# Patient Record
Sex: MALE | Race: BLACK OR AFRICAN AMERICAN | Employment: UNEMPLOYED | URBAN - METROPOLITAN AREA
[De-identification: names, ages, dates, MRNs, and addresses within clinical notes are randomized per-mention and may not be internally consistent; named-entity substitution may affect disease eponyms.]

---

## 2018-03-28 ENCOUNTER — HOSPITAL ENCOUNTER (INPATIENT)
Age: 33
LOS: 4 days | Discharge: HOME | DRG: 751 | End: 2018-04-02
Attending: EMERGENCY MEDICINE | Admitting: PSYCHIATRY & NEUROLOGY
Payer: COMMERCIAL

## 2018-03-28 DIAGNOSIS — R45.851 SUICIDAL IDEATIONS: Primary | ICD-10-CM

## 2018-03-28 DIAGNOSIS — G62.9 NEUROPATHY: ICD-10-CM

## 2018-03-28 LAB
ALBUMIN SERPL-MCNC: 4.6 G/DL (ref 3.4–5)
ALBUMIN/GLOB SERPL: 1.1 {RATIO} (ref 0.8–1.7)
ALP SERPL-CCNC: 95 U/L (ref 45–117)
ALT SERPL-CCNC: 140 U/L (ref 16–61)
AMPHET UR QL SCN: NEGATIVE
ANION GAP SERPL CALC-SCNC: 24 MMOL/L (ref 3–18)
APPEARANCE UR: CLEAR
AST SERPL-CCNC: 107 U/L (ref 15–37)
BACTERIA URNS QL MICRO: NEGATIVE /HPF
BARBITURATES UR QL SCN: NEGATIVE
BASOPHILS # BLD: 0 K/UL (ref 0–0.1)
BASOPHILS NFR BLD: 0 % (ref 0–2)
BENZODIAZ UR QL: NEGATIVE
BILIRUB SERPL-MCNC: 0.8 MG/DL (ref 0.2–1)
BILIRUB UR QL: NEGATIVE
BUN SERPL-MCNC: 12 MG/DL (ref 7–18)
BUN/CREAT SERPL: 13 (ref 12–20)
CALCIUM SERPL-MCNC: 9.8 MG/DL (ref 8.5–10.1)
CANNABINOIDS UR QL SCN: NEGATIVE
CHLORIDE SERPL-SCNC: 91 MMOL/L (ref 100–108)
CO2 SERPL-SCNC: 16 MMOL/L (ref 21–32)
COCAINE UR QL SCN: NEGATIVE
COLOR UR: YELLOW
CREAT SERPL-MCNC: 0.92 MG/DL (ref 0.6–1.3)
DIFFERENTIAL METHOD BLD: ABNORMAL
EOSINOPHIL # BLD: 0 K/UL (ref 0–0.4)
EOSINOPHIL NFR BLD: 0 % (ref 0–5)
EPITH CASTS URNS QL MICRO: NORMAL /LPF (ref 0–5)
ERYTHROCYTE [DISTWIDTH] IN BLOOD BY AUTOMATED COUNT: 13.7 % (ref 11.6–14.5)
ETHANOL SERPL-MCNC: 218 MG/DL (ref 0–3)
GLOBULIN SER CALC-MCNC: 4.1 G/DL (ref 2–4)
GLUCOSE SERPL-MCNC: 309 MG/DL (ref 74–99)
GLUCOSE UR STRIP.AUTO-MCNC: >1000 MG/DL
HCT VFR BLD AUTO: 44.2 % (ref 36–48)
HDSCOM,HDSCOM: NORMAL
HGB BLD-MCNC: 15.9 G/DL (ref 13–16)
HGB UR QL STRIP: NEGATIVE
KETONES UR QL STRIP.AUTO: 80 MG/DL
LEUKOCYTE ESTERASE UR QL STRIP.AUTO: NEGATIVE
LYMPHOCYTES # BLD: 1.5 K/UL (ref 0.9–3.6)
LYMPHOCYTES NFR BLD: 23 % (ref 21–52)
MCH RBC QN AUTO: 32.6 PG (ref 24–34)
MCHC RBC AUTO-ENTMCNC: 36 G/DL (ref 31–37)
MCV RBC AUTO: 90.8 FL (ref 74–97)
METHADONE UR QL: NEGATIVE
MONOCYTES # BLD: 1 K/UL (ref 0.05–1.2)
MONOCYTES NFR BLD: 15 % (ref 3–10)
NEUTS SEG # BLD: 4.2 K/UL (ref 1.8–8)
NEUTS SEG NFR BLD: 62 % (ref 40–73)
NITRITE UR QL STRIP.AUTO: NEGATIVE
OPIATES UR QL: NEGATIVE
PCP UR QL: NEGATIVE
PH UR STRIP: 5 [PH] (ref 5–8)
PLATELET # BLD AUTO: 304 K/UL (ref 135–420)
PMV BLD AUTO: 10.7 FL (ref 9.2–11.8)
POTASSIUM SERPL-SCNC: 3.5 MMOL/L (ref 3.5–5.5)
PROT SERPL-MCNC: 8.7 G/DL (ref 6.4–8.2)
PROT UR STRIP-MCNC: 30 MG/DL
RBC # BLD AUTO: 4.87 M/UL (ref 4.7–5.5)
RBC #/AREA URNS HPF: NEGATIVE /HPF (ref 0–5)
SODIUM SERPL-SCNC: 131 MMOL/L (ref 136–145)
SP GR UR REFRACTOMETRY: >1.03 (ref 1–1.03)
UROBILINOGEN UR QL STRIP.AUTO: 0.2 EU/DL (ref 0.2–1)
WBC # BLD AUTO: 6.7 K/UL (ref 4.6–13.2)
WBC URNS QL MICRO: NEGATIVE /HPF (ref 0–4)

## 2018-03-28 PROCEDURE — 80307 DRUG TEST PRSMV CHEM ANLYZR: CPT | Performed by: NURSE PRACTITIONER

## 2018-03-28 PROCEDURE — 85025 COMPLETE CBC W/AUTO DIFF WBC: CPT | Performed by: NURSE PRACTITIONER

## 2018-03-28 PROCEDURE — 80053 COMPREHEN METABOLIC PANEL: CPT | Performed by: NURSE PRACTITIONER

## 2018-03-28 PROCEDURE — 81001 URINALYSIS AUTO W/SCOPE: CPT | Performed by: NURSE PRACTITIONER

## 2018-03-28 PROCEDURE — 99285 EMERGENCY DEPT VISIT HI MDM: CPT

## 2018-03-28 NOTE — ED PROVIDER NOTES
HPI Comments: Pt presents to ed with suicidal thoughts and a plan that he states if he is not treated he will come back dead. No attempt thus far    Patient is a 29 y.o. male presenting with suicidal ideation. The history is provided by the patient. No  was used. Suicidal   This is a new problem. The current episode started 1 to 2 hours ago. The problem occurs constantly. The problem has not changed since onset. Pertinent negatives include no headaches. Nothing aggravates the symptoms. Nothing relieves the symptoms. He has tried nothing for the symptoms. Past Medical History:   Diagnosis Date    Asthma     vs Reactive Airway Disease with normal PFT 7/7/14    Diabetes ([facility] [address] 2012    HTN (hypertension)     Hyperlipidemia LDL goal < 70 12/2014    Leg pain, bilateral 2012    ? peripheral neuropathy    Vitamin D deficiency 12/2014       History reviewed. No pertinent surgical history.       Family History:   Problem Relation Age of Onset    Hypertension Mother     Diabetes Mother     Stroke Paternal Uncle     Diabetes Paternal Aunt     Diabetes Maternal Aunt     Diabetes Other      cousin, Type I dm       Social History     Social History    Marital status: SINGLE     Spouse name: N/A    Number of children: N/A    Years of education: N/A     Occupational History    ,       per [employer], [place]     Social History Main Topics    Smoking status: Former Smoker     Types: Cigarettes    Smokeless tobacco: Never Used    Alcohol use Yes      Comment: 3 x week, liquor tequila - free pour at home 2-3 drinks at home, one pint per week    Drug use: No    Sexual activity: Yes     Partners: Female     Birth control/ protection: Condom     Other Topics Concern     Service No    Blood Transfusions No    Caffeine Concern No    Occupational Exposure No    Hobby Hazards No    Sleep Concern No    Stress Concern Yes     fatigue    Special Diet No    Back Care No    Exercise No    Bike Helmet No    Seat Belt No    Self-Exams No     Social History Narrative         ALLERGIES: Review of patient's allergies indicates no known allergies. Review of Systems   Constitutional: Negative for fever. Neurological: Negative for headaches. Psychiatric/Behavioral: Positive for suicidal ideas. Negative for self-injury. All other systems reviewed and are negative. Vitals:    03/28/18 1853   BP: (!) 187/94   Pulse: (!) 120   Resp: 18   Temp: 100 °F (37.8 °C)   SpO2: 95%   Weight: 131.5 kg (290 lb)   Height: 6' 2\" (1.88 m)            Physical Exam   Constitutional: He is oriented to person, place, and time. He appears well-developed and well-nourished. HENT:   Head: Normocephalic and atraumatic. Eyes: Conjunctivae and EOM are normal. Pupils are equal, round, and reactive to light. Neck: Normal range of motion. Neck supple. Cardiovascular: Normal rate and regular rhythm. Pulmonary/Chest: Effort normal and breath sounds normal.   Abdominal: Soft. Bowel sounds are normal.   Musculoskeletal: Normal range of motion. Neurological: He is alert and oriented to person, place, and time. He has normal reflexes. Skin: Skin is warm and dry. Psychiatric: Judgment normal. He is withdrawn. He exhibits a depressed mood. He expresses suicidal ideation. Nursing note and vitals reviewed. MDM  Number of Diagnoses or Management Options  Suicidal ideations: established and worsening  Diagnosis management comments: Pt cleared for  evaluation. Liza Contreras and Selwyn White informed.       Pt cleared for  evalaution    Per Selwyn White with  pt will be admitted to [facility] unit, he asked for haldol and ativan to be given to pt before admission       Amount and/or Complexity of Data Reviewed  Clinical lab tests: ordered  Review and summarize past medical records: yes  Independent visualization of images, tracings, or specimens: yes    Risk of Complications, Morbidity, and/or Mortality  Presenting problems: moderate  Diagnostic procedures: moderate  Management options: moderate    Patient Progress  Patient progress: stable        ED Course       Procedures            Vitals:  Patient Vitals for the past 12 hrs:   Temp Pulse Resp BP SpO2   03/28/18 1853 100 °F (37.8 °C) (!) 120 18 (!) 187/94 95 %        Lab findings:  Recent Results (from the past 12 hour(s))   URINALYSIS W/ RFLX MICROSCOPIC    Collection Time: 03/28/18  7:30 PM   Result Value Ref Range    Color YELLOW      Appearance CLEAR      Specific gravity >1.030 (H) 1.005 - 1.030    pH (UA) 5.0 5.0 - 8.0      Protein 30 (A) NEG mg/dL    Glucose >1000 (A) NEG mg/dL    Ketone 80 (A) NEG mg/dL    Bilirubin NEGATIVE  NEG      Blood NEGATIVE  NEG      Urobilinogen 0.2 0.2 - 1.0 EU/dL    Nitrites NEGATIVE  NEG      Leukocyte Esterase NEGATIVE  NEG     DRUG SCREEN, URINE    Collection Time: 03/28/18  7:30 PM   Result Value Ref Range    BENZODIAZEPINES NEGATIVE  NEG      BARBITURATES NEGATIVE  NEG      THC (TH-CANNABINOL) NEGATIVE  NEG      OPIATES NEGATIVE  NEG      PCP(PHENCYCLIDINE) NEGATIVE  NEG      COCAINE NEGATIVE  NEG      AMPHETAMINES NEGATIVE  NEG      METHADONE NEGATIVE  NEG      HDSCOM (NOTE)    URINE MICROSCOPIC ONLY    Collection Time: 03/28/18  7:30 PM   Result Value Ref Range    WBC NEGATIVE  0 - 4 /hpf    RBC NEGATIVE  0 - 5 /hpf    Epithelial cells 1+ 0 - 5 /lpf    Bacteria NEGATIVE  NEG /hpf   CBC WITH AUTOMATED DIFF    Collection Time: 03/28/18  9:38 PM   Result Value Ref Range    WBC 6.7 4.6 - 13.2 K/uL    RBC 4.87 4.70 - 5.50 M/uL    HGB 15.9 13.0 - 16.0 g/dL    HCT 44.2 36.0 - 48.0 %    MCV 90.8 74.0 - 97.0 FL    MCH 32.6 24.0 - 34.0 PG    MCHC 36.0 31.0 - 37.0 g/dL    RDW 13.7 11.6 - 14.5 %    PLATELET 384 317 - 010 K/uL    MPV 10.7 9.2 - 11.8 FL    NEUTROPHILS 62 40 - 73 %    LYMPHOCYTES 23 21 - 52 %    MONOCYTES 15 (H) 3 - 10 %    EOSINOPHILS 0 0 - 5 %    BASOPHILS 0 0 - 2 %    ABS. NEUTROPHILS 4.2 1.8 - 8.0 K/UL    ABS.  LYMPHOCYTES 1.5 0.9 - 3.6 K/UL    ABS. MONOCYTES 1.0 0.05 - 1.2 K/UL    ABS. EOSINOPHILS 0.0 0.0 - 0.4 K/UL    ABS. BASOPHILS 0.0 0.0 - 0.1 K/UL    DF AUTOMATED     METABOLIC PANEL, COMPREHENSIVE    Collection Time: 03/28/18  9:38 PM   Result Value Ref Range    Sodium 131 (L) 136 - 145 mmol/L    Potassium 3.5 3.5 - 5.5 mmol/L    Chloride 91 (L) 100 - 108 mmol/L    CO2 16 (L) 21 - 32 mmol/L    Anion gap 24 (H) 3.0 - 18 mmol/L    Glucose 309 (H) 74 - 99 mg/dL    BUN 12 7.0 - 18 MG/DL    Creatinine 0.92 0.6 - 1.3 MG/DL    BUN/Creatinine ratio 13 12 - 20      GFR est AA >60 >60 ml/min/1.73m2    GFR est non-AA >60 >60 ml/min/1.73m2    Calcium 9.8 8.5 - 10.1 MG/DL    Bilirubin, total 0.8 0.2 - 1.0 MG/DL    ALT (SGPT) 140 (H) 16 - 61 U/L    AST (SGOT) 107 (H) 15 - 37 U/L    Alk. phosphatase 95 45 - 117 U/L    Protein, total 8.7 (H) 6.4 - 8.2 g/dL    Albumin 4.6 3.4 - 5.0 g/dL    Globulin 4.1 (H) 2.0 - 4.0 g/dL    A-G Ratio 1.1 0.8 - 1.7     ETHYL ALCOHOL    Collection Time: 03/28/18  9:38 PM   Result Value Ref Range    ALCOHOL(ETHYL),SERUM 218 (H) 0 - 3 MG/DL   GLUCOSE, POC    Collection Time: 03/29/18  1:11 AM   Result Value Ref Range    Glucose (POC) 327 (H) 70 - 110 mg/dL         Disposition:    Diagnosis:   1. Suicidal ideations        Disposition: admitted to inpatient  unit        Patient's Medications   Start Taking    No medications on file   Continue Taking    ALBUTEROL (PROVENTIL HFA) 90 MCG/ACTUATION INHALER    Take 2 Puffs by inhalation every four (4) hours as needed for Wheezing. CHOLECALCIFEROL, VITAMIN D3, (VITAMIN D3) 2,000 UNIT CAP CAPSULE    Take 2,000 Units by mouth daily. FLUTICASONE-SALMETEROL (ADVAIR) 100-50 MCG/DOSE DISKUS INHALER    Take 1 Puff by inhalation every twelve (12) hours. INSULIN ASPART (NOVOLOG) 100 UNIT/ML FLEXPEN    Sliding scale insulin    INSULIN DETEMIR (LEVEMIR FLEXTOUCH) 100 UNIT/ML (3 ML) PEN    20 Units by SubCUTAneous route two (2) times a day.  Indications: TYPE 1 DIABETES MELLITUS    MAGNESIUM OXIDE (MAG-OX) 400 MG TABLET    Take 1 tablet by mouth daily. OMEGA-3 ACID ETHYL ESTERS (LOVAZA) 1 GRAM CAPSULE    Take 2 capsules by mouth two (2) times a day. PREGABALIN (LYRICA) 150 MG CAPSULE    Take 1 capsule by mouth two (2) times a day. ROSUVASTATIN (CRESTOR) 10 MG TABLET    Take 1 tablet by mouth nightly.    These Medications have changed    No medications on file   Stop Taking    No medications on file        Karri SZYMANSKIP-C

## 2018-03-28 NOTE — ED TRIAGE NOTES
\"I'm going kill myself. \"  The patient admits to having suicidal thoughts for \"quite some time. \"  States, \"I'm tired of thinking about it. I'm ready to do something about it. \"

## 2018-03-28 NOTE — IP AVS SNAPSHOT
Summary of Care Report The Summary of Care report has been created to help improve care coordination. Users with access to "[employer] or [employer] [facility] [facility] (Web-based application) may access additional patient information including the Discharge Summary. If you are not currently a [facility] user and need more information, please call the number listed below in the [place] [address] section and ask to be connected with Medical Records. Facility Information Name Address Phone [address] [facility] [address] [facility] [address] [phone] Patient Information Patient Name Sex  David Hook ([id number]) Male 1985 Discharge Information Admitting Provider Service Area Unit Mark Renteria MD / [address] [place] [address] 1 Adult Chem [facility] / [phone] Discharge Provider Discharge Date/Time Discharge Disposition Destination (none) 2018 Afternoon (Pending) AHR (none) Patient Language Language ENGLISH [13] Hospital Problems as of 2018  Reviewed: 2015  9:34 AM by Martin Garsia NP Class Noted - Resolved Last Modified POA Active Problems * (Principal)MDD (major depressive disorder), recurrent severe, without psychosis ([address]  3/29/2018 - Present 3/29/2018 by Mark Renteria MD Yes Entered by Mark Renteria MD  
  Alcohol use disorder, moderate, dependence ([address]  3/29/2018 - Present 3/29/2018 by Mark Renteria MD Yes Entered by Mark Renteria MD  
  Alcohol withdrawal, uncomplicated ([address]   - Present 3/29/2018 by Mark Renteria MD Unknown Entered by Mark Renteria MD  
  
Non-Hospital Problems as of 2018  Reviewed: 2015  9:34 AM by Martin Garsia NP Class Noted - Resolved Last Modified Active Problems Asthma  4/10/2013 - Present 4/10/2013 by Tavia Lao Entered by Tavia Lao HTN (hypertension)  6/9/2014 - Present 6/9/2014 by Marysol Corey, NP Entered by Marysol Corey, PA Flexor tendon laceration of finger with open wound  1/6/2015 - Present 1/6/2015 by Marysol Corey, NP Entered by Marysol Corey, PA Hypertriglyceridemia  1/6/2015 - Present 1/6/2015 by Marysol Corey, NP Entered by Marysol Corey, NP Diabetes mellitus, insulin dependent (IDDM), controlled ([facility] [address]  1/6/2015 - Present 1/6/2015 by Marysol Corey, NP Entered by Marysol Corey, PA Hyperlipidemia LDL goal < 70  1/6/2015 - Present 1/6/2015 by Marysol Corey, NP Entered by Marysol Corey, PA Hypomagnesemia  2/7/2015 - Present 2/7/2015 by Marysol Corey, NP Entered by Marysol Corey, PA Vitamin D deficiency  2/7/2015 - Present 2/7/2015 by Marysol Corey, NP Entered by Mraysol Corey, PA You are allergic to the following No active allergies Current Discharge Medication List  
  
START taking these medications Dose & Instructions Dispensing Information Comments  
 amino acids/multivit with iron Cap capsule Commonly known as:  Becca Westmoreland Dose:  2 Cap Take 2 Caps by mouth three (3) times daily (with meals) for 17 days. Indications: Vitamin deficiency Quantity:  51 Cap Refills:  0  
   
 ARIPiprazole 5 mg tablet Commonly known as:  ABILIFY Start taking on:  4/3/2018 Dose:  7.5 mg Take 1.5 Tabs by mouth daily. Indications: DEPRESSION TREATMENT ADJUNCT Quantity:  45 Tab Refills:  0  
   
 citalopram 20 mg tablet Commonly known as:  Eduardo Haw Dose:  20 mg Take 1 Tab by mouth every evening. Indications: major depressive disorder Quantity:  30 Tab Refills:  0  
   
 insulin glargine 100 unit/mL injection Commonly known as:  LANTUS Dose:  40 Units 40 Units by SubCUTAneous route two (2) times a day. Indications: DM  Quantity:  1 Vial  
 Refills:  0  
   
 metFORMIN  mg tablet Commonly known as:  GLUCOPHAGE XR Dose:  750 mg Take 1 Tab by mouth Before breakfast and dinner. Indications: type 2 diabetes mellitus Quantity:  60 Tab Refills:  0  
   
 methocarbamol 750 mg tablet Commonly known as:  ROBAXIN Dose:  750 mg Take 1 Tab by mouth four (4) times daily as needed. Indications: Muscle Spasm Quantity:  30 Tab Refills:  0  
   
 neomycin-bacitracin-polymyxin 3.5mg-400 unit- 5,000 unit/gram ointment Commonly known as:  NEOSPORIN Apply  to affected area two (2) times a day. Indications: MINOR BACTERIAL SKIN INFECTIONS Quantity:  1 Tube Refills:  0  
   
 traZODone 100 mg tablet Commonly known as:  Eward Sheets Dose:  100 mg Take 1 Tab by mouth nightly as needed for Sleep. Indications: insomnia associated with depression Quantity:  15 Tab Refills:  0 CONTINUE these medications which have NOT CHANGED Dose & Instructions Dispensing Information Comments  
 albuterol 90 mcg/actuation inhaler Commonly known as:  PROVENTIL HFA Dose:  2 Puff Take 2 Puffs by inhalation every four (4) hours as needed for Wheezing. Quantity:  3 Inhaler Refills:  3 Cholecalciferol (Vitamin D3) 2,000 unit Cap capsule Commonly known as:  VITAMIN D3 Dose:  2000 Units Take 2,000 Units by mouth daily. Quantity:  30 capsule Refills:  11  
   
 fluticasone-salmeterol 100-50 mcg/dose diskus inhaler Commonly known as:  ADVAIR Dose:  1 Puff Take 1 Puff by inhalation every twelve (12) hours. Quantity:  4 Inhaler Refills:  3  
   
 magnesium oxide 400 mg tablet Commonly known as:  MAG-OX Dose:  400 mg Take 1 tablet by mouth daily. Quantity:  30 tablet Refills:  11  
   
 omega-3 acid ethyl esters 1 gram capsule Commonly known as:  Epimenio Devon Dose:  2 g Take 2 capsules by mouth two (2) times a day. Quantity:  360 capsule Refills:  3  
   
 pregabalin 150 mg capsule Commonly known as:  Carie Jimmy Dose:  150 mg Take 1 Cap by mouth two (2) times a day. Quantity:  180 Cap Refills:  3  
   
 rosuvastatin 10 mg tablet Commonly known as:  CRESTOR Dose:  10 mg Take 1 tablet by mouth nightly. Quantity:  90 tablet Refills:  3 STOP taking these medications Comments  
 insulin aspart U-100 100 unit/mL Inpn Commonly known as:  NOVOLOG  
   
   
 insulin detemir U-100 100 unit/mL (3 mL) Inpn Commonly known as:  Vinod Moreno Follow-up Information Follow up With Details Comments Contact [facility]  Intake associates ate both out today. ...will call patient tomorrow with [address] [facility] [address] [place] [address] [phone] Discharge Instructions BEHAVIORAL HEALTH NURSING DISCHARGE NOTE Emergency Numbers [address] [facility] Desk: [phone] [facility]: [phone] Suicide Prevention Line: [phone] (TALK) The following personal items collected during your admission are returned to you:  
Dental Appliance:   
Vision: Visual Aid: None Hearing Aid:   
Jewelry:   
Clothing: Clothing: (P) Belt, Pants, Shirt, Socks, Undergarments Other Valuables: Other Valuables: (P) Cigarettes, Wallet (shoes and two lighters) Valuables sent to safe: The discharge information has been reviewed with the patient. The patient verbalized understanding. [employer] Activation Thank you for requesting access to [employer]. Please follow the instructions below to securely access and download your online medical record. [employer] allows you to send messages to your doctor, view your test results, renew your prescriptions, schedule appointments, and more. How Do I Sign Up? In your internet browser, go to [email or web address] Click on the First Time User? Click Here link in the Sign In box. You will be redirect to the New Member Sign Up page. Enter your [employer] Access Code exactly as it appears below. You will not need to use this code after youve completed the sign-up process. If you do not sign up before the expiration date, you must request a new code. WhatsApp Access Code: [email or web address] Expires: 2018  9:39 PM (This is the date your WhatsApp access code will ) Enter the last four digits of your Social Security Number (xxxx) and Date of Birth (mm/dd/yyyy) as indicated and click Submit. You will be taken to the next sign-up page. Create a [employer] ID. This will be your WhatsApp login ID and cannot be changed, so think of one that is secure and easy to remember. Create a WhatsApp password. You can change your password at any time. Enter your Password Reset Question and Answer. This can be used at a later time if you forget your password. Enter your e-mail address. You will receive e-mail notification when new information is available in [address] Click Sign Up. You can now view and download portions of your medical record. Click the [place] link to download a portable copy of your medical information. Additional Information If you have questions, please visit the Frequently Asked Questions section of the [employer] website at [email or web address] Remember, WhatsApp is NOT to be used for urgent needs. For medical emergencies, dial 911. Patient armband removed and shredded Chart Review Routing History No Routing History on File

## 2018-03-28 NOTE — IP AVS SNAPSHOT
[facility] 
 
 
 [address] [facility] Barrie Mota Patient: Ruba Ortega MRN: [id number] :1985 About your hospitalization You were admitted on:  2018 You last received care in the:  [facility] ADULT CHEM DEP You were discharged on:  2018 Why you were hospitalized Your primary diagnosis was:  Mdd (Major Depressive Disorder), Recurrent Severe, Without Psychosis (Hcc) Your diagnoses also included:  Alcohol Use Disorder, Moderate, Dependence (Hcc), Alcohol Withdrawal, Uncomplicated (Hcc) Follow-up Information Follow up With Details Comments Contact [facility]  Intake associates ate both out today. ...will call patient tomorrow with [employer] [address] [place] [address] [phone] Discharge Orders None A check xavier indicates which time of day the medication should be taken. My Medications START taking these medications Instructions Each Dose to Equal  
 Morning Noon Evening Bedtime  
 amino acids/multivit with iron Cap capsule Commonly known as:  Kalin Martin Your last dose was: Your next dose is: Take 2 Caps by mouth three (3) times daily (with meals) for 17 days. Indications: Vitamin deficiency 2 Cap ARIPiprazole 5 mg tablet Commonly known as:  ABILIFY Start taking on:  4/3/2018 Your last dose was: Your next dose is: Take 1.5 Tabs by mouth daily. Indications: DEPRESSION TREATMENT ADJUNCT  
 7.5 mg  
    
   
   
   
  
 citalopram 20 mg tablet Commonly known as:  Jamal Jones Your last dose was: Your next dose is: Take 1 Tab by mouth every evening. Indications: major depressive disorder 20 mg  
    
   
   
   
  
 insulin glargine 100 unit/mL injection Commonly known as:  LANTUS Your last dose was: Your next dose is:    
   
   
 40 Units by SubCUTAneous route two (2) times a day. Indications: DM  
 40 Units  
    
   
   
   
  
 metFORMIN  mg tablet Commonly known as:  GLUCOPHAGE XR Your last dose was: Your next dose is: Take 1 Tab by mouth Before breakfast and dinner. Indications: type 2 diabetes mellitus 750 mg  
    
   
   
   
  
 methocarbamol 750 mg tablet Commonly known as:  ROBAXIN Your last dose was: Your next dose is: Take 1 Tab by mouth four (4) times daily as needed. Indications: Muscle Spasm  
 750 mg  
    
   
   
   
  
 neomycin-bacitracin-polymyxin 3.5mg-400 unit- 5,000 unit/gram ointment Commonly known as:  NEOSPORIN Your last dose was: Your next dose is:    
   
   
 Apply  to affected area two (2) times a day. Indications: MINOR BACTERIAL SKIN INFECTIONS  
     
   
   
   
  
 traZODone 100 mg tablet Commonly known as:  Darvin Freeman Your last dose was: Your next dose is: Take 1 Tab by mouth nightly as needed for Sleep. Indications: insomnia associated with depression 100 mg CONTINUE taking these medications Instructions Each Dose to Equal  
 Morning Noon Evening Bedtime  
 albuterol 90 mcg/actuation inhaler Commonly known as:  PROVENTIL HFA Your last dose was: Your next dose is: Take 2 Puffs by inhalation every four (4) hours as needed for Wheezing. 2 Puff Cholecalciferol (Vitamin D3) 2,000 unit Cap capsule Commonly known as:  VITAMIN D3 Your last dose was: Your next dose is: Take 2,000 Units by mouth daily. 2000 Units  
    
   
   
   
  
 fluticasone-salmeterol 100-50 mcg/dose diskus inhaler Commonly known as:  ADVAIR Your last dose was: Your next dose is: Take 1 Puff by inhalation every twelve (12) hours. 1 Puff magnesium oxide 400 mg tablet Commonly known as:  MAG-OX Your last dose was: Your next dose is: Take 1 tablet by mouth daily. 400 mg  
    
   
   
   
  
 omega-3 acid ethyl esters 1 gram capsule Commonly known as:  Poncho Sanchez Your last dose was: Your next dose is: Take 2 capsules by mouth two (2) times a day. 2 g  
    
   
   
   
  
 pregabalin 150 mg capsule Commonly known as:  Mariaelena Quigley Your last dose was: Your next dose is: Take 1 Cap by mouth two (2) times a day. 150 mg  
    
   
   
   
  
 rosuvastatin 10 mg tablet Commonly known as:  CRESTOR Your last dose was: Your next dose is: Take 1 tablet by mouth nightly. 10 mg  
    
   
   
   
  
  
STOP taking these medications   
 insulin aspart U-100 100 unit/mL Inpn Commonly known as:  NOVOLOG  
   
  
 insulin detemir U-100 100 unit/mL (3 mL) Inpn Commonly known as:  Ashleigh Daugherty Where to Get Your Medications Information on where to get these meds will be given to you by the nurse or doctor. ! Ask your nurse or doctor about these medications  
  amino acids/multivit with iron Cap capsule ARIPiprazole 5 mg tablet  
 citalopram 20 mg tablet  
 insulin glargine 100 unit/mL injection  
 metFORMIN  mg tablet  
 methocarbamol 750 mg tablet  
 neomycin-bacitracin-polymyxin 3.5mg-400 unit- 5,000 unit/gram ointment  
 traZODone 100 mg tablet Discharge Instructions BEHAVIORAL HEALTH NURSING DISCHARGE NOTE Emergency Numbers [facility] Desk: [phone] [facility]: [phone] Suicide Prevention Line: [phone] (TALK) The following personal items collected during your admission are returned to you:  
Dental Appliance:   
Vision: Visual Aid: None Hearing Aid:   
Jewelry:   
Clothing: Clothing: (P) Belt, Pants, Shirt, Socks, Undergarments Other Valuables: Other Valuables: (P) Cigarettes, Wallet (shoes and two lighters) Valuables sent to safe: The discharge information has been reviewed with the patient. The patient verbalized understanding. MyChart Activation Thank you for requesting access to [email or web address]. Please follow the instructions below to securely access and download your online medical record. [email or web address] allows you to send messages to your doctor, view your test results, renew your prescriptions, schedule appointments, and more. How Do I Sign Up? In your internet browser, go to [email or web address] Click on the First Time User? Click Here link in the Sign In box. You will be redirect to the New Member Sign Up page. Enter your [email or web address] Access Code exactly as it appears below. You will not need to use this code after youve completed the sign-up process. If you do not sign up before the expiration date, you must request a new code. [email or web address] Access Code: [email or web address] Expires: 2018  9:39 PM (This is the date your [email or web address] access code will ) Enter the last four digits of your Social Security Number (xxxx) and Date of Birth (mm/dd/yyyy) as indicated and click Submit. You will be taken to the next sign-up page. Create a [email or web address] ID. This will be your [email or web address] login ID and cannot be changed, so think of one that is secure and easy to remember. Create a [email or web address] password. You can change your password at any time. Enter your Password Reset Question and Answer. This can be used at a later time if you forget your password. Enter your e-mail address. You will receive e-mail notification when new information is available in [address] Click Sign Up. You can now view and download portions of your medical record. Click the [employer] link to download a portable copy of your medical information. Additional Information If you have questions, please visit the Frequently Asked Questions section of the [facility] website at [email or web address] and some sugar. Need Fixed/amaysimt/. Remember, MyChart is NOT to be used for urgent needs. For medical emergencies, dial 911. Patient armband removed and shredded Introducing \[facility] [place] [facility]\"" & HEALTH SERVICES! [facility] introduces [facility] patient portal. Now you can access parts of your medical record, email your doctor's office, and request medication refills online. 1. In your internet browser, go to [email or web address] and some sugar. Need Fixed/amaysimt 2. Click on the First Time User? Click Here link in the Sign In box. You will see the New Member Sign Up page. 3. Enter your [facility] Access Code exactly as it appears below. You will not need to use this code after youve completed the sign-up process. If you do not sign up before the expiration date, you must request a new code. · [facility] Access Code: [email or web address] Expires: 6/26/2018  9:39 PM 
 
4. Enter the last four digits of your Social Security Number (xxxx) and Date of Birth (mm/dd/yyyy) as indicated and click Submit. You will be taken to the next sign-up page. 5. Create a [facility] ID. This will be your [facility] login ID and cannot be changed, so think of one that is secure and easy to remember. 6. Create a [facility] password. You can change your password at any time. 7. Enter your Password Reset Question and Answer. This can be used at a later time if you forget your password. 8. Enter your e-mail address. You will receive e-mail notification when new information is available in [address] 9. Click Sign Up. You can now view and download portions of your medical record. 10. Click the Download Summary menu link to download a portable copy of your medical information. If you have questions, please visit the Frequently Asked Questions section of the [facility] website. Remember, [facility] is NOT to be used for urgent needs. For medical emergencies, dial 911. Now available from your iPhone and Android! Introducing Ambrocio Senior As a [employer] patient, I wanted to make you aware of our electronic visit tool called [employer] Senior. [employer] 24/7 allows you to connect within minutes with a medical provider 24 hours a day, seven days a week via a mobile device or tablet or logging into a secure website from your computer. You can access Ambrocio Senior from anywhere in the [place]. A virtual visit might be right for you when you have a simple condition and feel like you just dont want to get out of bed, or cant get away from work for an appointment, when your regular [employer] provider is not available (evenings, weekends or holidays), or when youre out of town and need minor care. Electronic visits cost only $49 and if the [employer] 24/7 provider determines a prescription is needed to treat your condition, one can be electronically transmitted to a nearby pharmacy*. Please take a moment to enroll today if you have not already done so. The enrollment process is free and takes just a few minutes. To enroll, please download the [employer] 24/7 peng to your tablet or phone, or visit [email or web address] to enroll on your computer. And, as an [address] [place] [address] patient with a [employer] account, the results of your visits will be scanned into your electronic medical record and your primary care provider will be able to view the scanned results. We urge you to continue to see your regular [employer] provider for your ongoing medical care. And while your primary care provider may not be the one available when you seek a Ambrocio Senior virtual visit, the peace of mind you get from getting a real diagnosis real time can be priceless. For more information on Ambrocio Senior, view our Frequently Asked Questions (FAQs) at [email or web address]. Sincerely, 
 
Daniel Jackson MD 
Chief Medical Officer Rodney Ngo *:  certain medications cannot be prescribed via Ambrocio Senior Providers Seen During Your Hospitalization Provider Specialty Primary office phone Oma Gibson MD Emergency Medicine [phone] Ailyn Ricci DO Emergency Medicine [phone] Carolyn Preciado MD Psychiatry [phone] Your Primary Care Physician (PCP) Primary Care Physician Office Phone Office Fax OTHER, PHYS ** None ** ** None ** You are allergic to the following No active allergies Recent Documentation Height Weight BMI Smoking Status 1.88 m 131.5 kg 37.23 kg/m2 Former Smoker Emergency Contacts Name Discharge Info Relation Home Work [facility] [address] [facility] C[facility] [3] Parent [1] [phone] Patient Belongings The following personal items are in your possession at time of discharge: 
     Visual Aid: None             Clothing: (P) Belt, Pants, Shirt, Socks, Undergarments    Other Valuables: (P) Cigarettes, Wallet (shoes and two lighters) Discharge Instructions Attachments/References DIABETES DIET MEAL PLANNING: GENERAL INFO (ENGLISH) DIABETES DIET GUIDELINES: GENERAL INFO (ENGLISH) DIABETES: COUNTING CARBOHYDRATES WHEN YOU TAKE INSULIN (ENGLISH) DIABETES: NUTRITION TIPS (ENGLISH) Patient Handouts Learning About Meal Planning for Diabetes Why plan your meals? Meal planning can be a key part of managing diabetes. Planning meals and snacks with the right balance of carbohydrate, protein, and fat can help you keep your blood sugar at the target level you set with your doctor. You don't have to eat special foods. You can eat what your family eats, including sweets once in a while. But you do have to pay attention to how often you eat and how much you eat of certain foods. You may want to work with a dietitian or a certified diabetes educator.  He or she can give you tips and meal ideas and can answer your questions about meal planning. This health professional can also help you reach a healthy weight if that is one of your goals. What plan is right for you? Your dietitian or diabetes educator may suggest that you start with the plate format or carbohydrate counting. The plate format The plate format is a simple way to help you manage how you eat. You plan meals by learning how much space each food should take on a plate. Using the plate format helps you spread carbohydrate throughout the day. It can make it easier to keep your blood sugar level within your target range. It also helps you see if you're eating healthy portion sizes. To use the plate format, you put non-starchy vegetables on half your plate. Add meat or meat substitutes on one-quarter of the plate. Put a grain or starchy vegetable (such as brown rice or a potato) on the final quarter of the plate. You can add a small piece of fruit and some low-fat or fat-free milk or yogurt, depending on your carbohydrate goal for each meal. 
Here are some tips for using the plate format: · Make sure that you are not using an oversized plate. A 9-inch plate is best. Many restaurants use larger plates. · Get used to using the plate format at home. Then you can use it when you eat out. · Write down your questions about using the plate format. Talk to your doctor, a dietitian, or a diabetes educator about your concerns. Carbohydrate counting With carbohydrate counting, you plan meals based on the amount of carbohydrate in each food. Carbohydrate raises blood sugar higher and more quickly than any other nutrient. It is found in desserts, breads and cereals, and fruit. It's also found in starchy vegetables such as potatoes and corn, grains such as rice and pasta, and milk and yogurt. Spreading carbohydrate throughout the day helps keep your blood sugar levels within your target range. Your daily amount depends on several things, including your weight, how active you are, which diabetes medicines you take, and what your goals are for your blood sugar levels. A registered dietitian or diabetes educator can help you plan how much carbohydrate to include in each meal and snack. A guideline for your daily amount of carbohydrate is: · 45 to 60 grams at each meal. That's about the same as 3 to 4 carbohydrate servings. · 15 to 20 grams at each snack. That's about the same as 1 carbohydrate serving. The Nutrition Facts label on packaged foods tells you how much carbohydrate is in a serving of the food. First, look at the serving size on the food label. Is that the amount you eat in a serving? All of the nutrition information on a food label is based on that serving size. So if you eat more or less than that, you'll need to adjust the other numbers. Total carbohydrate is the next thing you need to look for on the label. If you count carbohydrate servings, one serving of carbohydrate is 15 grams. For foods that don't come with labels, such as fresh fruits and vegetables, you'll need a guide that lists carbohydrate in these foods. Ask your doctor, dietitian, or diabetes educator about books or other nutrition guides you can use. If you take insulin, you need to know how many grams of carbohydrate are in a meal. This lets you know how much rapid-acting insulin to take before you eat. If you use an insulin pump, you get a constant rate of insulin during the day. So the pump must be programmed at meals to give you extra insulin to cover the rise in blood sugar after meals. When you know how much carbohydrate you will eat, you can take the right amount of insulin. Or, if you always use the same amount of insulin, you need to make sure that you eat the same amount of carbohydrate at meals.  
If you need more help to understand carbohydrate counting and food labels, ask your doctor, dietitian, or diabetes educator. How do you get started with meal planning? Here are some tips to get started: 
· Plan your meals a week at a time. Don't forget to include snacks too. · Use cookbooks or online recipes to plan several main meals. Plan some quick meals for busy nights. You also can double some recipes that freeze well. Then you can save half for other busy nights when you don't have time to cook. · Make sure you have the ingredients you need for your recipes. If you're running low on basic items, put these items on your shopping list too. · List foods that you use to make breakfasts, lunches, and snacks. List plenty of fruits and vegetables. · Post this list on the refrigerator. Add to it as you think of more things you need. · Take the list to the store to do your weekly shopping. Follow-up care is a key part of your treatment and safety. Be sure to make and go to all appointments, and call your doctor if you are having problems. It's also a good idea to know your test results and keep a list of the medicines you take. Where can you learn more? Go to http://anitraTextual Analytics Solutions.info/. Hannah Brar in the search box to learn more about \"Learning About Meal Planning for Diabetes. \" Current as of: March 13, 2017 Content Version: 11.4 © 1218-8132 [employer]. Care instructions adapted under license by [employer] (which disclaims liability or warranty for this information). If you have questions about a medical condition or this instruction, always ask your healthcare professional. [address] any warranty or liability for your use of this information. Learning About Diabetes Food Guidelines Your Care Instructions Meal planning is important to manage diabetes. It helps keep your blood sugar at a target level (which you set with your doctor).  You don't have to eat special foods. You can eat what your family eats, including sweets once in a while. But you do have to pay attention to how often you eat and how much you eat of certain foods. You may want to work with a dietitian or a certified diabetes educator (CDE) to help you plan meals and snacks. A dietitian or CDE can also help you lose weight if that is one of your goals. What should you know about eating carbs? Managing the amount of carbohydrate (carbs) you eat is an important part of healthy meals when you have diabetes. Carbohydrate is found in many foods. · Learn which foods have carbs. And learn the amounts of carbs in different foods. ¨ Bread, cereal, pasta, and rice have about 15 grams of carbs in a serving. A serving is 1 slice of bread (1 ounce), ½ cup of cooked cereal, or 1/3 cup of cooked pasta or rice. ¨ Fruits have 15 grams of carbs in a serving. A serving is 1 small fresh fruit, such as an apple or orange; ½ of a banana; ½ cup of cooked or canned fruit; ½ cup of fruit juice; 1 cup of melon or raspberries; or 2 tablespoons of dried fruit. ¨ Milk and no-sugar-added yogurt have 15 grams of carbs in a serving. A serving is 1 cup of milk or 2/3 cup of no-sugar-added yogurt. ¨ Starchy vegetables have 15 grams of carbs in a serving. A serving is ½ cup of mashed potatoes or sweet potato; 1 cup winter squash; ½ of a small baked potato; ½ cup of cooked beans; or ½ cup cooked corn or green peas. · Learn how much carbs to eat each day and at each meal. A dietitian or CDE can teach you how to keep track of the amount of carbs you eat. This is called carbohydrate counting. · If you are not sure how to count carbohydrate grams, use the Plate Method to plan meals. It is a good, quick way to make sure that you have a balanced meal. It also helps you spread carbs throughout the day. ¨ Divide your plate by types of foods.  Put non-starchy vegetables on half the plate, meat or other protein food on one-quarter of the plate, and a grain or starchy vegetable in the final quarter of the plate. To this you can add a small piece of fruit and 1 cup of milk or yogurt, depending on how many carbs you are supposed to eat at a meal. 
· Try to eat about the same amount of carbs at each meal. Do not \"save up\" your daily allowance of carbs to eat at one meal. 
· Proteins have very little or no carbs per serving. Examples of proteins are beef, chicken, turkey, fish, eggs, tofu, cheese, cottage cheese, and peanut butter. A serving size of meat is 3 ounces, which is about the size of a deck of cards. Examples of meat substitute serving sizes (equal to 1 ounce of meat) are 1/4 cup of cottage cheese, 1 egg, 1 tablespoon of peanut butter, and ½ cup of tofu. How can you eat out and still eat healthy? · Learn to estimate the serving sizes of foods that have carbohydrate. If you measure food at home, it will be easier to estimate the amount in a serving of restaurant food. · If the meal you order has too much carbohydrate (such as potatoes, corn, or baked beans), ask to have a low-carbohydrate food instead. Ask for a salad or green vegetables. · If you use insulin, check your blood sugar before and after eating out to help you plan how much to eat in the future. · If you eat more carbohydrate at a meal than you had planned, take a walk or do other exercise. This will help lower your blood sugar. What else should you know? · Limit saturated fat, such as the fat from meat and dairy products. This is a healthy choice because people who have diabetes are at higher risk of heart disease. So choose lean cuts of meat and nonfat or low-fat dairy products. Use olive or canola oil instead of butter or shortening when cooking. · Don't skip meals. Your blood sugar may drop too low if you skip meals and take insulin or certain medicines for diabetes. · Check with your doctor before you drink alcohol. Alcohol can cause your blood sugar to drop too low. Alcohol can also cause a bad reaction if you take certain diabetes medicines. Follow-up care is a key part of your treatment and safety. Be sure to make and go to all appointments, and call your doctor if you are having problems. It's also a good idea to know your test results and keep a list of the medicines you take. Where can you learn more? Go to http://anitra-tran.info/. Enter V649 in the search box to learn more about \"Learning About Diabetes Food Guidelines. \" Current as of: March 13, 2017 Content Version: 11.4 © 6514-0957 [employer]. Care instructions adapted under license by [employer] (which disclaims liability or warranty for this information). If you have questions about a medical condition or this instruction, always ask your healthcare professional. [place] [address] any warranty or liability for your use of this information. Counting Carbohydrates When You Take Insulin: Care Instructions Your Care Instructions You don't have to eat special foods when you take insulin. You just have to be careful to eat healthy foods. And you have to spread throughout the day the carbohydrates you eat. Carbohydrates raise blood sugar higher and more quickly than any other nutrient. It is found in desserts, breads and cereals, and fruit. It's also found in starchy vegetables such as potatoes and corn, grains such as rice and pasta, and milk and yogurt. The more carbohydrates, or carbs, you eat at one time, the higher your blood sugar will rise. Spreading carbs throughout the day helps keep your blood sugar levels within your target range. Counting carbs is one of the best ways to keep your blood sugar under control when you use insulin.  It helps you match the right amount of insulin to the number of grams of carbohydrates in a meal. You need to test your blood sugar several times a day to learn how carbs affect you. Then you can change your diet and insulin dose as needed. A registered dietitian or certified diabetes educator can help you plan meals and snacks. Follow-up care is a key part of your treatment and safety. Be sure to make and go to all appointments, and call your doctor if you are having problems. It's also a good idea to know your test results and keep a list of the medicines you take. How can you care for yourself at home? Know your daily amount of carbohydrates Your daily amount depends on several things, including your weight, how active you are, which diabetes medicines you take, and what your goals are for your blood sugar levels. A registered dietitian or certified diabetes educator can help you plan how many carbohydrates to include in each meal and snack. For most adults, a guideline for the daily amount of carbohydrates is: · 45 to 60 grams at each meal. That's about the same as 3 to 4 carbohydrate servings. · 15 to 20 grams at each snack. That's about the same as 1 carbohydrate serving. Count carbs If you take insulin, you need to know how many grams of carbohydrates are in a meal. This lets you know how much rapid-acting insulin to take before you eat. If you use an insulin pump, you get a constant rate of insulin during the day. So the pump must be programmed at meals to give you extra insulin to cover the rise in blood sugar after meals. When you know how many carbohydrates you will eat, you can take the right amount of insulin. Or, if you always use the same amount of insulin, you need to make sure that you eat the same amount of carbs at meals. · Learn your own insulin-to-carbohydrates ratio. You and your diabetes health professional will figure out the ratio. You can do this by testing your blood sugar after meals.  For example, you may need a certain amount of insulin for every 15 grams of carbohydrates. · Add up the carbohydrate grams in a meal. Then you can figure out how many units of insulin to take based on your insulin-to-carbohydrates ratio. · Look at labels on packaged foods. This can tell you how many carbohydrates are in a serving. You can also use guides from the American [employer] Association. · Be aware of portions, or serving sizes. If a package has two servings and you eat the whole package, you need to double the number of grams of carbohydrates listed for one serving. · Protein, fat, and fiber do not raise blood sugar as much as carbs do. If you eat a lot of these nutrients in a meal, your blood sugar will rise more slowly than it would otherwise. · Exercise lowers blood sugar. You can use less insulin than you would if you were not doing exercise. Keep in mind that timing matters. If you exercise within 1 hour after a meal, your body may need less insulin for that meal than it would if you exercised 3 hours after the meal. Test your blood sugar to find out how exercise affects your need for insulin. Eat from all food groups · Eat at least three meals a day. · Plan meals to include food from all the food groups. ¨ Grains: 1 slice of bread (1 ounce), ½ cup of cooked cereal, and 1/3 cup of cooked pasta or rice. These have about 15 grams of carbohydrates in a serving. Choose whole grains. These include whole wheat bread or crackers, oatmeal, and brown rice. Have them more often than refined grains. ¨ Fruit: 1 small fresh fruit, such as an apple or orange; ½ of a banana; ½ cup of chopped, cooked, or canned fruit; ½ cup of fruit juice; 1 cup of melon or raspberries; and 2 tablespoons of dried fruit. These have about 15 grams of carbohydrates in a serving. ¨ Dairy: 1 cup of nonfat or low-fat milk and 2/3 cup of plain yogurt. These have about 15 grams of carbohydrates in a serving. ¨ Protein foods: Beef, chicken, turkey, fish, eggs, tofu, cheese, cottage cheese, and peanut butter. A serving size of meat is 3 ounces. This is about the size of a deck of cards. Examples of meat substitute serving sizes (equal to 1 ounce of meat) are 1/4 cup of cottage cheese, 1 egg, 1 tablespoon of peanut butter, and ½ cup of tofu. These have very little or no carbohydrates in a serving. ¨ Vegetables: Starchy vegetables such as ½ cup of cooked beans, peas, potatoes, or corn have about 15 grams of carbohydrates. Nonstarchy vegetables have very little carbohydrates. These include 1 cup of raw leafy vegetables (such as spinach), ½ cup of other vegetables (cooked or chopped), and 3/4 cup of vegetable juice. · Talk to your dietitian or diabetes educator about ways to add limited amounts of sweets into your meal plan. · If you drink alcohol: ¨ Limit it to no more than 1 drink a day for women and 2 drinks a day for men. (One drink is 12 fl oz of beer, 5 fl oz of wine, or 1.5 fl oz liquor.) ¨ Make sure to count drink mixers that have sugar in your total carbohydrate count. These include cola, tonic water, jimenez mix, and fruit juice. ¨ Eat a carbohydrate food along with your alcoholic drink. ¨ Check your blood sugar more often. This is because alcohol can lower your blood sugar too much. This may happen even hours later while you sleep. You may want to eat and adjust your insulin dose when you drink alcohol to prevent severe low blood sugar. ¨ Talk to your doctor. Alcohol may not be recommended when you are taking certain diabetes medicines. Where can you learn more? Go to http://anitra-tran.info/. Enter Y118 in the search box to learn more about \"Counting Carbohydrates When You Take Insulin: Care Instructions. \" Current as of: March 13, 2017 Content Version: 11.4 © 3748-3358 Healthwise, Incorporated.  Care instructions adapted under license by [address] (which disclaims liability or warranty for this information). If you have questions about a medical condition or this instruction, always ask your healthcare professional. [address] any warranty or liability for your use of this information. Nutrition Tips for Diabetes: After Your Visit Your Care Instructions A healthy diet is important to manage diabetes. It helps you lose weight (if you need to) and keep it off. It gives you the nutrition and energy your body needs and helps prevent heart disease. But a diet for diabetes does not mean that you have to eat special foods. You can eat what your family eats, including occasional sweets and other favorites. But you do have to pay attention to how often you eat and how much you eat of certain foods. The right plan for you will give you meals that help you keep your blood sugar at healthy levels. Try to eat a variety of foods and to spread carbohydrate throughout the day. Carbohydrate raises blood sugar higher and more quickly than any other nutrient does. Carbohydrate is found in sugar, breads and cereals, fruit, starchy vegetables such as potatoes and corn, and milk and yogurt. You may want to work with a dietitian or diabetes educator to help you plan meals and snacks. A dietitian or diabetes educator also can help you lose weight if that is one of your goals. The following tips can help you enjoy your meals and stay healthy. Follow-up care is a key part of your treatment and safety. Be sure to make and go to all appointments, and call your doctor if you are having problems. Its also a good idea to know your test results and keep a list of the medicines you take. How can you care for yourself at home? · Learn which foods have carbohydrate and how much carbohydrate to eat. A dietitian or diabetes educator can help you learn to keep track of how much carbohydrate you eat. · Spread carbohydrate throughout the day. Eat some carbohydrate at all meals, but do not eat too much at any one time. · Plan meals to include food from all the food groups. These are the food groups and some example portion sizes: ¨ Grains: 1 slice of bread (1 ounce), ½ cup of cooked cereal, and 1/3 cup of cooked pasta or rice. These have about 15 grams of carbohydrate in a serving. Choose whole grains such as whole wheat bread or crackers, oatmeal, and brown rice more often than refined grains. ¨ Fruit: 1 small fresh fruit, such as an apple or orange; ½ of a banana; ½ cup of chopped, cooked, or canned fruit; ½ cup of fruit juice; 1 cup of melon or raspberries; and 2 tablespoons of dried fruit. These have about 15 grams of carbohydrate in a serving. ¨ Dairy: 1 cup of nonfat or low-fat milk and 2/3 cup of plain yogurt. These have about 15 grams of carbohydrate in a serving. ¨ Protein foods: Beef, chicken, turkey, fish, eggs, tofu, cheese, cottage cheese, and peanut butter. A serving size of meat is 3 ounces, which is about the size of a deck of cards. Examples of meat substitute serving sizes (equal to 1 ounce of meat) are 1/4 cup of cottage cheese, 1 egg, 1 tablespoon of peanut butter, and ½ cup of tofu. These have very little or no carbohydrate per serving. ¨ Vegetables: Starchy vegetables such as ½ cup of cooked dried beans, peas, potatoes, or corn have about 15 grams of carbohydrate. Nonstarchy vegetables have very little carbohydrate, such as 1 cup of raw leafy vegetables (such as spinach), ½ cup of other vegetables (cooked or chopped), and 3/4 cup of vegetable juice. · Use the plate format to plan meals. It is a good, quick way to make sure that you have a balanced meal. It also helps you spread carbohydrate throughout the day. You divide your plate by types of foods.  Put vegetables on half the plate, meat or meat substitutes on one-quarter of the plate, and a grain or starchy vegetable (such as brown rice or a potato) in the final quarter of the plate. To this you can add a small piece of fruit and 1 cup of milk or yogurt, depending on how much carbohydrate you are supposed to eat at a meal. 
· Talk to your dietitian or diabetes educator about ways to add limited amounts of sweets into your meal plan. You can eat these foods now and then, as long as you include the amount of carbohydrate they have in your daily carbohydrate allowance. · If you drink alcohol, limit it to no more than 1 drink a day for women and 2 drinks a day for men. If you are pregnant, no amount of alcohol is known to be safe. · Protein, fat, and fiber do not raise blood sugar as much as carbohydrate does. If you eat a lot of these nutrients in a meal, your blood sugar will rise more slowly than it would otherwise. · Limit saturated fats, such as those from meat and dairy products. Try to replace it with monounsaturated fat, such as olive oil. This is a healthier choice because people who have diabetes are at higher-than-average risk of heart disease. But use a modest amount of olive oil. A tablespoon of olive oil has 14 grams of fat and 120 calories. · Exercise lowers blood sugar. If you take insulin by shots or pump, you can use less than you would if you were not exercising. Keep in mind that timing matters. If you exercise within 1 hour after a meal, your body may need less insulin for that meal than it would if you exercised 3 hours after the meal. Test your blood sugar to find out how exercise affects your need for insulin. · Exercise on most days of the week. Aim for at least 30 minutes. Exercise helps you stay at a healthy weight and helps your body use insulin. Walking is an easy way to get exercise. Gradually increase the amount you walk every day. You also may want to swim, bike, or do other activities. When you eat out · Learn to estimate the serving sizes of foods that have carbohydrate. If you measure food at home, it will be easier to estimate the amount in a serving of restaurant food. · If the meal you order has too much carbohydrate (such as potatoes, corn, or baked beans), ask to have a low-carbohydrate food instead. Ask for a salad or green vegetables. · If you use insulin, check your blood sugar before and after eating out to help you plan how much to eat in the future. · If you eat more carbohydrate at a meal than you had planned, take a walk or do other exercise. This will help lower your blood sugar. Where can you learn more? Go to [employer].be Enter P735 in the search box to learn more about \"Nutrition Tips for Diabetes: After Your Visit. \"  
© 3029-2491 Healthwise, Incorporated. Care instructions adapted under license by [employer] (which disclaims liability or warranty for this information). This care instruction is for use with your licensed healthcare professional. If you have questions about a medical condition or this instruction, always ask your healthcare professional. [address] any warranty or liability for your use of this information. Content Version: 60.6.103423; Current as of: June 4, 2014 Please provide this summary of care documentation to your next provider. Signatures-by signing, you are acknowledging that this After Visit Summary has been reviewed with you and you have received a copy. Patient Signature:  ____________________________________________________________ Date:  ____________________________________________________________  
  
Leesa Clemens Provider Signature:  ____________________________________________________________ Date:  ____________________________________________________________

## 2018-03-29 PROBLEM — F10.20 ALCOHOL USE DISORDER, MODERATE, DEPENDENCE (HCC): Status: ACTIVE | Noted: 2018-03-29

## 2018-03-29 PROBLEM — F33.2 MDD (MAJOR DEPRESSIVE DISORDER), RECURRENT SEVERE, WITHOUT PSYCHOSIS (HCC): Status: ACTIVE | Noted: 2018-03-29

## 2018-03-29 PROBLEM — F10.930 ALCOHOL WITHDRAWAL, UNCOMPLICATED (HCC): Status: ACTIVE | Noted: 2018-03-29

## 2018-03-29 PROBLEM — F10.20 ALCOHOL USE DISORDER, SEVERE, DEPENDENCE (HCC): Chronic | Status: ACTIVE | Noted: 2018-03-29

## 2018-03-29 PROBLEM — F32.A DEPRESSION WITH SUICIDAL IDEATION: Status: ACTIVE | Noted: 2018-03-29

## 2018-03-29 PROBLEM — R45.851 DEPRESSION WITH SUICIDAL IDEATION: Status: ACTIVE | Noted: 2018-03-29

## 2018-03-29 LAB
ATRIAL RATE: 93 BPM
CALCULATED P AXIS, ECG09: 58 DEGREES
CALCULATED R AXIS, ECG10: -28 DEGREES
CALCULATED T AXIS, ECG11: 32 DEGREES
DIAGNOSIS, 93000: NORMAL
EST. AVERAGE GLUCOSE BLD GHB EST-MCNC: 240 MG/DL
GLUCOSE BLD STRIP.AUTO-MCNC: 279 MG/DL (ref 70–110)
GLUCOSE BLD STRIP.AUTO-MCNC: 327 MG/DL (ref 70–110)
GLUCOSE BLD STRIP.AUTO-MCNC: 332 MG/DL (ref 70–110)
HBA1C MFR BLD: 10 % (ref 4.2–5.6)
P-R INTERVAL, ECG05: 170 MS
Q-T INTERVAL, ECG07: 366 MS
QRS DURATION, ECG06: 100 MS
QTC CALCULATION (BEZET), ECG08: 455 MS
TSH SERPL DL<=0.05 MIU/L-ACNC: 0.99 UIU/ML (ref 0.36–3.74)
VENTRICULAR RATE, ECG03: 93 BPM

## 2018-03-29 PROCEDURE — 74011250637 HC RX REV CODE- 250/637: Performed by: PSYCHIATRY & NEUROLOGY

## 2018-03-29 PROCEDURE — 83036 HEMOGLOBIN GLYCOSYLATED A1C: CPT | Performed by: PSYCHIATRY & NEUROLOGY

## 2018-03-29 PROCEDURE — 74011636637 HC RX REV CODE- 636/637: Performed by: PSYCHIATRY & NEUROLOGY

## 2018-03-29 PROCEDURE — 65220000003 HC RM SEMIPRIVATE PSYCH

## 2018-03-29 PROCEDURE — 36415 COLL VENOUS BLD VENIPUNCTURE: CPT | Performed by: PSYCHIATRY & NEUROLOGY

## 2018-03-29 PROCEDURE — 74011636637 HC RX REV CODE- 636/637: Performed by: NURSE PRACTITIONER

## 2018-03-29 PROCEDURE — 74011250637 HC RX REV CODE- 250/637: Performed by: NURSE PRACTITIONER

## 2018-03-29 PROCEDURE — 84443 ASSAY THYROID STIM HORMONE: CPT | Performed by: PSYCHIATRY & NEUROLOGY

## 2018-03-29 PROCEDURE — 93005 ELECTROCARDIOGRAM TRACING: CPT

## 2018-03-29 PROCEDURE — 82962 GLUCOSE BLOOD TEST: CPT

## 2018-03-29 RX ORDER — LORAZEPAM 1 MG/1
1 TABLET ORAL
Status: DISCONTINUED | OUTPATIENT
Start: 2018-03-29 | End: 2018-04-02 | Stop reason: HOSPADM

## 2018-03-29 RX ORDER — TRAZODONE HYDROCHLORIDE 50 MG/1
50 TABLET ORAL
Status: DISCONTINUED | OUTPATIENT
Start: 2018-03-29 | End: 2018-03-30

## 2018-03-29 RX ORDER — LORAZEPAM 1 MG/1
2 TABLET ORAL
Status: DISCONTINUED | OUTPATIENT
Start: 2018-03-29 | End: 2018-04-02 | Stop reason: HOSPADM

## 2018-03-29 RX ORDER — HALOPERIDOL 10 MG/1
5 TABLET ORAL
Status: COMPLETED | OUTPATIENT
Start: 2018-03-29 | End: 2018-03-29

## 2018-03-29 RX ORDER — HALOPERIDOL 5 MG/ML
5 INJECTION INTRAMUSCULAR
Status: DISCONTINUED | OUTPATIENT
Start: 2018-03-29 | End: 2018-04-02 | Stop reason: HOSPADM

## 2018-03-29 RX ORDER — HALOPERIDOL 5 MG/1
5 TABLET ORAL
Status: DISCONTINUED | OUTPATIENT
Start: 2018-03-29 | End: 2018-04-02 | Stop reason: HOSPADM

## 2018-03-29 RX ORDER — LORAZEPAM 2 MG/ML
2 INJECTION INTRAMUSCULAR
Status: DISCONTINUED | OUTPATIENT
Start: 2018-03-29 | End: 2018-04-02 | Stop reason: HOSPADM

## 2018-03-29 RX ORDER — IBUPROFEN 200 MG
1 TABLET ORAL DAILY
Status: DISCONTINUED | OUTPATIENT
Start: 2018-03-30 | End: 2018-04-02 | Stop reason: HOSPADM

## 2018-03-29 RX ORDER — CITALOPRAM 20 MG/1
20 TABLET, FILM COATED ORAL EVERY EVENING
Status: DISCONTINUED | OUTPATIENT
Start: 2018-03-29 | End: 2018-04-02 | Stop reason: HOSPADM

## 2018-03-29 RX ORDER — LORAZEPAM 1 MG/1
1 TABLET ORAL
Status: COMPLETED | OUTPATIENT
Start: 2018-03-29 | End: 2018-03-29

## 2018-03-29 RX ORDER — IBUPROFEN 600 MG/1
600 TABLET ORAL
Status: DISCONTINUED | OUTPATIENT
Start: 2018-03-29 | End: 2018-04-02 | Stop reason: HOSPADM

## 2018-03-29 RX ORDER — LORAZEPAM 2 MG/ML
1 INJECTION INTRAMUSCULAR
Status: DISCONTINUED | OUTPATIENT
Start: 2018-03-29 | End: 2018-04-02 | Stop reason: HOSPADM

## 2018-03-29 RX ORDER — ARIPIPRAZOLE 5 MG/1
5 TABLET ORAL DAILY
Status: DISCONTINUED | OUTPATIENT
Start: 2018-03-29 | End: 2018-03-31

## 2018-03-29 RX ORDER — INSULIN GLARGINE 100 [IU]/ML
20 INJECTION, SOLUTION SUBCUTANEOUS
Status: DISCONTINUED | OUTPATIENT
Start: 2018-03-29 | End: 2018-03-30

## 2018-03-29 RX ORDER — INSULIN LISPRO 100 [IU]/ML
INJECTION, SOLUTION INTRAVENOUS; SUBCUTANEOUS
Status: DISCONTINUED | OUTPATIENT
Start: 2018-03-29 | End: 2018-04-02 | Stop reason: HOSPADM

## 2018-03-29 RX ORDER — MAGNESIUM SULFATE HEPTAHYDRATE 500 MG/ML
1 INJECTION, SOLUTION INTRAMUSCULAR; INTRAVENOUS
Status: ACTIVE | OUTPATIENT
Start: 2018-03-29 | End: 2018-03-31

## 2018-03-29 RX ADMIN — INSULIN LISPRO 8 UNITS: 100 INJECTION, SOLUTION INTRAVENOUS; SUBCUTANEOUS at 21:34

## 2018-03-29 RX ADMIN — INSULIN LISPRO 6 UNITS: 100 INJECTION, SOLUTION INTRAVENOUS; SUBCUTANEOUS at 17:01

## 2018-03-29 RX ADMIN — INSULIN GLARGINE 20 UNITS: 100 INJECTION, SOLUTION SUBCUTANEOUS at 09:24

## 2018-03-29 RX ADMIN — Medication 2 CAPSULE: at 13:24

## 2018-03-29 RX ADMIN — INSULIN HUMAN 8 UNITS: 100 INJECTION, SOLUTION PARENTERAL at 04:00

## 2018-03-29 RX ADMIN — INSULIN GLARGINE 20 UNITS: 100 INJECTION, SOLUTION SUBCUTANEOUS at 17:01

## 2018-03-29 RX ADMIN — CITALOPRAM HYDROBROMIDE 20 MG: 20 TABLET ORAL at 17:35

## 2018-03-29 RX ADMIN — LORAZEPAM 1 MG: 1 TABLET ORAL at 02:38

## 2018-03-29 RX ADMIN — Medication 2 CAPSULE: at 17:00

## 2018-03-29 RX ADMIN — HALOPERIDOL 5 MG: 10 TABLET ORAL at 02:38

## 2018-03-29 RX ADMIN — ARIPIPRAZOLE 5 MG: 5 TABLET ORAL at 13:24

## 2018-03-29 RX ADMIN — TRAZODONE HYDROCHLORIDE 50 MG: 50 TABLET ORAL at 20:14

## 2018-03-29 NOTE — BSMART NOTE
OCCUPATIONAL THERAPY PROGRESS NOTE  Group Time:  [id number]  Attendance: The patient attended full group. Participation:  The patient participated with minimal elaboration in the activity. Attention:  The patient was able to focus on the activity. Interaction:  The patient acknowledges others or responds to questions,  with no spontaneous interaction. Participated as asked. Answers appropriate.

## 2018-03-29 NOTE — BH NOTES
Arash Shepard is not participating in AA/ Emotions Anonymous Group. .     Group time: 4869-3551    Personal goal for participation: Educate on Substance abuse    Goal orientation: passive    Group therapy participation: refuse    Therapeutic interventions reviewed and discussed:  Staff encouraged Pt. To participate in group    Impression of participation: Pt. Refuse chose to rest in bed despite staff encouragement.

## 2018-03-29 NOTE — BH NOTES
GROUP THERAPY PROGRESS NOTE    Lluvia Lilly is not participating in Recreational Therapy. Group time: 1 hour    Personal goal for participation: fresh air break    Goal orientation: passive    Group therapy participation: refuse    Therapeutic interventions reviewed and discussed: Staff encouraged Pt to participate in group. Impression of participation: Pt refuse chose to rest in bed despite staff encouragement.
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
## 2018-03-29 NOTE — ROUTINE PROCESS
Patient was escorted to the unit via w/c accompanied by hospital . Pt was alert and oriented but drowsy from medication given at 02 am in ED. Pt admitted for depression with suicidal thought. Pt  cooperative during the assessment. Pt  was oriented to unit and expectations related to treatment. Patient verbalized understanding of all information provided. Staff continues to monitor for safety and provide a supportive environment.

## 2018-03-29 NOTE — BH NOTES
Pt isolative, in room most of shift; mood sad, affect somber/worthless; cooperative, med compliant, mediocre group compliance, interaction minimal to none, goal oriented : \"I got to change my life, got to stop feelin' like I'm no good to myself and my family\", hygiene fair, meals tolerated well, endorses decreasing SI since AM psych rounds. No behavioral issues during shift, denies HI and A/V hallucinations, involved in no falls this shift - skid resistant footwear utilized and floor kept free of items that could precipitate a fall.
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
## 2018-03-29 NOTE — DIABETES MGMT
GLYCEMIC CONTROL PLAN OF CARE    Assessment:  Pt is 29 yr old male admitted on 3/28/18 for SI. Pt with past medical history significant for T2DM, Depression, HTN, HLD, HTN, Body mass index is 37.23 kg/(m^2). -Obesity. Recommendations:  Diabetic education. Pt BG above target, recommend add 20 units lantus two times daily. Monitor for need to adjust based on BG trends. Advance per protocol to very insulin resistant scale lispro. Will continue to monitor inpatient for intervention.     Most recent blood glucose values:     Lab Results   Component Value Date/Time     (H) 03/28/2018 09:38 PM    GLUCPOC 327 (H) 03/29/2018 01:11 AM     Current A1C:  New one pending last one from 2014 was 7.9%    Current hospital diabetes medications:  Correctional lispro ACHS- normal insulin sensitivity scale    Diet:   Diabetic consistent carbohydrate 2400 kcal    Home diabetes medications:  Pt reports he used to take levemir 40 units two times daily but lost his job and insurance and has not taken insulin in some time    Goals:  Pt BG will be within target range by _4/01/18____    Education:  _x__  Refer to Diabetes Education Record             ___  Education not indicated at this time    Katelyn Zelaya [address], [address] [place] [address], [facility]  Pager: [phone]

## 2018-03-29 NOTE — ED NOTES
Pt c/o feeling depressed, Pt states \" I have been using alcohol to self medicate\". Pt reports hitting head on wall last night pt has abrasions to left side of face above eye and below eye.

## 2018-03-29 NOTE — DIABETES MGMT
Diabetes Patient/Family Education Record  Pt reports he used to take levemir but lost his job and his insurance and he has not taken insulin in a while. Factors That  May Influence Patients Ability  to Learn or  Comply with Recommendations   []   Language barrier    []   Cultural needs   [x]   Motivation    [x]   Cognitive limitation    []   Physical   [x]   Education    []   Physiological factors   []   Hearing/vision/speaking impairment   []   Amish beliefs    []   Financial factors   []  Other:   []  No factors identified at this time.      Person Instructed:   [x]   Patient   []   Family   []  Other     Preference for Learning:   [x]   Verbal   []   Written   []  Demonstration     Level of Comprehension & Competence:    []  Good                                      [x] Fair                                     []  Poor                             [x]  Needs Reinforcement   [x]  Teachback completed    Education Component:   [x]  Medication management,     [x]  Nutritional management    []  Exercise   []  Signs, symptoms, and treatment of hyperglycemia and hypoglycemia   [] Prevention, recognition and treatment of hyperglycemia and hypoglycemia   []  Importance of blood glucose monitoring and how to obtain a blood glucose meter    []  Instruction on use of the blood glucose meter   [x]  Discuss the importance of HbA1C monitoring    []  Sick day guidelines   []  Proper use and disposal of lancets, needles, syringes or insulin pens (if appropriate)   [x]  Potential long-term complications (retinopathy, kidney disease, neuropathy, foot care)   [x] Information about whom to contact in case of emergency or for more information    [x]  Goal:  Patient/family will demonstrate understanding of Diabetes Self Management Skills by: (date) _4/5/18______  Plan for post-discharge education or self-management support:    [] Outpatient class schedule provided            [x] Patient Declined    [] Scheduled for outpatient classes (date) _______     Megan Chaudhari MS, RD, CDE  Pager: [phone]

## 2018-03-30 LAB
CHOLEST SERPL-MCNC: 363 MG/DL
GLUCOSE BLD STRIP.AUTO-MCNC: 252 MG/DL (ref 70–110)
GLUCOSE BLD STRIP.AUTO-MCNC: 280 MG/DL (ref 70–110)
GLUCOSE BLD STRIP.AUTO-MCNC: 304 MG/DL (ref 70–110)
GLUCOSE BLD STRIP.AUTO-MCNC: 389 MG/DL (ref 70–110)
HDLC SERPL-MCNC: 45 MG/DL (ref 40–60)
HDLC SERPL: 8.1 {RATIO} (ref 0–5)
LDLC SERPL CALC-MCNC: 246.8 MG/DL (ref 0–100)
LIPID PROFILE,FLP: ABNORMAL
TRIGL SERPL-MCNC: 356 MG/DL (ref ?–150)
VLDLC SERPL CALC-MCNC: 71.2 MG/DL

## 2018-03-30 PROCEDURE — 82962 GLUCOSE BLOOD TEST: CPT

## 2018-03-30 PROCEDURE — 74011636637 HC RX REV CODE- 636/637: Performed by: PSYCHIATRY & NEUROLOGY

## 2018-03-30 PROCEDURE — 74011250637 HC RX REV CODE- 250/637: Performed by: PSYCHIATRY & NEUROLOGY

## 2018-03-30 PROCEDURE — 36415 COLL VENOUS BLD VENIPUNCTURE: CPT | Performed by: PSYCHIATRY & NEUROLOGY

## 2018-03-30 PROCEDURE — 65220000003 HC RM SEMIPRIVATE PSYCH

## 2018-03-30 PROCEDURE — 80061 LIPID PANEL: CPT | Performed by: PSYCHIATRY & NEUROLOGY

## 2018-03-30 RX ORDER — INSULIN GLARGINE 100 [IU]/ML
10 INJECTION, SOLUTION SUBCUTANEOUS
Status: COMPLETED | OUTPATIENT
Start: 2018-03-30 | End: 2018-03-30

## 2018-03-30 RX ORDER — TRAZODONE HYDROCHLORIDE 100 MG/1
100 TABLET ORAL
Status: DISCONTINUED | OUTPATIENT
Start: 2018-03-30 | End: 2018-04-02 | Stop reason: HOSPADM

## 2018-03-30 RX ORDER — INSULIN LISPRO 100 [IU]/ML
3 INJECTION, SOLUTION INTRAVENOUS; SUBCUTANEOUS
Status: DISCONTINUED | OUTPATIENT
Start: 2018-03-30 | End: 2018-03-31

## 2018-03-30 RX ORDER — INSULIN GLARGINE 100 [IU]/ML
30 INJECTION, SOLUTION SUBCUTANEOUS
Status: DISCONTINUED | OUTPATIENT
Start: 2018-03-30 | End: 2018-03-31

## 2018-03-30 RX ADMIN — INSULIN LISPRO 3 UNITS: 100 INJECTION, SOLUTION INTRAVENOUS; SUBCUTANEOUS at 11:40

## 2018-03-30 RX ADMIN — Medication 2 CAPSULE: at 16:40

## 2018-03-30 RX ADMIN — CITALOPRAM HYDROBROMIDE 20 MG: 20 TABLET ORAL at 17:02

## 2018-03-30 RX ADMIN — Medication 2 CAPSULE: at 11:41

## 2018-03-30 RX ADMIN — INSULIN LISPRO 10 UNITS: 100 INJECTION, SOLUTION INTRAVENOUS; SUBCUTANEOUS at 21:10

## 2018-03-30 RX ADMIN — ARIPIPRAZOLE 5 MG: 5 TABLET ORAL at 08:26

## 2018-03-30 RX ADMIN — INSULIN LISPRO 6 UNITS: 100 INJECTION, SOLUTION INTRAVENOUS; SUBCUTANEOUS at 08:27

## 2018-03-30 RX ADMIN — INSULIN LISPRO 6 UNITS: 100 INJECTION, SOLUTION INTRAVENOUS; SUBCUTANEOUS at 11:40

## 2018-03-30 RX ADMIN — INSULIN GLARGINE 10 UNITS: 100 INJECTION, SOLUTION SUBCUTANEOUS at 09:57

## 2018-03-30 RX ADMIN — INSULIN GLARGINE 30 UNITS: 100 INJECTION, SOLUTION SUBCUTANEOUS at 16:44

## 2018-03-30 RX ADMIN — INSULIN GLARGINE 20 UNITS: 100 INJECTION, SOLUTION SUBCUTANEOUS at 08:27

## 2018-03-30 RX ADMIN — Medication 2 CAPSULE: at 08:26

## 2018-03-30 RX ADMIN — INSULIN LISPRO 8 UNITS: 100 INJECTION, SOLUTION INTRAVENOUS; SUBCUTANEOUS at 16:28

## 2018-03-30 RX ADMIN — INSULIN LISPRO 3 UNITS: 100 INJECTION, SOLUTION INTRAVENOUS; SUBCUTANEOUS at 16:30

## 2018-03-30 NOTE — BH NOTES
GROUP THERAPY PROGRESS NOTE    Ebony Parker is participating in Recreational Therapy. Group time: 1 hour    Personal goal for participation: fresh air break/games on the unit    Goal orientation: social    Group therapy participation: active    Therapeutic interventions reviewed and discussed: Staff encouraged Pt to get off the unit and go outside and get some fresh air, or play games on the unit with peers. Impression of participation:    Pt. chose to stay on the unit, play games with peers, color marci patterns and watch a movie.

## 2018-03-30 NOTE — PROGRESS NOTES
Problem: Suicide/Homicide (Adult/Pediatric)  Goal: *STG: Remains safe in hospital  Pt will contract for safety and remains safe daily. Outcome: Progressing Towards Goal  Patient stays safe in hospital daily. Problem: Falls - Risk of  Goal: *Absence of Falls  Document Karey Fall Risk and appropriate interventions in the flowsheet. Pt will have no fall during this admission. Outcome: Progressing Towards Goal  Fall Risk Interventions:absent of falls daily            Medication Interventions: Teach patient to arise slowly                  Problem: Alcohol Withdrawal  Goal: *STG: Participates in treatment plan  Pt will attend and participate in group therapy while in the hospital.   Outcome: Progressing Towards Goal  Participates in treatment plan daily. Comments: Denies SI HI AVH. Offers no complaints. Eats and tolerates evening meal. Takes medicines without incident. Gripper socks and 15 minute checks in place for safety. Interacts appropriately with staff and peers. Will continue to monitor and support.

## 2018-03-30 NOTE — PROGRESS NOTES
Problem: Suicide/Homicide (Adult/Pediatric)  Goal: *STG: Remains safe in hospital  Pt will contract for safety and remains safe daily. Outcome: Progressing Towards Goal  Remains safe in hospital daily. Problem: Falls - Risk of  Goal: *Absence of Falls  Document Karey Fall Risk and appropriate interventions in the flowsheet. Pt will have no fall during this admission. Outcome: Progressing Towards Goal  Fall Risk Interventions:absent of falls daily            Medication Interventions: Teach patient to arise slowly                  Problem: Alcohol Withdrawal  Goal: *STG: Participates in treatment plan  Pt will attend and participate in group therapy while in the hospital.   Outcome: Progressing Towards Goal  Participates in treatment plan daily. Comments: Denies SI HI AVH. Offers no complaints. Stays to self quiet in room. Does act appropriately with staff and peers. Sad. Flat. Dull. Eats and tolerates evening meal. Takes medicines without incident. Cooperative. Gripper socks and 15 minute checks in place for safety. Will continue to monitor and support.

## 2018-03-30 NOTE — BSMART NOTE
Pt in day area more this shift and reports that he does feel better. Good appetite and seems to be responsive on    approach by others. Did attend group although voiced no major concerns. Was seen by the dietitian this afternoon. PT.   had some phone activity and was seen by doctor. Offer support and  Encouragement to focus on treatment. Safety   wearing non skid socks and informed to seek out staff if needed to talk.

## 2018-03-30 NOTE — BH NOTES
GROUP THERAPY PROGRESS NOTE    Wei Boudreaux is participating in [employer]. Group time: 30 minutes    Personal goal for participation: Reviewed unit guidelines/rules/behaviors    Goal orientation: community    Group therapy participation: minimal    Therapeutic interventions reviewed and discussed: Encouraged to [articipate in treatment and   seek out doctors and staff regarding any issues. Impression of participation: Pt did attend but was without complaints.

## 2018-03-30 NOTE — PROGRESS NOTES
[facility]  Inpatient Progress Note     Date of Service: 03/30/18  Hospital Day: 1     Subjective/Interval History   03/30/18    Treatment Team Notes:  Notes reviewed and/or discussed and report that Fariha Ibrahim is depressed/melancholic. Felt better after family visited and remained in [address] [place] [address]. Patient interview: Fariha Ibrahim was interviewed by this writer today. Pt reports improved sleep but notes it was restless. Appetite is adequate. Rates admission depression as a 20/10 with 10 being the worst depression. Pt rates current depression as a 5/10 on the same scale. States medications are helpful. Pt is medication compliant and denies medication side effects including TD, EPS, akathisia and mood derangements. Denies SI, HI & AVH. Objective     Visit Vitals    /86 (BP 1 Location: Right arm, BP Patient Position: Sitting)    Pulse 99    Temp 97.9 °F (36.6 °C)    Resp 18    Ht 6' 2\" (1.88 m)    Wt 131.5 kg (290 lb)    SpO2 97%    BMI 37.23 kg/m2       Mental Status Examination     Appearance/Hygiene 29 y.o. AAM with fair hygiene. Abrasions on his forehead and left side of face due to striking his head on wall. Pt struck head on a wall prior to admission.     Behavior/Social Relatedness Appropriate  Relatedness is fair    Musculoskeletal Gait/Station: not assessed/appropriate - in bed  Tone (flaccid, cogwheeling, spastic): not assessed  Psychomotor (hyperkinetic, hypokinetic): psychomotor retardation - improving  Involuntary movements (tics, dyskinesias, akathisia, stereotypies): none   Speech                          Rate, rhythm, volume, fluency and articulation are appropriate   Mood                          sad   Affect                                                   sad   Thought Process Linear and goal directed     Vagueness, incoherence, circumstantiality, tangentiality, neologisms, perseveration, flight of ideas, or self-contradictory statements not present on assessment   Thought Content and Perceptual Disturbances Denies SI, HI and AVH. Not focused on death today. Denies delusions, ideas of reference, overvalued ideas, ruminations, obsession, compulsions, and phobias   Sensorium and Cognition              AOx4, attention intact, memory intact, language use appropriate, and fund of knowledge age appropriate   Insight              fair   Judgment fair         Assessment/Plan      Psychiatric Diagnoses:   MDD (major depressive disorder), recurrent severe, without psychosis ([address] F33.2   Alcohol use disorder, moderate, dependence ([address] F10.20   Alcohol withdrawal, uncomplicated ([address] O93.219         Medical Diagnoses:        Patient Active Problem List   Diagnosis Code    Asthma J45.909    HTN (hypertension) I10    Flexor tendon laceration of finger with open wound S56.129A, S61.209A    Hypertriglyceridemia E78.1    Diabetes mellitus, insulin dependent (IDDM), controlled ([address] E11.9, Z79.4    Hyperlipidemia LDL goal < 70 E78.5    Hypomagnesemia E83.42    Vitamin D deficiency E55.9    MDD (major depressive disorder), recurrent severe, without psychosis ([address] F33.2    Alcohol use disorder, moderate, dependence ([address] F10.20    Alcohol withdrawal, uncomplicated ([address] O61.336         Psychosocial and contextual factors:                         1.  Alcohol use                        2.  FHx of suicide                        3.  Recent stressors     Level of impairment/disability: Severe Reynaldo Caul is a 29 y.o. who is currently improving. Rates depression as a 5/10 with 10 being the worst depression  Pt rates admission depression as a 20/10 on the same scale. Pt feels medications are helpful. He denies medication side effects. Sleep is improved but remains fragmented. Pt willing to increase trazodone. Goal is to increase Abilify to 7.5-10mg po Qam prior to discharge. Will monitor serum glucoses.      1. Depression   - continue citalopram 20mg po QHS   - continue ability 5mg po Qam.   2.  Reviewed instructions, risks, benefits and side effects of medications  3. Disposition/Discharge Date: self-care/home, 4-2/3-2018.       MD [facility]. [facility]  [facility]

## 2018-03-30 NOTE — BH NOTES
Observed patient's behavior throughout the shift. The patient seems pleasant and relaxed throughout the shift. Patient has stated that he was feeling pretty good and was excited to see his family come to the facility and visited him. After visitation was over the patient remained in the dayroom and watched television, ate snacks and later went to his room and went to bed. Will continue to monitor patient's behavior and safety for the duration of the shift.

## 2018-03-30 NOTE — DIABETES MGMT
GLYCEMIC CONTROL PLAN OF CARE    Pt BG above target, recommend increase lantus to 30 units two times daily. Continue 2 units lispro and very insulin resistant scale lispro ACHS. Pt reports he is eating well and agrees with insulin adjustments. Recent Glucose Results:   Lab Results   Component Value Date/Time    GLUCPOC 280 (H) 03/30/2018 11:36 AM    GLUCPOC 252 (H) 03/30/2018 05:50 AM    GLUCPOC 332 (H) 03/29/2018 08:12 PM     TDD 3/29: 40 units lantus, 14 units lispro    Will continue to monitor inpatient for intervention.     Jess Brennan MS, [address] [place] [address], E  Pager: [phone]

## 2018-03-30 NOTE — PROGRESS NOTES
NUTRITION    Nutrition Consult: Other    RECOMMENDATIONS / PLAN:     - Modify diet order to include double protein and vegetable portion.  - Continue RD inpatient monitoring and evaluation. NUTRITION DIAGNOSIS & INTERVENTIONS:     [x] Meals/snacks: modify composition    Nutrition Diagnosis:  Inadequate energy intake related to current diet order in related to pt estimated nutritional needs as evidenced by pt on 2400 kcal diet. ASSESSMENT:     Pt with good appetite and meal intake. BG levels elevated, glycemic control following. Asking for double portions, agreeable to double protein and vegetable. Tolerating diet. Denied having any nutritional questions or concerns during time of visit. Average intake adequate to meet patients estimated nutritional needs:   [x] Yes     [] No      [] Unable to determine at this time    Diet: DIET DIABETIC WITH OPTIONS Consistent Carb 2400kcal; Regular    Food Allergies: NKFA  Current Appetite:   [x] Good     [] Fair     [] Poor     [] Other:  Appetite/meal intake prior to admission:   [x] Good     [] Fair     [] Poor     [] Other:   Feeding Limitations:  [] Swallowing Difficulty       [] Chewing Difficulty       [] Other   Current Meal Intake: No data found. Gastrointestinal Issues:  [] Yes    [x] No   Skin Integrity:  WDL    Pertinent Medications:  Reviewed: Jessica-recover, SSI   Labs:  Reviewed     Anthropometrics:  Ht Readings from Last 1 Encounters:   03/28/18 6' 2\" (1.88 m)       Last 3 Recorded Weights in this Encounter    03/28/18 1853   Weight: 131.5 kg (290 lb)       Body mass index is 37.23 kg/(m^2). Obese Class II    Weight History: Pt reports stable weight hx PTA.   Weight Metrics 3/28/2018 1/6/2015 6/9/2014 8/8/2013 4/10/2013   Weight 290 lb 290 lb 287 lb 6.4 oz 281 lb 247 lb   BMI 37.23 kg/m2 37.22 kg/m2 36.88 kg/m2 36.06 kg/m2 31.04 kg/m2       Admitting Diagnosis: Depression with suicidal ideation  Past Medical History:   Diagnosis Date    Alcohol use disorder, severe, dependence ([facility] 3/29/2018    Alcohol withdrawal, uncomplicated ([facility] 2/93/8171    Asthma     vs Reactive Airway Disease with normal PFT 7/7/14    Diabetes ([facility] 2012    HTN (hypertension)     Hyperlipidemia LDL goal < 70 12/2014    Leg pain, bilateral 2012    ? peripheral neuropathy    MDD (major depressive disorder), recurrent severe, without psychosis ([facility] 3/29/2018    Vitamin D deficiency 12/2014        Education Needs:        [x] None identified  [] Identified - Not appropriate at this time  []  Identified and addressed - refer to education log  Learning Limitations:   [x] None identified  [] Identified    Cultural, Protestant & ethnic food preferences identified:  [x] None    [] Yes      ESTIMATED NUTRITION NEEDS:     8363-2895 kcal (MSJx1.2-1.3), 105-131 gm protein (0.8-1 gm/kg), 1 mL/kcal  Based on: 131 kg       [x] Actual BW      [] IBW          MONITORING & EVALUATION:     Nutrition Goal(s):   1. Po intake of meals will meet >75% of patient estimated nutritional needs within the next 7 days.   Outcome:   [] Met    []  Not Met   [x] New/Initial Goal    Monitor:  [x] Food and beverage intake   [x] Diet order   [x] Nutrition-focused physical findings   [] Weight      Previous Recommendations (for follow-up assessments only):     []   Implemented       []   Not Implemented (RD to address)   [] No Longer Appropriate   [] No Recommendation Made       Discharge Planning: diabetic diet   [x]  Participated in care planning, discharge planning, & interdisciplinary rounds as appropriate      Rochelle Caba RD   Pager: [phone]

## 2018-03-31 LAB
GLUCOSE BLD STRIP.AUTO-MCNC: 239 MG/DL (ref 70–110)
GLUCOSE BLD STRIP.AUTO-MCNC: 275 MG/DL (ref 70–110)
GLUCOSE BLD STRIP.AUTO-MCNC: 321 MG/DL (ref 70–110)
GLUCOSE BLD STRIP.AUTO-MCNC: 334 MG/DL (ref 70–110)

## 2018-03-31 PROCEDURE — 82962 GLUCOSE BLOOD TEST: CPT

## 2018-03-31 PROCEDURE — 65220000003 HC RM SEMIPRIVATE PSYCH

## 2018-03-31 PROCEDURE — 74011636637 HC RX REV CODE- 636/637: Performed by: PSYCHIATRY & NEUROLOGY

## 2018-03-31 PROCEDURE — 74011250637 HC RX REV CODE- 250/637: Performed by: PSYCHIATRY & NEUROLOGY

## 2018-03-31 RX ORDER — METFORMIN HYDROCHLORIDE 750 MG/1
750 TABLET, EXTENDED RELEASE ORAL
Status: DISCONTINUED | OUTPATIENT
Start: 2018-03-31 | End: 2018-04-02 | Stop reason: HOSPADM

## 2018-03-31 RX ORDER — METHOCARBAMOL 500 MG/1
500 TABLET, FILM COATED ORAL
Status: DISCONTINUED | OUTPATIENT
Start: 2018-03-31 | End: 2018-04-01

## 2018-03-31 RX ORDER — INSULIN LISPRO 100 [IU]/ML
5 INJECTION, SOLUTION INTRAVENOUS; SUBCUTANEOUS
Status: DISCONTINUED | OUTPATIENT
Start: 2018-03-31 | End: 2018-04-02 | Stop reason: HOSPADM

## 2018-03-31 RX ORDER — INSULIN GLARGINE 100 [IU]/ML
40 INJECTION, SOLUTION SUBCUTANEOUS
Status: DISCONTINUED | OUTPATIENT
Start: 2018-03-31 | End: 2018-04-02 | Stop reason: HOSPADM

## 2018-03-31 RX ORDER — INSULIN GLARGINE 100 [IU]/ML
10 INJECTION, SOLUTION SUBCUTANEOUS
Status: COMPLETED | OUTPATIENT
Start: 2018-03-31 | End: 2018-03-31

## 2018-03-31 RX ORDER — ARIPIPRAZOLE 2 MG/1
2 TABLET ORAL
Status: COMPLETED | OUTPATIENT
Start: 2018-03-31 | End: 2018-03-31

## 2018-03-31 RX ORDER — ARIPIPRAZOLE 5 MG/1
7.5 TABLET ORAL DAILY
Status: DISCONTINUED | OUTPATIENT
Start: 2018-04-01 | End: 2018-04-02 | Stop reason: HOSPADM

## 2018-03-31 RX ADMIN — INSULIN LISPRO 6 UNITS: 100 INJECTION, SOLUTION INTRAVENOUS; SUBCUTANEOUS at 12:01

## 2018-03-31 RX ADMIN — INSULIN LISPRO 4 UNITS: 100 INJECTION, SOLUTION INTRAVENOUS; SUBCUTANEOUS at 07:49

## 2018-03-31 RX ADMIN — TRAZODONE HYDROCHLORIDE 100 MG: 100 TABLET ORAL at 00:52

## 2018-03-31 RX ADMIN — BACITRACIN ZINC, NEOMYCIN, POLYMYXIN B: 400; 3.5; 5 OINTMENT TOPICAL at 20:49

## 2018-03-31 RX ADMIN — METFORMIN HYDROCHLORIDE 750 MG: 750 TABLET, EXTENDED RELEASE ORAL at 16:48

## 2018-03-31 RX ADMIN — INSULIN LISPRO 3 UNITS: 100 INJECTION, SOLUTION INTRAVENOUS; SUBCUTANEOUS at 07:47

## 2018-03-31 RX ADMIN — INSULIN LISPRO 8 UNITS: 100 INJECTION, SOLUTION INTRAVENOUS; SUBCUTANEOUS at 21:15

## 2018-03-31 RX ADMIN — CITALOPRAM HYDROBROMIDE 20 MG: 20 TABLET ORAL at 17:11

## 2018-03-31 RX ADMIN — METHOCARBAMOL 500 MG: 500 TABLET ORAL at 21:15

## 2018-03-31 RX ADMIN — ARIPIPRAZOLE 5 MG: 5 TABLET ORAL at 08:46

## 2018-03-31 RX ADMIN — Medication 2 CAPSULE: at 08:46

## 2018-03-31 RX ADMIN — INSULIN GLARGINE 40 UNITS: 100 INJECTION, SOLUTION SUBCUTANEOUS at 17:31

## 2018-03-31 RX ADMIN — Medication 2 CAPSULE: at 16:48

## 2018-03-31 RX ADMIN — Medication 2 CAPSULE: at 12:06

## 2018-03-31 RX ADMIN — METHOCARBAMOL 500 MG: 500 TABLET ORAL at 14:42

## 2018-03-31 RX ADMIN — INSULIN GLARGINE 10 UNITS: 100 INJECTION, SOLUTION SUBCUTANEOUS at 10:26

## 2018-03-31 RX ADMIN — TRAZODONE HYDROCHLORIDE 100 MG: 100 TABLET ORAL at 21:15

## 2018-03-31 RX ADMIN — ARIPIPRAZOLE 2 MG: 2 TABLET ORAL at 12:06

## 2018-03-31 RX ADMIN — INSULIN LISPRO 5 UNITS: 100 INJECTION, SOLUTION INTRAVENOUS; SUBCUTANEOUS at 12:03

## 2018-03-31 RX ADMIN — IBUPROFEN 600 MG: 600 TABLET ORAL at 18:11

## 2018-03-31 RX ADMIN — INSULIN LISPRO 5 UNITS: 100 INJECTION, SOLUTION INTRAVENOUS; SUBCUTANEOUS at 16:45

## 2018-03-31 RX ADMIN — INSULIN GLARGINE 30 UNITS: 100 INJECTION, SOLUTION SUBCUTANEOUS at 08:11

## 2018-03-31 RX ADMIN — INSULIN LISPRO 8 UNITS: 100 INJECTION, SOLUTION INTRAVENOUS; SUBCUTANEOUS at 16:46

## 2018-03-31 NOTE — BH NOTES
Gunnar Almanza is  participating in [place]. Group time: 2757    Personal goal for participation: Community announcement    Goal orientation: community    Group therapy participation: fully participated    Therapeutic interventions reviewed and discussed: Staff discussed  Staff discussed the Mental Health programs offered. Unit schedule for groups,  Visiting hours, patient advocate name and phone number and where this information is posted on the unit, etc. Report any maintenance/housekeeping or treatment concerns to staff so it can be addressed by the Treatment Team.    Impression of participation: Pt.did not have any maintenance/housekeeping or treatment concerns to report to staff .

## 2018-03-31 NOTE — PROGRESS NOTES
Problem: Suicide/Homicide (Adult/Pediatric)  Goal: *STG/LTG: Complies with medication therapy  Pt will be medication compliant daily. Outcome: Progressing Towards Goal  Compliant. Goal: *STG/LTG:  No longer expresses self destructive or suicidal/homicidal thoughts  Pt will deny SI/HI prior to discharge. Outcome: Progressing Towards Goal  denies    Problem: Falls - Risk of  Goal: *Absence of Falls  Document Karey Fall Risk and appropriate interventions in the flowsheet. Pt will have no fall during this admission. Outcome: Progressing Towards Goal  Fall Risk Intervention       Medication Interventions: Teach patient to arise slowly                  Comments: Patient has been in bed except for meals. He states he did not sleep well last night. Denies S.  I.

## 2018-03-31 NOTE — PROGRESS NOTES
[address]  Inpatient Progress Note     Date of Service: 03/31/18  Hospital Day: 2     Subjective/Interval History   03/31/18    Treatment Team Notes:  Notes reviewed and/or discussed and report that Romi Prado is depressed/melancholic. Felt better after family visited and remained in [address] Denies SI, Hi and AVH. Patient interview: Romi Prado was interviewed by this writer today. Sleep is fair. Appetite is stable. Wants medications for back pain/spasm. States medications are helpful. Pt is medication compliant and denies medication side effects including TD, EPS, akathisia and mood derangements. Denies SI, HI & AVH. Objective     Visit Vitals    BP (!) 155/103 (BP 1 Location: Right arm, BP Patient Position: Sitting)    Pulse 80    Temp 98.7 °F (37.1 °C)    Resp 18    Ht 6' 2\" (1.88 m)    Wt 131.5 kg (290 lb)    SpO2 97%    BMI 37.23 kg/m2       Mental Status Examination     Appearance/Hygiene 29 y.o. AAM with fair hygiene. Abrasions on his forehead and left side of face due to striking his head on wall. Pt struck head on a wall prior to admission.     Behavior/Social Relatedness Appropriate  Relatedness is fair    Musculoskeletal Gait/Station: not assessed/appropriate - in bed  Tone (flaccid, cogwheeling, spastic): not assessed  Psychomotor (hyperkinetic, hypokinetic): psychomotor retardation - improving  Involuntary movements (tics, dyskinesias, akathisia, stereotypies): none   Speech                          Rate, rhythm, volume, fluency and articulation are appropriate   Mood                          resttricted   Affect                                                   resttricted   Thought Process Linear and goal directed     Vagueness, incoherence, circumstantiality, tangentiality, neologisms, perseveration, flight of ideas, or self-contradictory statements not present on assessment   Thought Content and Perceptual Disturbances Denies SI, HI and AVH. Not focused on death today. Denies delusions, ideas of reference, overvalued ideas, ruminations, obsession, compulsions, and phobias   Sensorium and Cognition              AOx4, attention intact, memory intact, language use appropriate, and fund of knowledge age appropriate   Insight              fair   Judgment fair         Assessment/Plan      Psychiatric Diagnoses:   MDD (major depressive disorder), recurrent severe, without psychosis ([facility] F33.2   Alcohol use disorder, moderate, dependence ([address] F10.20   Alcohol withdrawal, uncomplicated ([address] W93.971         Medical Diagnoses:        Patient Active Problem List   Diagnosis Code    Asthma J45.909    HTN (hypertension) I10    Flexor tendon laceration of finger with open wound S56.129A, S61.209A    Hypertriglyceridemia E78.1    Diabetes mellitus, insulin dependent (IDDM), controlled ([address] E11.9, Z79.4    Hyperlipidemia LDL goal < 70 E78.5    Hypomagnesemia E83.42    Vitamin D deficiency E55.9    MDD (major depressive disorder), recurrent severe, without psychosis ([address] F33.2    Alcohol use disorder, moderate, dependence ([address] F10.20    Alcohol withdrawal, uncomplicated ([address] K38.882      Psychosocial and contextual factors:                         1.  Alcohol use                        2.  FHx of suicide                        3.  Recent stressors     Level of impairment/disability: Moderate      Bill Gann is a 29 y.o. who is currently improving. Denies SI, HI and AVH. 1.  Depression   - continue citalopram 20mg po QHS   - Increase Abilify to 7mg po today and then to 7.5mg po Qam on 4-1-2018,   2. Reviewed instructions, risks, benefits and side effects of medications  3. Disposition/Discharge Date: self-care/home, 4-2/3-2018.       Zina Oakes MD DR. [facility]  Psychiatry

## 2018-03-31 NOTE — BH NOTES
0768 patient requested and received trazadone 100 mg PO for sleep. Went back to bed after receiving medication.

## 2018-03-31 NOTE — DIABETES MGMT
GLYCEMIC CONTROL PLAN OF CARE    Pt BG remains above taret, recommend increase lantus to 40 units two times daily, increase prandial lispro to 5 units with meals and continue with very insulin resistant scale lispro ACHS. Pt reports taking metformin at home, recommend begin 750 mg metformin er two times daily with meals. Pt reports good po intake and that he is making efforts while here to contact [facility] for DM medications. Recent Glucose Results:   Lab Results   Component Value Date/Time    GLUCPOC 239 (H) 03/31/2018 05:48 AM    GLUCPOC 389 (H) 03/30/2018 08:28 PM    GLUCPOC 304 (H) 03/30/2018 04:16 PM     TDD 3/30: 60 units lantus, 36 units lispro    Will continue to monitor inpatient for intervention.     Eliel Humphreys MS, [address] [place] [address], E  Pager: [phone]

## 2018-03-31 NOTE — PROGRESS NOTES
Problem: Suicide/Homicide (Adult/Pediatric)  Goal: *STG: Remains safe in hospital  Pt will contract for safety and remains safe daily. Outcome: Progressing Towards Goal  Remains safe  Goal: *STG/LTG: Complies with medication therapy  Pt will be medication compliant daily. Outcome: Progressing Towards Goal  Compliant   Patient quiet and in room most of the day. Medication compliant . Denies S.  I.

## 2018-04-01 LAB
GLUCOSE BLD STRIP.AUTO-MCNC: 197 MG/DL (ref 70–110)
GLUCOSE BLD STRIP.AUTO-MCNC: 234 MG/DL (ref 70–110)
GLUCOSE BLD STRIP.AUTO-MCNC: 272 MG/DL (ref 70–110)
GLUCOSE BLD STRIP.AUTO-MCNC: 388 MG/DL (ref 70–110)

## 2018-04-01 PROCEDURE — 74011636637 HC RX REV CODE- 636/637: Performed by: PSYCHIATRY & NEUROLOGY

## 2018-04-01 PROCEDURE — 65220000003 HC RM SEMIPRIVATE PSYCH

## 2018-04-01 PROCEDURE — 82962 GLUCOSE BLOOD TEST: CPT

## 2018-04-01 PROCEDURE — 74011250637 HC RX REV CODE- 250/637: Performed by: PSYCHIATRY & NEUROLOGY

## 2018-04-01 RX ORDER — METHOCARBAMOL 500 MG/1
750 TABLET, FILM COATED ORAL
Status: DISCONTINUED | OUTPATIENT
Start: 2018-04-01 | End: 2018-04-02 | Stop reason: HOSPADM

## 2018-04-01 RX ADMIN — INSULIN LISPRO 2 UNITS: 100 INJECTION, SOLUTION INTRAVENOUS; SUBCUTANEOUS at 07:45

## 2018-04-01 RX ADMIN — INSULIN GLARGINE 40 UNITS: 100 INJECTION, SOLUTION SUBCUTANEOUS at 17:15

## 2018-04-01 RX ADMIN — INSULIN LISPRO 6 UNITS: 100 INJECTION, SOLUTION INTRAVENOUS; SUBCUTANEOUS at 16:30

## 2018-04-01 RX ADMIN — Medication 2 CAPSULE: at 17:15

## 2018-04-01 RX ADMIN — METFORMIN HYDROCHLORIDE 750 MG: 750 TABLET, EXTENDED RELEASE ORAL at 17:15

## 2018-04-01 RX ADMIN — CITALOPRAM HYDROBROMIDE 20 MG: 20 TABLET ORAL at 17:20

## 2018-04-01 RX ADMIN — METHOCARBAMOL 500 MG: 500 TABLET ORAL at 08:13

## 2018-04-01 RX ADMIN — METHOCARBAMOL 750 MG: 500 TABLET ORAL at 16:15

## 2018-04-01 RX ADMIN — INSULIN LISPRO 5 UNITS: 100 INJECTION, SOLUTION INTRAVENOUS; SUBCUTANEOUS at 07:46

## 2018-04-01 RX ADMIN — ARIPIPRAZOLE 7.5 MG: 5 TABLET ORAL at 08:06

## 2018-04-01 RX ADMIN — INSULIN GLARGINE 40 UNITS: 100 INJECTION, SOLUTION SUBCUTANEOUS at 07:47

## 2018-04-01 RX ADMIN — INSULIN LISPRO 5 UNITS: 100 INJECTION, SOLUTION INTRAVENOUS; SUBCUTANEOUS at 11:47

## 2018-04-01 RX ADMIN — METHOCARBAMOL 750 MG: 500 TABLET ORAL at 23:18

## 2018-04-01 RX ADMIN — IBUPROFEN 600 MG: 600 TABLET ORAL at 16:15

## 2018-04-01 RX ADMIN — METFORMIN HYDROCHLORIDE 750 MG: 750 TABLET, EXTENDED RELEASE ORAL at 08:04

## 2018-04-01 RX ADMIN — INSULIN LISPRO 4 UNITS: 100 INJECTION, SOLUTION INTRAVENOUS; SUBCUTANEOUS at 11:46

## 2018-04-01 RX ADMIN — Medication 2 CAPSULE: at 11:52

## 2018-04-01 RX ADMIN — INSULIN LISPRO 10 UNITS: 100 INJECTION, SOLUTION INTRAVENOUS; SUBCUTANEOUS at 22:00

## 2018-04-01 RX ADMIN — INSULIN LISPRO 5 UNITS: 100 INJECTION, SOLUTION INTRAVENOUS; SUBCUTANEOUS at 17:15

## 2018-04-01 RX ADMIN — IBUPROFEN 600 MG: 600 TABLET ORAL at 23:20

## 2018-04-01 RX ADMIN — Medication 2 CAPSULE: at 08:04

## 2018-04-01 RX ADMIN — BACITRACIN ZINC, NEOMYCIN, POLYMYXIN B: 400; 3.5; 5 OINTMENT TOPICAL at 08:00

## 2018-04-01 NOTE — BH NOTES
\"I feel 100% better, my  medications are working and Im taking one day at a time\". Pt. has been polite and cooperative in the milieu interacting with staff and peers. Pt. denies SI/HI, AV/H. Pt contracts for safety on the unit agree to come to staff if feeling harm to self or others. Pt.denies any new medical/pain complaints. Pt. ate 100% of meals and took scheduled medications. Pt. did not have any visitors. Staff encouraged Pt. to  participate in treatment,  medication and group therapy. Pt agreed. Pt. remain free of falls and provided non skid socks. Staff will continue to monitor Pt. for behavior safety and location.

## 2018-04-01 NOTE — BH NOTES
GROUP THERAPY PROGRESS NOTE    Hans Peabody is participating in Recreational Therapy. Group time: [id number]    Personal goal for participation: fresh air break/games on the unit    Goal orientation: social    Group therapy participation: active    Therapeutic interventions reviewed and discussed: Staff encouraged Pt to get off the unit and go outside and get some fresh air, or play games on the unit with peers. Impression of participation:   Pt. chose to stay on the unit, play games with peers, color marci patterns and watch a movie.

## 2018-04-01 NOTE — PROGRESS NOTES
Problem: Suicide/Homicide (Adult/Pediatric)  Goal: *STG: Remains safe in hospital  Pt will contract for safety and remains safe daily. Outcome: Progressing Towards Goal  Denies suicidal thoughts. Goal: *STG/LTG: Complies with medication therapy  Pt will be medication compliant daily. Outcome: Progressing Towards Goal  Taking medications,    Problem: Diabetes Self-Management  Goal: *Monitoring blood glucose, interpreting and using results  Identify recommended blood glucose targets  and personal targets. Pt blood glucose will be monitored daily. Outcome: Progressing Towards Goal  bs ac and hs    Comments: Patient is brighter today, showered and out in day area. Social with peers. He is medication compliant. Taking Robaxin for lower back discomfort, states it is helping. Denies suicidal ideations.

## 2018-04-01 NOTE — BH NOTES
GROUP THERAPY PROGRESS NOTE    Romi Prado is not participating in [place]. Staff encouraged and pt declined.

## 2018-04-01 NOTE — PROGRESS NOTES
[address]  Inpatient Progress Note     Date of Service: 04/01/18  Hospital Day: 3     Subjective/Interval History   04/01/18    Treatment Team Notes:  Notes reviewed and/or discussed and report that Denis Sow states he is feeling 100% better. Denies SI, Hi and AVH. Patient interview: Denis Sow was interviewed by this writer today. Sleep is fair. Appetite is stable. Pt states his mood is much better. Continues to want family meeting with his wife. States medications are helpful. Pt is medication compliant and denies medication side effects including TD, EPS, akathisia and mood derangements. Denies SI, HI & AVH. C/o muscles spasms. Robaxin is helpful. Objective     Visit Vitals    /85 (BP 1 Location: Right arm, BP Patient Position: Sitting)    Pulse 82    Temp 97.2 °F (36.2 °C)    Resp 16    Ht 6' 2\" (1.88 m)    Wt 131.5 kg (290 lb)    SpO2 97%    BMI 37.23 kg/m2       Mental Status Examination     Appearance/Hygiene 29 y.o. AAM with fair hygiene. Well healing abrasions on his forehead and left side of face due to striking his head on wall. Pt struck head on a wall prior to admission. Behavior/Social Relatedness Appropriate  Relatedness is good   Musculoskeletal Gait/Station: appropriate/appropriate   Tone (flaccid, cogwheeling, spastic): not assessed  Psychomotor (hyperkinetic, hypokinetic): appropriate  Involuntary movements (tics, dyskinesias, akathisia, stereotypies): none   Speech                          Rate, rhythm, volume, fluency and articulation are appropriate   Mood                          \"I feel much better. \"   Affect                                                   euthymic   Thought Process Linear and goal directed     Vagueness, incoherence, circumstantiality, tangentiality, neologisms, perseveration, flight of ideas, or self-contradictory statements not present on assessment   Thought Content and Perceptual Disturbances Denies SI, HI and AVH. Not focused on death today. Denies delusions, ideas of reference, overvalued ideas, ruminations, obsession, compulsions, and phobias   Sensorium and Cognition              AOx4, attention intact, memory intact, language use appropriate, and fund of knowledge age appropriate   Insight              fair   Judgment fair         Assessment/Plan      Psychiatric Diagnoses:   MDD (major depressive disorder), recurrent severe, without psychosis ([facility] [address] F33.2   Alcohol use disorder, moderate, dependence ([address] F10.20   Alcohol withdrawal, uncomplicated ([address] Y29.823         Medical Diagnoses:        Patient Active Problem List   Diagnosis Code    Asthma J45.909    HTN (hypertension) I10    Flexor tendon laceration of finger with open wound S56.129A, S61.209A    Hypertriglyceridemia E78.1    Diabetes mellitus, insulin dependent (IDDM), controlled ([address] E11.9, Z79.4    Hyperlipidemia LDL goal < 70 E78.5    Hypomagnesemia E83.42    Vitamin D deficiency E55.9    MDD (major depressive disorder), recurrent severe, without psychosis ([address] F33.2    Alcohol use disorder, moderate, dependence ([address] F10.20    Alcohol withdrawal, uncomplicated ([address] N86.029      Psychosocial and contextual factors:                         1.  Alcohol use                        2.  FHx of suicide                        3.  Recent stressors     Level of impairment/disability: Moderate      Robert Diaz is a 29 y.o. who is currently improving. States he is feeling much better. Continues to want family meeting with his wife. Long discussion with pt about his elevated risks for suicide and to report to nearest ED with suicidal thoughts. Pt denies SI, HI and AVH. 1.  Depression   - continue citalopram 20mg po QHS   - continue Abilify to 7mg po today and then to 7.5mg po Qam on 4-1-2018/   2. Reviewed instructions, risks, benefits and side effects of medications  3.   Disposition/Discharge Date: self-care/home, 4-2/3-2018. 4.  Muscles spasms   - increase robaxin to 750mg po QID/prn    - continue PRN Ibuprofen  5.  SW, please schedule a family meeting for patient and make follow-up appt with a psychiatrist within 30 days of discharge. Please make a follow-up appt for psychotherapy at discharge as well.       Yesica Hernadez MD DR. [facility]  Psychiatry

## 2018-04-02 VITALS
RESPIRATION RATE: 16 BRPM | TEMPERATURE: 96.8 F | BODY MASS INDEX: 37.22 KG/M2 | SYSTOLIC BLOOD PRESSURE: 147 MMHG | DIASTOLIC BLOOD PRESSURE: 99 MMHG | HEIGHT: 74 IN | HEART RATE: 78 BPM | OXYGEN SATURATION: 97 % | WEIGHT: 290 LBS

## 2018-04-02 LAB
GLUCOSE BLD STRIP.AUTO-MCNC: 189 MG/DL (ref 70–110)
GLUCOSE BLD STRIP.AUTO-MCNC: 193 MG/DL (ref 70–110)

## 2018-04-02 PROCEDURE — 74011250637 HC RX REV CODE- 250/637: Performed by: PSYCHIATRY & NEUROLOGY

## 2018-04-02 PROCEDURE — 74011636637 HC RX REV CODE- 636/637: Performed by: PSYCHIATRY & NEUROLOGY

## 2018-04-02 PROCEDURE — 82962 GLUCOSE BLOOD TEST: CPT

## 2018-04-02 RX ORDER — INSULIN GLARGINE 100 [IU]/ML
40 INJECTION, SOLUTION SUBCUTANEOUS 2 TIMES DAILY
Qty: 1 VIAL | Refills: 0 | Status: SHIPPED | OUTPATIENT
Start: 2018-04-02

## 2018-04-02 RX ORDER — ARIPIPRAZOLE 5 MG/1
7.5 TABLET ORAL DAILY
Qty: 45 TAB | Refills: 0 | Status: SHIPPED | OUTPATIENT
Start: 2018-04-03

## 2018-04-02 RX ORDER — METHOCARBAMOL 750 MG/1
750 TABLET, FILM COATED ORAL
Qty: 30 TAB | Refills: 0 | Status: SHIPPED | OUTPATIENT
Start: 2018-04-02 | End: 2021-01-27

## 2018-04-02 RX ORDER — METFORMIN HYDROCHLORIDE 750 MG/1
750 TABLET, EXTENDED RELEASE ORAL
Qty: 60 TAB | Refills: 0 | Status: SHIPPED | OUTPATIENT
Start: 2018-04-02

## 2018-04-02 RX ORDER — TRAZODONE HYDROCHLORIDE 100 MG/1
100 TABLET ORAL
Qty: 15 TAB | Refills: 0 | Status: SHIPPED | OUTPATIENT
Start: 2018-04-02 | End: 2021-01-27

## 2018-04-02 RX ORDER — PREGABALIN 150 MG/1
150 CAPSULE ORAL 2 TIMES DAILY
Qty: 180 CAP | Refills: 3 | Status: SHIPPED | COMMUNITY
Start: 2018-04-02 | End: 2021-01-27

## 2018-04-02 RX ORDER — CITALOPRAM 20 MG/1
20 TABLET, FILM COATED ORAL EVERY EVENING
Qty: 30 TAB | Refills: 0 | Status: SHIPPED | OUTPATIENT
Start: 2018-04-02 | End: 2021-01-27

## 2018-04-02 RX ADMIN — INSULIN LISPRO 2 UNITS: 100 INJECTION, SOLUTION INTRAVENOUS; SUBCUTANEOUS at 08:39

## 2018-04-02 RX ADMIN — INSULIN LISPRO 5 UNITS: 100 INJECTION, SOLUTION INTRAVENOUS; SUBCUTANEOUS at 11:26

## 2018-04-02 RX ADMIN — Medication 2 CAPSULE: at 08:43

## 2018-04-02 RX ADMIN — METFORMIN HYDROCHLORIDE 750 MG: 750 TABLET, EXTENDED RELEASE ORAL at 08:42

## 2018-04-02 RX ADMIN — INSULIN LISPRO 2 UNITS: 100 INJECTION, SOLUTION INTRAVENOUS; SUBCUTANEOUS at 11:16

## 2018-04-02 RX ADMIN — Medication 2 CAPSULE: at 11:41

## 2018-04-02 RX ADMIN — ARIPIPRAZOLE 7.5 MG: 5 TABLET ORAL at 08:40

## 2018-04-02 RX ADMIN — INSULIN LISPRO 5 UNITS: 100 INJECTION, SOLUTION INTRAVENOUS; SUBCUTANEOUS at 08:48

## 2018-04-02 RX ADMIN — METHOCARBAMOL 750 MG: 500 TABLET ORAL at 08:42

## 2018-04-02 RX ADMIN — INSULIN GLARGINE 40 UNITS: 100 INJECTION, SOLUTION SUBCUTANEOUS at 08:46

## 2018-04-02 NOTE — BH NOTES
Patient is being discharged off unit with his belongings, discharge paperwork and prescriptions. Patient was getting a ride home from his wife after a family session.

## 2018-04-02 NOTE — DIABETES MGMT
GLYCEMIC CONTROL PLAN OF CARE    Pt BG above target. Pt reports his wife has been brining him in food and he has been eating it resulting in elevated BG. Pt asked for insulin to stay the same because he is doing better with eating in between meals and eating lower carbohydrate foods if he is eating between meals. Continue 40 units lantus two times daily, very insulin resistant scale lispro, 5 units prandial lispro, 750 metformin er two times daily with meals. Recent Glucose Results:   Lab Results   Component Value Date/Time    GLUCPOC 193 (H) 04/02/2018 11:15 AM    GLUCPOC 189 (H) 04/02/2018 07:25 AM    GLUCPOC 388 (H) 04/01/2018 08:33 PM     TDD 4/1: 80 units lantus, 37 units lispro    Will continue to monitor inpatient for intervention.     Barbara Vela MS, [address], [address]  Pager: [phone]

## 2018-04-02 NOTE — PROGRESS NOTES
HS blood glucose by fingerstick-388; patient given Humalog 10 unit subcutaneously per SSI coverage; Dr. Cade Mayes made aware; also, patient instructed on maintaining proper foods/diet; verbalized understanding.

## 2018-04-02 NOTE — DISCHARGE INSTRUCTIONS
BEHAVIORAL HEALTH NURSING DISCHARGE NOTE      Emergency Numbers    : [facility]: [phone]  Suicide Prevention Line: [phone] (TALK)      The following personal items collected during your admission are returned to you:   Dental Appliance:    Vision: Visual Aid: None  Hearing Aid:    Jewelry:    Clothing: Clothing: (P) Belt, Pants, Shirt, Socks, Undergarments  Other Valuables: Other Valuables: (P) Cigarettes, Wallet (shoes and two lighters)  Valuables sent to safe: The discharge information has been reviewed with the patient. The patient verbalized understanding. [email or web address] Activation    Thank you for requesting access to GetBack. Please follow the instructions below to securely access and download your online medical record. GetBack allows you to send messages to your doctor, view your test results, renew your prescriptions, schedule appointments, and more. How Do I Sign Up? In your internet browser, go to [email or web address]  Click on the First Time User? Click Here link in the Sign In box. You will be redirect to the New Member Sign Up page. Enter your GetBack Access Code exactly as it appears below. You will not need to use this code after youve completed the sign-up process. If you do not sign up before the expiration date, you must request a new code. GetBack Access Code: [email or web address]  Expires: 2018  9:39 PM (This is the date your GetBack access code will )    Enter the last four digits of your Social Security Number (xxxx) and Date of Birth (mm/dd/yyyy) as indicated and click Submit. You will be taken to the next sign-up page. Create a GetBack ID. This will be your GetBack login ID and cannot be changed, so think of one that is secure and easy to remember. Create a GetBack password. You can change your password at any time. Enter your Password Reset Question and Answer.  This can be used at a later time if you forget your password. Enter your e-mail address. You will receive e-mail notification when new information is available in [address] Click Sign Up. You can now view and download portions of your medical record. Click the HarQen link to download a portable copy of your medical information. Additional Information    If you have questions, please visit the Frequently Asked Questions section of the [employer] website at [email or web address] Remember, [employer] is NOT to be used for urgent needs. For medical emergencies, dial 911.     Patient armband removed and shredded

## 2018-04-02 NOTE — BH NOTES
GROUP THERAPY PROGRESS NOTE    Darshan Reyes is participating in [place]. Group time: 30 minutes    Personal goal for participation: Discussed discharge planning    Goal orientation: community    Group therapy participation: passive    Therapeutic interventions reviewed and discussed: Encouraged to participate in treatment and       Impression of participation: Pt did sit in group but offered no complaints or concerns.

## 2018-04-02 NOTE — PROGRESS NOTES
[facility]  Inpatient Psychiatry   Discharge Summary     Admit date: 3/28/2018    Discharge date and time: 4/2/2018  2:15 PM    Discharge Physician: Salli Schaumann, MD    DISCHARGE DIAGNOSES     Psychiatric Diagnoses:   MDD (major depressive disorder), recurrent severe, without psychosis ([address] F33.2   Alcohol use disorder, moderate, dependence ([address] F10.20   Alcohol withdrawal, uncomplicated ([address] W81.148           Medical Diagnoses:           Patient Active Problem List   Diagnosis Code    Asthma J45.909    HTN (hypertension) I10    Flexor tendon laceration of finger with open wound S56.129A, S61.209A    Hypertriglyceridemia E78.1    Diabetes mellitus, insulin dependent (IDDM), controlled ([address] E11.9, Z79.4    Hyperlipidemia LDL goal < 70 E78.5    Hypomagnesemia E83.42    Vitamin D deficiency E55.9    MDD (major depressive disorder), recurrent severe, without psychosis ([address] F33.2    Alcohol use disorder, moderate, dependence ([address] F10.20    Alcohol withdrawal, uncomplicated ([address] G53.229       Psychosocial and contextual factors:                         8.  Alcohol use                        9.  FHx of suicide                        1.  Recent stressors      Level of impairment/disability: [address] presented to the inpatient unit for inpatient psychiatric hospitalization after stating he was going to kill himself. He reports alcohol use to self-medicate. ED presentation ethanol was 218. Recent stressors include: finances, living arrangements, employment a . He lives with his mother but is  with 2 children. Pt has abrasions on his forehead. The pt was depressed on admission. He reports various life stressors causing worsening depression. The pt was started on Celexa 20mg po QHS for depression and Abilify which was increased to 7.5mg po Qam for antidepressant augmentation.   The pt tolerated both medications well and did not have any medication side effects. His mood improved and he felt his depression was better than admission. The pt's serum glucoses remained elevated. He told the DM nurse he was eating more junk food while hospitalized but would try to decrease that today. We discussed a obtained a PCP and it was recommended that the pt go to [facility] [address] to establish care as it is a walk-in clinic and they also accept patients for primary care. The diabetes nurse also spoke to the pt about establishing care with a primary care physician for better control of his condition. The diabetes nurse provided the pt with the contact information for a local primary care physician. The pt was not a behavioral concern while hospitalized. he attended groups, was medication compliant and behaviorally appropriate. Pt denied medication side effects including TD, EPS, akathisia and mood derangements. On  the pt was deemed psychiatrically stable and discharged to home. The pt denied SI, HI and AVH. DISPOSITION/FOLLOW-UP     Disposition: home    Follow-up Appointments:  4/3/18. .. APPOINTMENT MADE 9:30AM TODAY WITH CPA FOR PT ON 4/4/18 @ 2:15PM... CPA STAFF WILL CALL PT TO CONFIRM APPOINTMENT      MEDICATION CHANGES   Outpatient medications:  No current facility-administered medications on file prior to encounter. Current Outpatient Prescriptions on File Prior to Encounter   Medication Sig Dispense Refill    insulin detemir (LEVEMIR FLEXTOUCH) 100 unit/mL (3 mL) pen 20 Units by SubCUTAneous route two (2) times a day. Indications: TYPE 1 DIABETES MELLITUS 4 Package 3    rosuvastatin (CRESTOR) 10 mg tablet Take 1 tablet by mouth nightly. 90 tablet 3    omega-3 acid ethyl esters (LOVAZA) 1 gram capsule Take 2 capsules by mouth two (2) times a day. 360 capsule 3    magnesium oxide (MAG-OX) 400 mg tablet Take 1 tablet by mouth daily.  30 tablet 11    Cholecalciferol, Vitamin D3, (VITAMIN D3) 2,000 unit cap capsule Take 2,000 Units by mouth daily. 30 capsule 11    pregabalin (LYRICA) 150 mg capsule Take 1 capsule by mouth two (2) times a day. 180 capsule 3    fluticasone-salmeterol (ADVAIR) 100-50 mcg/dose diskus inhaler Take 1 Puff by inhalation every twelve (12) hours. 4 Inhaler 3    insulin aspart (NOVOLOG) 100 unit/mL flexpen Sliding scale insulin 3 Package 3    albuterol (PROVENTIL HFA) 90 mcg/actuation inhaler Take 2 Puffs by inhalation every four (4) hours as needed for Wheezing. 3 Inhaler 3         Medications discontinued during hospitalization:  Medications Discontinued During This Encounter   Medication Reason    insulin glargine (LANTUS) injection 20 Units     traZODone (DESYREL) tablet 50 mg     ARIPiprazole (ABILIFY) tablet 5 mg     insulin lispro (HUMALOG) injection 3 Units     insulin glargine (LANTUS) injection 30 Units     methocarbamol (ROBAXIN) tablet 500 mg          Discharged medication:  Current Discharge Medication List          Instructions, risks (black box warning), benefits and side effects (EPS, TD, NMS) were discussed in detail prior to discharge. Patient denied any adverse medication side effects prior to discharge. LABS/IMAGING DURING ADMISSION     Results for orders placed or performed during the hospital encounter of 03/28/18   CBC WITH AUTOMATED DIFF   Result Value Ref Range    WBC 6.7 4.6 - 13.2 K/uL    RBC 4.87 4.70 - 5.50 M/uL    HGB 15.9 13.0 - 16.0 g/dL    HCT 44.2 36.0 - 48.0 %    MCV 90.8 74.0 - 97.0 FL    MCH 32.6 24.0 - 34.0 PG    MCHC 36.0 31.0 - 37.0 g/dL    RDW 13.7 11.6 - 14.5 %    PLATELET 149 878 - 535 K/uL    MPV 10.7 9.2 - 11.8 FL    NEUTROPHILS 62 40 - 73 %    LYMPHOCYTES 23 21 - 52 %    MONOCYTES 15 (H) 3 - 10 %    EOSINOPHILS 0 0 - 5 %    BASOPHILS 0 0 - 2 %    ABS. NEUTROPHILS 4.2 1.8 - 8.0 K/UL    ABS. LYMPHOCYTES 1.5 0.9 - 3.6 K/UL    ABS. MONOCYTES 1.0 0.05 - 1.2 K/UL    ABS. EOSINOPHILS 0.0 0.0 - 0.4 K/UL    ABS.  BASOPHILS 0.0 0.0 - 0.1 K/UL    DF AUTOMATED     METABOLIC PANEL, COMPREHENSIVE   Result Value Ref Range    Sodium 131 (L) 136 - 145 mmol/L    Potassium 3.5 3.5 - 5.5 mmol/L    Chloride 91 (L) 100 - 108 mmol/L    CO2 16 (L) 21 - 32 mmol/L    Anion gap 24 (H) 3.0 - 18 mmol/L    Glucose 309 (H) 74 - 99 mg/dL    BUN 12 7.0 - 18 MG/DL    Creatinine 0.92 0.6 - 1.3 MG/DL    BUN/Creatinine ratio 13 12 - 20      GFR est AA >60 >60 ml/min/1.73m2    GFR est non-AA >60 >60 ml/min/1.73m2    Calcium 9.8 8.5 - 10.1 MG/DL    Bilirubin, total 0.8 0.2 - 1.0 MG/DL    ALT (SGPT) 140 (H) 16 - 61 U/L    AST (SGOT) 107 (H) 15 - 37 U/L    Alk.  phosphatase 95 45 - 117 U/L    Protein, total 8.7 (H) 6.4 - 8.2 g/dL    Albumin 4.6 3.4 - 5.0 g/dL    Globulin 4.1 (H) 2.0 - 4.0 g/dL    A-G Ratio 1.1 0.8 - 1.7     URINALYSIS W/ RFLX MICROSCOPIC   Result Value Ref Range    Color YELLOW      Appearance CLEAR      Specific gravity >1.030 (H) 1.005 - 1.030    pH (UA) 5.0 5.0 - 8.0      Protein 30 (A) NEG mg/dL    Glucose >1000 (A) NEG mg/dL    Ketone 80 (A) NEG mg/dL    Bilirubin NEGATIVE  NEG      Blood NEGATIVE  NEG      Urobilinogen 0.2 0.2 - 1.0 EU/dL    Nitrites NEGATIVE  NEG      Leukocyte Esterase NEGATIVE  NEG     DRUG SCREEN, URINE   Result Value Ref Range    BENZODIAZEPINES NEGATIVE  NEG      BARBITURATES NEGATIVE  NEG      THC (TH-CANNABINOL) NEGATIVE  NEG      OPIATES NEGATIVE  NEG      PCP(PHENCYCLIDINE) NEGATIVE  NEG      COCAINE NEGATIVE  NEG      AMPHETAMINES NEGATIVE  NEG      METHADONE NEGATIVE  NEG      HDSCOM (NOTE)    ETHYL ALCOHOL   Result Value Ref Range    ALCOHOL(ETHYL),SERUM 218 (H) 0 - 3 MG/DL   URINE MICROSCOPIC ONLY   Result Value Ref Range    WBC NEGATIVE  0 - 4 /hpf    RBC NEGATIVE  0 - 5 /hpf    Epithelial cells 1+ 0 - 5 /lpf    Bacteria NEGATIVE  NEG /hpf   TSH 3RD GENERATION   Result Value Ref Range    TSH 0.99 0.36 - 3.74 uIU/mL   HEMOGLOBIN A1C WITH EAG   Result Value Ref Range    Hemoglobin A1c 10.0 (H) 4.2 - 5.6 %    Est. average glucose 240 mg/dL   LIPID PANEL   Result Value Ref Range    LIPID PROFILE          Cholesterol, total 363 (H) <200 MG/DL    Triglyceride 356 (H) <150 MG/DL    HDL Cholesterol 45 40 - 60 MG/DL    LDL, calculated 246.8 (H) 0 - 100 MG/DL    VLDL, calculated 71.2 MG/DL    CHOL/HDL Ratio 8.1 (H) 0 - 5.0     GLUCOSE, POC   Result Value Ref Range    Glucose (POC) 327 (H) 70 - 110 mg/dL   GLUCOSE, POC   Result Value Ref Range    Glucose (POC) 279 (H) 70 - 110 mg/dL   GLUCOSE, POC   Result Value Ref Range    Glucose (POC) 332 (H) 70 - 110 mg/dL   GLUCOSE, POC   Result Value Ref Range    Glucose (POC) 252 (H) 70 - 110 mg/dL   GLUCOSE, POC   Result Value Ref Range    Glucose (POC) 280 (H) 70 - 110 mg/dL   GLUCOSE, POC   Result Value Ref Range    Glucose (POC) 304 (H) 70 - 110 mg/dL   GLUCOSE, POC   Result Value Ref Range    Glucose (POC) 389 (H) 70 - 110 mg/dL   GLUCOSE, POC   Result Value Ref Range    Glucose (POC) 239 (H) 70 - 110 mg/dL   GLUCOSE, POC   Result Value Ref Range    Glucose (POC) 275 (H) 70 - 110 mg/dL   GLUCOSE, POC   Result Value Ref Range    Glucose (POC) 334 (H) 70 - 110 mg/dL   GLUCOSE, POC   Result Value Ref Range    Glucose (POC) 321 (H) 70 - 110 mg/dL   GLUCOSE, POC   Result Value Ref Range    Glucose (POC) 197 (H) 70 - 110 mg/dL   GLUCOSE, POC   Result Value Ref Range    Glucose (POC) 234 (H) 70 - 110 mg/dL   GLUCOSE, POC   Result Value Ref Range    Glucose (POC) 272 (H) 70 - 110 mg/dL   GLUCOSE, POC   Result Value Ref Range    Glucose (POC) 388 (H) 70 - 110 mg/dL   GLUCOSE, POC   Result Value Ref Range    Glucose (POC) 189 (H) 70 - 110 mg/dL   EKG, 12 LEAD, INITIAL   Result Value Ref Range    Ventricular Rate 93 BPM    Atrial Rate 93 BPM    P-R Interval 170 ms    QRS Duration 100 ms    Q-T Interval 366 ms    QTC Calculation (Bezet) 455 ms    Calculated P Axis 58 degrees    Calculated R Axis -28 degrees    Calculated T Axis 32 degrees    Diagnosis       Normal sinus rhythm  Possible Anterior infarct , age undetermined  Abnormal ECG  No previous ECGs available  Confirmed by Nelson Henson MD, Caleb Light ([id number]) on 3/29/2018 4:45:31 PM          DISCHARGE MENTAL STATUS EVALUATION     Appearance/Hygiene 29 y.o. AAM with fair hygiene.    Well healing abrasions on his forehead and left side of face due to striking his head on wall. Pt struck head on a wall prior to admission. Behavior/Social Relatedness Appropriate  Relatedness is good   Musculoskeletal Gait/Station: appropriate/appropriate   Tone (flaccid, cogwheeling, spastic): not assessed  Psychomotor (hyperkinetic, hypokinetic): appropriate  Involuntary movements (tics, dyskinesias, akathisia, stereotypies): none   Speech                          Rate, rhythm, volume, fluency and articulation are appropriate   Mood                          \"I feel much better. \"   Affect                                                   euthymic   Thought Process Linear and goal directed      Vagueness, incoherence, circumstantiality, tangentiality, neologisms, perseveration, flight of ideas, or self-contradictory statements not present on assessment   Thought Content and Perceptual Disturbances Denies SI, HI and AVH. Not focused on death today.      Denies delusions, ideas of reference, overvalued ideas, ruminations, obsession, compulsions, and phobias   Sensorium and Cognition              AOx4, attention intact, memory intact, language use appropriate, and fund of knowledge age appropriate   Insight              fair   Judgment fair         SUICIDE RISK ASSESSMENT     [] Admission  [x] Discharge     Key Factors:   Current admission precipitated by suicide attempt?   []  Yes     2    [x]  No     1     Suicide Attempt History  [] Past attempts of high lethality    2 []  Past attempts of low lethality    1 [x]  No previous attempts       0   Suicidal Ideation []  Constant suicidal thoughts      2 []  Intermittent or fleeting suicidal  thoughts  1 [x]  Denies current suicidal thoughts    0 Suicide Plan   []  Has plan with actual OR potential access to planned method    2 []  Has plan without access to planned method      1 [x]  No plan            0   Plan Lethality []  Highly lethal plan (Carbon monoxide, gun, hanging, jumping)    2 []  Moderate lethality of plan          1 [x]  Low lethality of plan (biting, head banging, superficial scratching, pillow over face)  0   Safety Plan Agreement  []  Unwilling OR unable to agree due to impaired reality testing   2   []  Patient is ambivalent and/or guarded      1 [x]  Reliably agrees        0   Current Morbid Thoughts (reunion fantasies, preoccupations with death) []  Constantly     2     []  Frequently    1 [x]  Rarely    0   Elopement Risk  []  High risk     2 []  Moderate risk    1 [x]   Low risk    0   Symptoms    []  Hopeless  []  Helpless  []  Anhedonia   []  Guilt/shame  []  Anger/rage  []  Anxiety  []  Insomnia   []  Agitation   []  Impulsivity  []  5-6 symptoms present    2 []  3-4 symptoms present    1  [x]  0-2 symptoms present    0     Scoring Key:  10 or higher = Imminent Risk (consider 1:1)  4 - 9 = Moderate Risk (consider q 15 minute observation)Attended alcohol, tobacco, prescription and other drug psychoeducation group.   0 - 3 = Low Risk (consider q 30 minute observation)    Total Score: 1  ------------------------------------------------------------------------------------------------------------------  PLEASE ADDRESS THE FOLLOWING 5 ISSUES     Physician's Subjective Appraisal of Risk (check one):  []  Patient replies not trustworthy: several non-verbal cues. []  Patient replies questionable: trustworthy: at least 1 non-verbal cue. [x]  Patient replies appear trustworthy. Family History of Suicide?    [x]  Yes  []  No    Protective measures (select all that apply):  [x]  Successful past responses to stress  [x]  Spiritual/Amish beliefs  [x]  Capacity for reality testing  [x]  Positive therapeutic relationships  [x]  Social supports/connections  [x]  Positive coping skills  [x]  Frustration tolerance/optimism  []  Children or pets in the home  [x]  Sense of responsibility to family  [x]  Agrees to treatment plan and follow up    Others (list):    High Risk Diagnoses (select all that apply):  [x]  Depression/Bipolar Disorder  []  Dual Diagnosis  []  Cardiovascular Disease  []  Schizophrenia  []  Chronic Pain  []  Epilepsy  []  Cancer  []  Personality Disorder  []  HIV/AIDS  []  Multiple Sclerosis    Dangerousness Assessment (Suicide, homicide, property destruction. ..)    Risk Factors reviewed and risk assessed to be:  [] low  [] low-moderate  [x] moderate   [] moderate-high  [] high     Protection factors reviewed and risk assessed to be:  [x] low  [] low-moderate  [] moderate   [] moderate-high  [] high     Response to treatment and risk assessed to be:  [x] low  [] low-moderate  [] moderate   [] moderate-high  [] high     Support reviewed and risk assessed to be:  [x] low  [] low-moderate  [] moderate   [] moderate-high  [] high     Acceptance of Discharge and outpatient treatment reviewed and risk assessed to be:    [x] low  [] low-moderate  [] moderate   [] moderate-high  [] high   Overall risk assessed to be:  [x] low  [x] low-moderate  [] moderate   [] moderate-high  [] high     Completion of discharge was greater than 30 minutes. Over 50% of today's discharge was geared towards counseling and coordination of care.           Medhat Cook MD  Psychiatry  [facility]

## 2018-04-02 NOTE — BSMART NOTE
SW Contact:  When in process of tx team  recommended Family session with pt. . D/c plan reviewed with pt with emphasis on the the role \"consistent\" attendance & participation in outpt tx would serve in giving relief, direction & support & assist him in putting energy into solutions instead of predicting negative outcome. .. Meeting held with his wife, as we reviewed strategies to improve communication. As well as a couple things each would like partner to change. .. Safety plan reviewed. . Pt will do outpt tx at [facility].  (see fyi)

## 2018-04-02 NOTE — BSMART NOTE
Ruba Ortega is participating in RN Group Note. Group time: 30 minutes    Personal goal for participation: Staying Healthy    Goal orientation: Treatment Compliance/Unit Guidelines    Group therapy participation: active    Therapeutic interventions reviewed and discussed: Eating well, personal hygiene, medication compliance, positive coping, reviewed and discussed unit guidelines. Impression of participation: Fully participated in group.

## 2018-04-02 NOTE — BSMART NOTE
SOCIAL WORK GROUP THERAPY PROGRESS NOTE    Group Time:  11am    Group Topic:  Coping Skills    C D Issues    Group Participation:    Actively participated in group discussion. Read parts of handout. \"Seven Steps\" for taking responsibility for our Happiness was reviewed including commitment to change, self-care, setting limits, goal setting & letting go. Admits needs to focus more on self & learn to say \"no\". Reviewed strategies to keep a \"Journal\" for moods, cognitions, behavior & outcome.

## 2018-04-02 NOTE — DIABETES MGMT
Diabetes Patient/Family Education Record   Pt is  and has difficulty standing on his feet for extended periods of times due to neuropathy. Encouraged pt to get PCP and job as him and his family just moved from [place]. Factors That  May Influence Patients Ability  to Learn or  Comply with Recommendations   []   Language barrier    []   Cultural needs   []   Motivation    [x]   Cognitive limitation    []   Physical   [x]   Education    []   Physiological factors   []   Hearing/vision/speaking impairment   []   Yazidism beliefs    []   Financial factors   []  Other:   []  No factors identified at this time.      Person Instructed:   [x]   Patient   []   Family   []  Other     Preference for Learning:   [x]   Verbal   [x]   Written   []  Demonstration     Level of Comprehension & Competence:    []  Good                                      [x] Fair                                     []  Poor                             [x]  Needs Reinforcement   [x]  Teachback completed    Education Component:   [x]  Medication management,     [x]  Nutritional management    []  Exercise   []  Signs, symptoms, and treatment of hyperglycemia and hypoglycemia   [] Prevention, recognition and treatment of hyperglycemia and hypoglycemia   [x]  Importance of blood glucose monitoring and how to obtain a blood glucose meter    []  Instruction on use of the blood glucose meter   [x]  Discuss the importance of HbA1C monitoring    []  Sick day guidelines   []  Proper use and disposal of lancets, needles, syringes or insulin pens (if appropriate)   [x]  Potential long-term complications (retinopathy, kidney disease, neuropathy, foot care)   [x] Information about whom to contact in case of emergency or for more information    [x]  Goal:  Patient/family will demonstrate understanding of Diabetes Self Management Skills by: (date) _4/9/18______  Plan for post-discharge education or self-management support:    [x] Outpatient class schedule provided            [] Patient Declined    [] Scheduled for outpatient classes (date) _______     Paula Payan, MS, [address] [place] [address], CDE  Pager: [phone]

## 2018-04-02 NOTE — BH NOTES
\"I feel much better\". Pt has been very talkative in the milieu with staff and peers. Pt participated in [place] and nursing group. Pt. denies SI/HI, AV/H. Pt contracts for safety on the unit agree to come to staff if feeling harm to self or others. Pt.denies any new medical/pain complaints. Pt. ate 100% of meals and took scheduled medications. Pt. did not have any visitors. Staff encouraged Pt. to  participate in treatment,  medication and group therapy. Pt agreed. Pt. remain free of falls and provided non skid socks. Staff will continue to monitor Pt. for behavior safety and location.

## 2018-04-03 NOTE — DISCHARGE SUMMARY
[facility]  Inpatient Psychiatry   Discharge Summary      Admit date: 3/28/2018     Discharge date and time: 4/2/2018  2:15 PM     Discharge Physician: Antione Shook MD     DISCHARGE DIAGNOSES      Psychiatric Diagnoses:   MDD (major depressive disorder), recurrent severe, without psychosis ([address] F33.2   Alcohol use disorder, moderate, dependence ([address] F10.20   Alcohol withdrawal, uncomplicated ([address] Y44.972           Medical Diagnoses:              Patient Active Problem List   Diagnosis Code    Asthma J45.909    HTN (hypertension) I10    Flexor tendon laceration of finger with open wound S56.129A, S61.209A    Hypertriglyceridemia E78.1    Diabetes mellitus, insulin dependent (IDDM), controlled ([address] E11.9, Z79.4    Hyperlipidemia LDL goal < 70 E78.5    Hypomagnesemia E83.42    Vitamin D deficiency E55.9    MDD (major depressive disorder), recurrent severe, without psychosis ([address] F33.2    Alcohol use disorder, moderate, dependence ([address] F10.20    Alcohol withdrawal, uncomplicated ([address] U01.132       Psychosocial and contextual factors:                         9.  Alcohol use                        5.  FHx of suicide                        3.  Recent stressors      Level of impairment/disability: Moderate        HOSPITAL COURSE      Lluvia Lilly presented to the inpatient unit for inpatient psychiatric hospitalization after stating he was going to kill himself. [facility] reports alcohol use to self-medicate.  ED presentation ethanol was 218.  Recent stressors include: finances, living arrangements, employment a . [facility] lives with his mother but is  with 2 children.  Pt has abrasions on his forehead.      The pt was depressed on admission. He reports various life stressors causing worsening depression. The pt was started on Celexa 20mg po QHS for depression and Abilify which was increased to 7.5mg po Qam for antidepressant augmentation.   The pt tolerated both medications well and did not have any medication side effects. His mood improved and he felt his depression was better than admission. The pt's serum glucoses remained elevated. He told the DM nurse he was eating more junk food while hospitalized but would try to decrease that today. We discussed a obtained a PCP and it was recommended that the pt go to [facility] to establish care as it is a walk-in clinic and they also accept patients for primary care. The diabetes nurse also spoke to the pt about establishing care with a primary care physician for better control of his condition. The diabetes nurse provided the pt with the contact information for a local primary care physician.       The pt was not a behavioral concern while hospitalized. he attended groups, was medication compliant and behaviorally appropriate. Pt denied medication side effects including TD, EPS, akathisia and mood derangements.       On  the pt was deemed psychiatrically stable and discharged to home. The pt denied SI, HI and AVH.         DISPOSITION/FOLLOW-UP      Disposition: home     Follow-up Appointments:  4/3/18. .. APPOINTMENT MADE 9:30AM TODAY WITH [facility]PA FOR PT ON 4/4/18 @ 2:15PM... [facility] STAFF WILL CALL PT TO CONFIRM APPOINTMENT        MEDICATION CHANGES   Outpatient medications:  No current facility-administered medications on file prior to encounter.              Current Outpatient Prescriptions on File Prior to Encounter   Medication Sig Dispense Refill    insulin detemir (LEVEMIR FLEXTOUCH) 100 unit/mL (3 mL) pen 20 Units by SubCUTAneous route two (2) times a day. Indications: TYPE 1 DIABETES MELLITUS 4 Package 3    rosuvastatin (CRESTOR) 10 mg tablet Take 1 tablet by mouth nightly. 90 tablet 3    omega-3 acid ethyl esters (LOVAZA) 1 gram capsule Take 2 capsules by mouth two (2) times a day. 360 capsule 3    magnesium oxide (MAG-OX) 400 mg tablet Take 1 tablet by mouth daily.  30 tablet 11    Cholecalciferol, Vitamin D3, (VITAMIN D3) 2,000 unit cap capsule Take 2,000 Units by mouth daily. 30 capsule 11    pregabalin (LYRICA) 150 mg capsule Take 1 capsule by mouth two (2) times a day. 180 capsule 3    fluticasone-salmeterol (ADVAIR) 100-50 mcg/dose diskus inhaler Take 1 Puff by inhalation every twelve (12) hours. 4 Inhaler 3    insulin aspart (NOVOLOG) 100 unit/mL flexpen Sliding scale insulin 3 Package 3    albuterol (PROVENTIL HFA) 90 mcg/actuation inhaler Take 2 Puffs by inhalation every four (4) hours as needed for Wheezing. 3 Inhaler 3            Medications discontinued during hospitalization:       Medications Discontinued During This Encounter   Medication Reason    insulin glargine (LANTUS) injection 20 Units      traZODone (DESYREL) tablet 50 mg      ARIPiprazole (ABILIFY) tablet 5 mg      insulin lispro (HUMALOG) injection 3 Units      insulin glargine (LANTUS) injection 30 Units      methocarbamol (ROBAXIN) tablet 500 mg              Discharged medication:  Current Discharge Medication List             Instructions, risks (black box warning), benefits and side effects (EPS, TD, NMS) were discussed in detail prior to discharge. Patient denied any adverse medication side effects prior to discharge.         LABS/IMAGING DURING ADMISSION             Results for orders placed or performed during the hospital encounter of 03/28/18   CBC WITH AUTOMATED DIFF   Result Value Ref Range     WBC 6.7 4.6 - 13.2 K/uL     RBC 4.87 4.70 - 5.50 M/uL     HGB 15.9 13.0 - 16.0 g/dL     HCT 44.2 36.0 - 48.0 %     MCV 90.8 74.0 - 97.0 FL     MCH 32.6 24.0 - 34.0 PG     MCHC 36.0 31.0 - 37.0 g/dL     RDW 13.7 11.6 - 14.5 %     PLATELET 819 947 - 833 K/uL     MPV 10.7 9.2 - 11.8 FL     NEUTROPHILS 62 40 - 73 %     LYMPHOCYTES 23 21 - 52 %     MONOCYTES 15 (H) 3 - 10 %     EOSINOPHILS 0 0 - 5 %     BASOPHILS 0 0 - 2 %     ABS. NEUTROPHILS 4.2 1.8 - 8.0 K/UL     ABS. LYMPHOCYTES 1.5 0.9 - 3.6 K/UL     ABS.  MONOCYTES 1.0 0.05 - 1.2 K/UL   ABS. EOSINOPHILS 0.0 0.0 - 0.4 K/UL     ABS. BASOPHILS 0.0 0.0 - 0.1 K/UL     DF AUTOMATED      METABOLIC PANEL, COMPREHENSIVE   Result Value Ref Range     Sodium 131 (L) 136 - 145 mmol/L     Potassium 3.5 3.5 - 5.5 mmol/L     Chloride 91 (L) 100 - 108 mmol/L     CO2 16 (L) 21 - 32 mmol/L     Anion gap 24 (H) 3.0 - 18 mmol/L     Glucose 309 (H) 74 - 99 mg/dL     BUN 12 7.0 - 18 MG/DL     Creatinine 0.92 0.6 - 1.3 MG/DL     BUN/Creatinine ratio 13 12 - 20       GFR est AA >60 >60 ml/min/1.73m2     GFR est non-AA >60 >60 ml/min/1.73m2     Calcium 9.8 8.5 - 10.1 MG/DL     Bilirubin, total 0.8 0.2 - 1.0 MG/DL     ALT (SGPT) 140 (H) 16 - 61 U/L     AST (SGOT) 107 (H) 15 - 37 U/L     Alk.  phosphatase 95 45 - 117 U/L     Protein, total 8.7 (H) 6.4 - 8.2 g/dL     Albumin 4.6 3.4 - 5.0 g/dL     Globulin 4.1 (H) 2.0 - 4.0 g/dL     A-G Ratio 1.1 0.8 - 1.7     URINALYSIS W/ RFLX MICROSCOPIC   Result Value Ref Range     Color YELLOW        Appearance CLEAR        Specific gravity >1.030 (H) 1.005 - 1.030     pH (UA) 5.0 5.0 - 8.0       Protein 30 (A) NEG mg/dL     Glucose >1000 (A) NEG mg/dL     Ketone 80 (A) NEG mg/dL     Bilirubin NEGATIVE  NEG       Blood NEGATIVE  NEG       Urobilinogen 0.2 0.2 - 1.0 EU/dL     Nitrites NEGATIVE  NEG       Leukocyte Esterase NEGATIVE  NEG     DRUG SCREEN, URINE   Result Value Ref Range     BENZODIAZEPINES NEGATIVE  NEG       BARBITURATES NEGATIVE  NEG       THC (TH-CANNABINOL) NEGATIVE  NEG       OPIATES NEGATIVE  NEG       PCP(PHENCYCLIDINE) NEGATIVE  NEG       COCAINE NEGATIVE  NEG       AMPHETAMINES NEGATIVE  NEG       METHADONE NEGATIVE  NEG       HDSCOM (NOTE)     ETHYL ALCOHOL   Result Value Ref Range     ALCOHOL(ETHYL),SERUM 218 (H) 0 - 3 MG/DL   URINE MICROSCOPIC ONLY   Result Value Ref Range     WBC NEGATIVE  0 - 4 /hpf     RBC NEGATIVE  0 - 5 /hpf     Epithelial cells 1+ 0 - 5 /lpf     Bacteria NEGATIVE  NEG /hpf   TSH 3RD GENERATION   Result Value Ref Range     TSH 0.99 0.36 - 3. 74 uIU/mL   HEMOGLOBIN A1C WITH EAG   Result Value Ref Range     Hemoglobin A1c 10.0 (H) 4.2 - 5.6 %     Est. average glucose 240 mg/dL   LIPID PANEL   Result Value Ref Range     LIPID PROFILE           Cholesterol, total 363 (H) <200 MG/DL     Triglyceride 356 (H) <150 MG/DL     HDL Cholesterol 45 40 - 60 MG/DL     LDL, calculated 246.8 (H) 0 - 100 MG/DL     VLDL, calculated 71.2 MG/DL     CHOL/HDL Ratio 8.1 (H) 0 - 5.0     GLUCOSE, POC   Result Value Ref Range     Glucose (POC) 327 (H) 70 - 110 mg/dL   GLUCOSE, POC   Result Value Ref Range     Glucose (POC) 279 (H) 70 - 110 mg/dL   GLUCOSE, POC   Result Value Ref Range     Glucose (POC) 332 (H) 70 - 110 mg/dL   GLUCOSE, POC   Result Value Ref Range     Glucose (POC) 252 (H) 70 - 110 mg/dL   GLUCOSE, POC   Result Value Ref Range     Glucose (POC) 280 (H) 70 - 110 mg/dL   GLUCOSE, POC   Result Value Ref Range     Glucose (POC) 304 (H) 70 - 110 mg/dL   GLUCOSE, POC   Result Value Ref Range     Glucose (POC) 389 (H) 70 - 110 mg/dL   GLUCOSE, POC   Result Value Ref Range     Glucose (POC) 239 (H) 70 - 110 mg/dL   GLUCOSE, POC   Result Value Ref Range     Glucose (POC) 275 (H) 70 - 110 mg/dL   GLUCOSE, POC   Result Value Ref Range     Glucose (POC) 334 (H) 70 - 110 mg/dL   GLUCOSE, POC   Result Value Ref Range     Glucose (POC) 321 (H) 70 - 110 mg/dL   GLUCOSE, POC   Result Value Ref Range     Glucose (POC) 197 (H) 70 - 110 mg/dL   GLUCOSE, POC   Result Value Ref Range     Glucose (POC) 234 (H) 70 - 110 mg/dL   GLUCOSE, POC   Result Value Ref Range     Glucose (POC) 272 (H) 70 - 110 mg/dL   GLUCOSE, POC   Result Value Ref Range     Glucose (POC) 388 (H) 70 - 110 mg/dL   GLUCOSE, POC   Result Value Ref Range     Glucose (POC) 189 (H) 70 - 110 mg/dL   EKG, 12 LEAD, INITIAL   Result Value Ref Range     Ventricular Rate 93 BPM     Atrial Rate 93 BPM     P-R Interval 170 ms     QRS Duration 100 ms     Q-T Interval 366 ms     QTC Calculation (Bezet) 455 ms     Calculated P Axis 58 degrees     Calculated R Axis -28 degrees     Calculated T Axis 32 degrees     Diagnosis           Normal sinus rhythm  Possible Anterior infarct , age undetermined  Abnormal ECG  No previous ECGs available  Confirmed by Federico Medina MD, Yevgeniy Pereira ([id number]) on 3/29/2018 4:45:31 PM         DISCHARGE MENTAL STATUS EVALUATION      Appearance/Hygiene 32 y.o. AAM with fair hygiene.    Well healing abrasions on his forehead and left side of face due to striking his head on wall.  Pt struck head on a wall prior to admission. Behavior/Social Relatedness Appropriate  Relatedness is good   Musculoskeletal Gait/Station: appropriate/appropriate   Tone (flaccid, cogwheeling, spastic): not assessed  Psychomotor (hyperkinetic, hypokinetic): appropriate  Involuntary movements (tics, dyskinesias, akathisia, stereotypies): none   Speech                          Rate, rhythm, volume, fluency and articulation are appropriate   Mood                          \"I feel much better. \"   Affect                                                   euthymic   Thought Process Linear and goal directed      Vagueness, incoherence, circumstantiality, tangentiality, neologisms, perseveration, flight of ideas, or self-contradictory statements not present on assessment   Thought Content and Perceptual Disturbances Denies SI, HI and AVH.     Not focused on death today.   Abraham.[email or web address]  Denies delusions, ideas of reference, overvalued ideas, ruminations, obsession, compulsions, and phobias   Sensorium and Cognition              AOx4, attention intact, memory intact, language use appropriate, and fund of knowledge age appropriate   Insight              fair   Judgment fair          SUICIDE RISK ASSESSMENT      [] Admission                                             [x] Discharge      Key Factors:   Current admission precipitated by suicide attempt?   []  Yes      2    [x]  No      1   Suicide Attempt History  [] Past attempts of high lethality     2 []  Past attempts of low lethality     1 [x]  No previous attempts         0   Suicidal Ideation []  Constant suicidal thoughts        2 []  Intermittent or fleeting suicidal  thoughts  1 [x]  Denies current suicidal thoughts     0   Suicide Plan   []  Has plan with actual OR potential access to planned method     2 []  Has plan without access to planned method        1 [x]  No plan                 0   Plan Lethality []  Highly lethal plan (Carbon monoxide, gun, hanging, jumping)     2 []  Moderate lethality of plan              1 [x]  Low lethality of plan (biting, head banging, superficial scratching, pillow over face)  0   Safety Plan Agreement  []  Unwilling OR unable to agree due to impaired reality testing   2   []  Patient is ambivalent and/or guarded        1 [x]  Reliably agrees           0   Current Morbid Thoughts (reunion fantasies, preoccupations with death) []  Constantly      2    []  Frequently     1 [x]  Rarely     0   Elopement Risk  []  High risk      2 []  Moderate risk     1 [x]   Low risk     0   Symptoms    []  Hopeless  []  Helpless  []  Anhedonia   []  Guilt/shame  []  Anger/rage  []  Anxiety  []  Insomnia   []  Agitation   []  Impulsivity  []  5-6 symptoms present     2 []  3-4 symptoms present     1  [x]  0-2 symptoms present     0      Scoring Key:  10 or higher = Imminent Risk (consider 1:1)  4 - 9 = Moderate Risk (consider q 15 minute observation)Attended alcohol, tobacco, prescription and other drug psychoeducation group.   0 - 3 = Low Risk (consider q 30 minute observation)     Total Score: 1  ------------------------------------------------------------------------------------------------------------------  PLEASE ADDRESS THE FOLLOWING 5 ISSUES      Physician's Subjective Appraisal of Risk (check one):  []  Patient replies not trustworthy: several non-verbal cues. []  Patient replies questionable: trustworthy: at least 1 non-verbal cue.   [x]  Patient replies appear trustworthy.     Family History of Suicide? [x]  Yes  []  No     Protective measures (select all that apply):  [x]  Successful past responses to stress  [x]  Spiritual/Jewish beliefs  [x]  Capacity for reality testing  [x]  Positive therapeutic relationships  [x]  Social supports/connections  [x]  Positive coping skills  [x]  Frustration tolerance/optimism  []  Children or pets in the home  [x]  Sense of responsibility to family  [x]  Agrees to treatment plan and follow up     Others (list):     High Risk Diagnoses (select all that apply):  [x]  Depression/Bipolar Disorder  []  Dual Diagnosis  []  Cardiovascular Disease  []  Schizophrenia  []  Chronic Pain  []  Epilepsy  []  Cancer  []  Personality Disorder  []  HIV/AIDS  []  Multiple Sclerosis     Dangerousness Assessment (Suicide, homicide, property destruction. ..)     Risk Factors reviewed and risk assessed to be:  [] low  [] low-moderate  [x] moderate   [] moderate-high  [] high   Protection factors reviewed and risk assessed to be:  [x] low  [] low-moderate  [] moderate   [] moderate-high  [] high   Response to treatment and risk assessed to be:  [x] low  [] low-moderate  [] moderate   [] moderate-high  [] high   Support reviewed and risk assessed to be:  [x] low  [] low-moderate  [] moderate   [] moderate-high  [] high   Acceptance of Discharge and outpatient treatment reviewed and risk assessed to be:  [x] low  [] low-moderate  [] moderate   [] moderate-high  [] high   Overall risk assessed to be:  [x] low  [x] low-moderate  [] moderate   [] moderate-high  [] high      Completion of discharge was greater than 30 minutes.  Over 50% of today's discharge was geared towards counseling and coordination of care.             Justin Mike MD  Psychiatry   [facility]

## 2021-01-27 ENCOUNTER — HOSPITAL ENCOUNTER (EMERGENCY)
Age: 36
Discharge: HOME | End: 2021-01-27
Attending: EMERGENCY MEDICINE
Payer: COMMERCIAL

## 2021-01-27 VITALS
BODY MASS INDEX: 35.31 KG/M2 | OXYGEN SATURATION: 98 % | WEIGHT: 290 LBS | TEMPERATURE: 98.2 F | HEIGHT: 76 IN | SYSTOLIC BLOOD PRESSURE: 171 MMHG | HEART RATE: 102 BPM | RESPIRATION RATE: 16 BRPM | DIASTOLIC BLOOD PRESSURE: 102 MMHG

## 2021-01-27 DIAGNOSIS — R03.0 ELEVATED BLOOD PRESSURE READING: ICD-10-CM

## 2021-01-27 DIAGNOSIS — K04.7 DENTAL ABSCESS: Primary | ICD-10-CM

## 2021-01-27 PROCEDURE — 99282 EMERGENCY DEPT VISIT SF MDM: CPT

## 2021-01-27 RX ORDER — DILTIAZEM HYDROCHLORIDE 240 MG/1
240 CAPSULE, EXTENDED RELEASE ORAL DAILY
COMMUNITY
Start: 2020-05-18 | End: 2021-05-18

## 2021-01-27 RX ORDER — TESTOSTERONE CYPIONATE 200 MG/ML
INJECTION INTRAMUSCULAR
COMMUNITY
Start: 2020-09-04

## 2021-01-27 RX ORDER — OMEPRAZOLE 20 MG/1
20 CAPSULE, DELAYED RELEASE ORAL DAILY
COMMUNITY
Start: 2020-02-05

## 2021-01-27 RX ORDER — HYDROCHLOROTHIAZIDE 12.5 MG/1
12.5 CAPSULE ORAL DAILY
COMMUNITY
Start: 2020-08-20

## 2021-01-27 RX ORDER — AMITRIPTYLINE HYDROCHLORIDE 25 MG/1
25 TABLET, FILM COATED ORAL AT BEDTIME
COMMUNITY
Start: 2020-08-20

## 2021-01-27 RX ORDER — LOSARTAN POTASSIUM 100 MG/1
100 TABLET ORAL DAILY
COMMUNITY
Start: 2020-08-20 | End: 2021-02-16

## 2021-01-27 RX ORDER — METOPROLOL SUCCINATE 50 MG/1
50 TABLET, EXTENDED RELEASE ORAL DAILY
COMMUNITY
Start: 2020-08-05 | End: 2021-08-05

## 2021-01-27 RX ORDER — AMOXICILLIN AND CLAVULANATE POTASSIUM 875; 125 MG/1; MG/1
1 TABLET, FILM COATED ORAL 2 TIMES DAILY
Qty: 20 TAB | Refills: 0 | Status: SHIPPED | OUTPATIENT
Start: 2021-01-27 | End: 2021-02-06

## 2021-01-27 RX ORDER — LIDOCAINE HYDROCHLORIDE 20 MG/ML
5 SOLUTION OROPHARYNGEAL
Qty: 1 BOTTLE | Refills: 0 | Status: SHIPPED | OUTPATIENT
Start: 2021-01-27

## 2021-01-27 RX ORDER — ATORVASTATIN CALCIUM 40 MG/1
40 TABLET, FILM COATED ORAL AT BEDTIME
COMMUNITY
Start: 2020-08-20

## 2021-01-27 NOTE — DISCHARGE INSTRUCTIONS
Dr. Long Hanley, [facility] [address], [facility]  [address]:  [address] [facility], [address] [facility]  [phone]  [place], and Extractions    [facility]  [address], Susan. \Hospital [facility] a New [facility] and Outlook.\"", [address]  [phone]  [place], and Extractions    [facility]  [address] [facility] [address]  [phone]  Fillings, Cleaning, and [address]  [address] [place], [address] [facility]  [phone]    LUIS ANGEL COUCH [facility] Department  [address], [facility] [address]  [phone]  Ages 3-18, if attending [facility]  [address] [facility], [address]  [phone]  Extractions, Alonso Kelly, [facility]    [facility]  [address], [address] [facility] [address]  [phone]  Oral Surgery - $70 required  Samella Romberg, Extractions - $100 Required    [address]   [address] [facility] [address] [facility] [address], [address]  [phone]  [facility] Only    [address], [address] [facility]  Dental Clinic Open September to June

## 2021-01-27 NOTE — ED NOTES
.I have reviewed discharge instructions with the patient. The patient verbalized understanding. Patient armband removed and shredded

## 2021-01-27 NOTE — ED PROVIDER NOTES
EMERGENCY DEPARTMENT HISTORY AND PHYSICAL EXAM    Date: 1/27/2021  Patient Name: Bradley Norris    History of Presenting Illness     Chief Complaint   Patient presents with    Dental Pain         History Provided By: Patient    11:38 AM  Bradley Norris is a 37 y.o. male with PMHX of hypertension, diabetes who presents to the emergency department C/O right upper mouth pain and swelling x 1 day. Today has mild sore throat. No alleviating or exacerbating factors. Has taken Tylenol, without relief. Pt denies fever, injury or trauma, nasal congestion, dental pain, sinus pain or pressure, facial swelling and any other sxs or complaints. PCP: Karen Herrera MD    Current Outpatient Medications   Medication Sig Dispense Refill    amitriptyline (ELAVIL) 25 mg tablet Take 25 mg by mouth At bedtime.  dilTIAZem ER (TIAZAC) 240 mg capsule Take 240 mg by mouth daily.  empagliflozin (JARDIANCE) 10 mg tablet Take 10 mg by mouth daily.  hydroCHLOROthiazide (MICROZIDE) 12.5 mg capsule Take 12.5 mg by mouth daily.  losartan (COZAAR) 100 mg tablet Take 100 mg by mouth daily.  metoprolol succinate (TOPROL-XL) 50 mg XL tablet Take 50 mg by mouth daily.  omeprazole (PRILOSEC) 20 mg capsule Take 20 mg by mouth daily.  testosterone cypionate (DEPOTESTOTERONE CYPIONATE) 200 mg/mL injection INJECT 0.5 ML (100 MG TOTAL) INTO THE MUSCLE AS DIRECTED EVERY 14 (FOURTEEN) DAYS      atorvastatin (LIPITOR) 40 mg tablet Take 40 mg by mouth At bedtime.  amoxicillin-clavulanate (Augmentin) 875-125 mg per tablet Take 1 Tab by mouth two (2) times a day for 10 days. 20 Tab 0    lidocaine (Lidocaine Viscous) 2 % solution Take 5 mL by mouth four (4) times daily as needed for Pain. Mix with equal parts water. Swish and swallow. 1 Bottle 0    ARIPiprazole (ABILIFY) 5 mg tablet Take 1.5 Tabs by mouth daily.  Indications: DEPRESSION TREATMENT ADJUNCT 45 Tab 0    insulin glargine (LANTUS) 100 unit/mL injection 40 Units by SubCUTAneous route two (2) times a day. Indications: DM 1 Vial 0    metFORMIN ER (GLUCOPHAGE XR) 750 mg tablet Take 1 Tab by mouth Before breakfast and dinner. Indications: type 2 diabetes mellitus 60 Tab 0    omega-3 acid ethyl esters (LOVAZA) 1 gram capsule Take 2 capsules by mouth two (2) times a day. 360 capsule 3    magnesium oxide (MAG-OX) 400 mg tablet Take 1 tablet by mouth daily. 30 tablet 11    fluticasone-salmeterol (ADVAIR) 100-50 mcg/dose diskus inhaler Take 1 Puff by inhalation every twelve (12) hours. 4 Inhaler 3    albuterol (PROVENTIL HFA) 90 mcg/actuation inhaler Take 2 Puffs by inhalation every four (4) hours as needed for Wheezing. 3 Inhaler 3       Past History     Past Medical History:  Past Medical History:   Diagnosis Date    Alcohol use disorder, severe, dependence ([address] 3/29/2018    Alcohol withdrawal, uncomplicated ([address] 8/48/3178    Asthma     vs Reactive Airway Disease with normal PFT 7/7/14    Diabetes [facility] 2012    HTN (hypertension)     Hyperlipidemia LDL goal < 70 12/2014    Leg pain, bilateral 2012    ? peripheral neuropathy    MDD (major depressive disorder), recurrent severe, without psychosis ([facility] 3/29/2018    Vitamin D deficiency 12/2014       Past Surgical History:  History reviewed. No pertinent surgical history.     Family History:  Family History   Problem Relation Age of Onset    Hypertension Mother     Diabetes Mother     Stroke Paternal Uncle     Diabetes Paternal Aunt     Diabetes Maternal Aunt     Diabetes Other         cousin, Type I dm       Social History:  Social History     Tobacco Use    Smoking status: Former Smoker     Types: Cigarettes    Smokeless tobacco: Never Used   Substance Use Topics    Alcohol use: Not Currently     Comment: 3 x week, liquor tequila - free pour at home 2-3 drinks at home, one pint per week    Drug use: No       Allergies:  No Known Allergies      Review of Systems Review of Systems   Constitutional: Negative for fever. HENT: Positive for mouth sores and sore throat. Negative for dental problem, ear pain, facial swelling and sinus pain. All other systems reviewed and are negative. Physical Exam     Vitals:    01/27/21 1107   BP: (!) 171/102   Pulse: (!) 102   Resp: 16   Temp: 98.2 °F (36.8 °C)   SpO2: 98%   Weight: 131.5 kg (290 lb)   Height: 6' 4\" (1.93 m)     Physical Exam  Vitals signs and nursing note reviewed. Constitutional:       General: He is not in acute distress. Appearance: Normal appearance. He is normal weight. HENT:      Head: Normocephalic and atraumatic. Mouth/Throat:     Neurological:      Mental Status: He is alert. Diagnostic Study Results     Labs -   No results found for this or any previous visit (from the past 12 hour(s)). Radiologic Studies -   No orders to display     CT Results  (Last 48 hours)    None        CXR Results  (Last 48 hours)    None          Medications given in the ED-  Medications - No data to display      Medical Decision Making   I am the first provider for this patient. I reviewed the vital signs, available nursing notes, past medical history, past surgical history, family history and social history. Vital Signs-Reviewed the patient's vital signs. Records Reviewed: Nursing Notes      Procedures:  Procedures    ED Course:  11:38 AM   Initial assessment performed. The patients presenting problems have been discussed, and they are in agreement with the care plan formulated and outlined with them. I have encouraged them to ask questions as they arise throughout their visit. Provider Notes (Medical Decision Making): Meenakshi West is a 37 y.o. male presents with right upper gum/soft palate swelling and pain. Suspect developing abscess from dental infection. No other oral lesions.   Posterior pharynx, uvula normal.  No difficulty controlling secretions or overlying facial swelling. Will start on antibiotics, viscous lidocaine as needed for pain and referral to dental clinics. Return precautions such as worsening pain/swelling or fever discussed. Diagnosis and Disposition       DISCHARGE NOTE:    Jennifer Nicholas's  results have been reviewed with him. He has been counseled regarding his diagnosis, treatment, and plan. He verbally conveys understanding and agreement of the signs, symptoms, diagnosis, treatment and prognosis and additionally agrees to follow up as discussed. He also agrees with the care-plan and conveys that all of his questions have been answered. I have also provided discharge instructions for him that include: educational information regarding their diagnosis and treatment, and list of reasons why they would want to return to the ED prior to their follow-up appointment, should his condition change. He has been provided with education for proper emergency department utilization. CLINICAL IMPRESSION:    1. Dental abscess    2. Elevated blood pressure reading        PLAN:  1. D/C Home  2. Discharge Medication List as of 1/27/2021 12:35 PM      START taking these medications    Details   amoxicillin-clavulanate (Augmentin) 875-125 mg per tablet Take 1 Tab by mouth two (2) times a day for 10 days. , Normal, Disp-20 Tab, R-0      lidocaine (Lidocaine Viscous) 2 % solution Take 5 mL by mouth four (4) times daily as needed for Pain. Mix with equal parts water. Swish and swallow., Normal, Disp-1 Bottle, R-0         CONTINUE these medications which have NOT CHANGED    Details   amitriptyline (ELAVIL) 25 mg tablet Take 25 mg by mouth At bedtime. , Historical Med      dilTIAZem ER (TIAZAC) 240 mg capsule Take 240 mg by mouth daily. , Historical Med      empagliflozin (JARDIANCE) 10 mg tablet Take 10 mg by mouth daily. , Historical Med      hydroCHLOROthiazide (MICROZIDE) 12.5 mg capsule Take 12.5 mg by mouth daily. , Historical Med losartan (COZAAR) 100 mg tablet Take 100 mg by mouth daily. , Historical Med      metoprolol succinate (TOPROL-XL) 50 mg XL tablet Take 50 mg by mouth daily. , Historical Med      omeprazole (PRILOSEC) 20 mg capsule Take 20 mg by mouth daily. , Historical Med      testosterone cypionate (DEPOTESTOTERONE CYPIONATE) 200 mg/mL injection INJECT 0.5 ML (100 MG TOTAL) INTO THE MUSCLE AS DIRECTED EVERY 14 (FOURTEEN) DAYS, Historical Med      atorvastatin (LIPITOR) 40 mg tablet Take 40 mg by mouth At bedtime. , Historical Med      ARIPiprazole (ABILIFY) 5 mg tablet Take 1.5 Tabs by mouth daily. Indications: DEPRESSION TREATMENT ADJUNCT, Print, Disp-45 Tab, R-0      insulin glargine (LANTUS) 100 unit/mL injection 40 Units by SubCUTAneous route two (2) times a day. Indications: DM, Print, Disp-1 Vial, R-0      metFORMIN ER (GLUCOPHAGE XR) 750 mg tablet Take 1 Tab by mouth Before breakfast and dinner. Indications: type 2 diabetes mellitus, Print, Disp-60 Tab, R-0      omega-3 acid ethyl esters (LOVAZA) 1 gram capsule Take 2 capsules by mouth two (2) times a day., Program, Disp-360 capsule, R-3      magnesium oxide (MAG-OX) 400 mg tablet Take 1 tablet by mouth daily. , Normal, Disp-30 tablet, R-11      fluticasone-salmeterol (ADVAIR) 100-50 mcg/dose diskus inhaler Take 1 Puff by inhalation every twelve (12) hours. , Program, Disp-4 Inhaler, R-3      albuterol (PROVENTIL HFA) 90 mcg/actuation inhaler Take 2 Puffs by inhalation every four (4) hours as needed for Wheezing. Program, 2 Puff, Disp-3 Inhaler, R-3           3. Follow-up Information     Follow up With Specialties Details Why Contact Info    See attached dental clinic list            _______________________________      Please note that this dictation was completed with [employer], the computer voice recognition software. Quite often unanticipated grammatical, syntax, homophones, and other interpretive errors are inadvertently transcribed by the computer software.   Please disregard these errors. Please excuse any errors that have escaped final proofreading.

## 2022-03-18 PROBLEM — F33.2 MDD (MAJOR DEPRESSIVE DISORDER), RECURRENT SEVERE, WITHOUT PSYCHOSIS (HCC): Status: ACTIVE | Noted: 2018-03-29

## 2022-03-19 PROBLEM — F10.20 ALCOHOL USE DISORDER, MODERATE, DEPENDENCE (HCC): Status: ACTIVE | Noted: 2018-03-29

## 2022-03-19 PROBLEM — F10.930 ALCOHOL WITHDRAWAL, UNCOMPLICATED (HCC): Status: ACTIVE | Noted: 2018-03-29

## 2024-04-18 ENCOUNTER — APPOINTMENT (OUTPATIENT)
Facility: HOSPITAL | Age: 39
DRG: 207 | End: 2024-04-18
Payer: COMMERCIAL

## 2024-04-18 ENCOUNTER — APPOINTMENT (OUTPATIENT)
Facility: HOSPITAL | Age: 39
DRG: 207 | End: 2024-04-18
Attending: INTERNAL MEDICINE
Payer: COMMERCIAL

## 2024-04-18 ENCOUNTER — HOSPITAL ENCOUNTER (INPATIENT)
Facility: HOSPITAL | Age: 39
LOS: 21 days | Discharge: NURSING, REHAB OR LONG-TERM CARE | DRG: 207 | End: 2024-05-09
Attending: EMERGENCY MEDICINE | Admitting: HOSPITALIST
Payer: COMMERCIAL

## 2024-04-18 DIAGNOSIS — R56.9 SEIZURE (HCC): ICD-10-CM

## 2024-04-18 DIAGNOSIS — F19.10 POLYSUBSTANCE ABUSE (HCC): ICD-10-CM

## 2024-04-18 DIAGNOSIS — J96.01 ACUTE RESPIRATORY FAILURE WITH HYPOXIA (HCC): ICD-10-CM

## 2024-04-18 DIAGNOSIS — J69.0 ASPIRATION PNEUMONIA OF BOTH LOWER LOBES, UNSPECIFIED ASPIRATION PNEUMONIA TYPE (HCC): ICD-10-CM

## 2024-04-18 DIAGNOSIS — R41.89 UNRESPONSIVENESS: Primary | ICD-10-CM

## 2024-04-18 DIAGNOSIS — E16.2 HYPOGLYCEMIA: ICD-10-CM

## 2024-04-18 DIAGNOSIS — I1A.0 RESISTANT HYPERTENSION: ICD-10-CM

## 2024-04-18 PROBLEM — G93.6 BRAIN EDEMA (HCC): Status: ACTIVE | Noted: 2024-04-18

## 2024-04-18 PROBLEM — F14.10 COCAINE ABUSE (HCC): Status: ACTIVE | Noted: 2024-04-18

## 2024-04-18 PROBLEM — J96.02 ACUTE RESPIRATORY FAILURE WITH HYPOXIA AND HYPERCARBIA (HCC): Status: ACTIVE | Noted: 2024-04-18

## 2024-04-18 LAB
ALBUMIN SERPL-MCNC: 3.2 G/DL (ref 3.4–5)
ALBUMIN SERPL-MCNC: 3.4 G/DL (ref 3.4–5)
ALBUMIN/GLOB SERPL: 0.9 (ref 0.8–1.7)
ALBUMIN/GLOB SERPL: 0.9 (ref 0.8–1.7)
ALP SERPL-CCNC: 101 U/L (ref 45–117)
ALP SERPL-CCNC: 91 U/L (ref 45–117)
ALT SERPL-CCNC: 27 U/L (ref 16–61)
ALT SERPL-CCNC: 32 U/L (ref 16–61)
AMMONIA PLAS-SCNC: 26 UMOL/L (ref 11–32)
AMPHET UR QL SCN: NEGATIVE
ANION GAP BLD CALC-SCNC: ABNORMAL MMOL/L (ref 10–20)
ANION GAP SERPL CALC-SCNC: 7 MMOL/L (ref 3–18)
ANION GAP SERPL CALC-SCNC: 9 MMOL/L (ref 3–18)
APPEARANCE UR: CLEAR
ARTERIAL PATENCY WRIST A: POSITIVE
ARTERIAL PATENCY WRIST A: POSITIVE
AST SERPL-CCNC: 34 U/L (ref 10–38)
AST SERPL-CCNC: 67 U/L (ref 10–38)
BACTERIA URNS QL MICRO: NEGATIVE /HPF
BARBITURATES UR QL SCN: NEGATIVE
BASE DEFICIT BLD-SCNC: 0.1 MMOL/L
BASE DEFICIT BLD-SCNC: 3 MMOL/L
BASE DEFICIT BLD-SCNC: 4.5 MMOL/L
BASE EXCESS BLD CALC-SCNC: 3.3 MMOL/L
BASOPHILS # BLD: 0 K/UL (ref 0–0.1)
BASOPHILS # BLD: 0 K/UL (ref 0–0.1)
BASOPHILS NFR BLD: 0 % (ref 0–2)
BASOPHILS NFR BLD: 0 % (ref 0–2)
BDY SITE: ABNORMAL
BENZODIAZ UR QL: NEGATIVE
BILIRUB SERPL-MCNC: 0.5 MG/DL (ref 0.2–1)
BILIRUB SERPL-MCNC: 1 MG/DL (ref 0.2–1)
BILIRUB UR QL: NEGATIVE
BUN SERPL-MCNC: 10 MG/DL (ref 7–18)
BUN SERPL-MCNC: 10 MG/DL (ref 7–18)
BUN/CREAT SERPL: 14 (ref 12–20)
BUN/CREAT SERPL: 14 (ref 12–20)
CA-I BLD-MCNC: 1.11 MMOL/L (ref 1.12–1.32)
CA-I BLD-MCNC: 1.14 MMOL/L (ref 1.12–1.32)
CA-I BLD-MCNC: 1.21 MMOL/L (ref 1.12–1.32)
CA-I BLD-MCNC: 1.23 MMOL/L (ref 1.12–1.32)
CALCIUM SERPL-MCNC: 8.8 MG/DL (ref 8.5–10.1)
CALCIUM SERPL-MCNC: 9.3 MG/DL (ref 8.5–10.1)
CANNABINOIDS UR QL SCN: POSITIVE
CHLORIDE BLD-SCNC: 100 MMOL/L (ref 98–107)
CHLORIDE BLD-SCNC: 102 MMOL/L (ref 98–107)
CHLORIDE BLD-SCNC: 103 MMOL/L (ref 98–107)
CHLORIDE BLD-SCNC: 104 MMOL/L (ref 98–107)
CHLORIDE SERPL-SCNC: 104 MMOL/L (ref 100–111)
CHLORIDE SERPL-SCNC: 105 MMOL/L (ref 100–111)
CO2 BLD-SCNC: 24 MMOL/L (ref 19–24)
CO2 BLD-SCNC: 26 MMOL/L (ref 19–24)
CO2 BLD-SCNC: 26 MMOL/L (ref 19–24)
CO2 BLD-SCNC: 27 MMOL/L (ref 19–24)
CO2 SERPL-SCNC: 26 MMOL/L (ref 21–32)
CO2 SERPL-SCNC: 28 MMOL/L (ref 21–32)
COCAINE UR QL SCN: POSITIVE
COLOR UR: YELLOW
CREAT BLD-MCNC: 0.33 MG/DL (ref 0.6–1.3)
CREAT BLD-MCNC: 0.47 MG/DL (ref 0.6–1.3)
CREAT BLD-MCNC: 0.49 MG/DL (ref 0.6–1.3)
CREAT BLD-MCNC: <0.3 MG/DL (ref 0.6–1.3)
CREAT SERPL-MCNC: 0.72 MG/DL (ref 0.6–1.3)
CREAT SERPL-MCNC: 0.74 MG/DL (ref 0.6–1.3)
DIFFERENTIAL METHOD BLD: ABNORMAL
DIFFERENTIAL METHOD BLD: ABNORMAL
ECHO BSA: 2.54 M2
EOSINOPHIL # BLD: 0 K/UL (ref 0–0.4)
EOSINOPHIL # BLD: 0 K/UL (ref 0–0.4)
EOSINOPHIL NFR BLD: 0 % (ref 0–5)
EOSINOPHIL NFR BLD: 0 % (ref 0–5)
EPITH CASTS URNS QL MICRO: NORMAL /LPF (ref 0–5)
ERYTHROCYTE [DISTWIDTH] IN BLOOD BY AUTOMATED COUNT: 13.1 % (ref 11.6–14.5)
ERYTHROCYTE [DISTWIDTH] IN BLOOD BY AUTOMATED COUNT: 13.2 % (ref 11.6–14.5)
EST. AVERAGE GLUCOSE BLD GHB EST-MCNC: 171 MG/DL
ETHANOL SERPL-MCNC: 90 MG/DL (ref 0–3)
FENTANYL: NEGATIVE
FIO2 ON VENT: 100 %
FIO2 ON VENT: 100 %
FIO2 ON VENT: 50 %
FLUAV RNA SPEC QL NAA+PROBE: NOT DETECTED
FLUBV RNA SPEC QL NAA+PROBE: NOT DETECTED
GAS FLOW.O2 O2 DELIVERY SYS: ABNORMAL
GLOBULIN SER CALC-MCNC: 3.5 G/DL (ref 2–4)
GLOBULIN SER CALC-MCNC: 3.8 G/DL (ref 2–4)
GLUCOSE BLD STRIP.AUTO-MCNC: 100 MG/DL (ref 70–110)
GLUCOSE BLD STRIP.AUTO-MCNC: 109 MG/DL (ref 70–110)
GLUCOSE BLD STRIP.AUTO-MCNC: 149 MG/DL (ref 70–110)
GLUCOSE BLD STRIP.AUTO-MCNC: 205 MG/DL (ref 70–110)
GLUCOSE BLD STRIP.AUTO-MCNC: 92 MG/DL (ref 70–110)
GLUCOSE BLD-MCNC: 103 MG/DL (ref 65–100)
GLUCOSE BLD-MCNC: 148 MG/DL (ref 65–100)
GLUCOSE BLD-MCNC: 164 MG/DL (ref 65–100)
GLUCOSE BLD-MCNC: 90 MG/DL (ref 65–100)
GLUCOSE SERPL-MCNC: 135 MG/DL (ref 74–99)
GLUCOSE SERPL-MCNC: 77 MG/DL (ref 74–99)
GLUCOSE UR STRIP.AUTO-MCNC: NEGATIVE MG/DL
HBA1C MFR BLD: 7.6 % (ref 4.2–5.6)
HCO3 BLD-SCNC: 24.1 MMOL/L (ref 22–26)
HCO3 BLD-SCNC: 26.5 MMOL/L (ref 22–26)
HCO3 BLD-SCNC: 26.7 MMOL/L (ref 22–26)
HCO3 BLD-SCNC: 26.9 MMOL/L (ref 22–26)
HCT VFR BLD AUTO: 42 % (ref 36–48)
HCT VFR BLD AUTO: 43.8 % (ref 36–48)
HGB BLD-MCNC: 14.5 G/DL (ref 13–16)
HGB BLD-MCNC: 14.6 G/DL (ref 13–16)
HGB UR QL STRIP: NEGATIVE
IMM GRANULOCYTES # BLD AUTO: 0 K/UL (ref 0–0.04)
IMM GRANULOCYTES # BLD AUTO: 0 K/UL (ref 0–0.04)
IMM GRANULOCYTES NFR BLD AUTO: 0 % (ref 0–0.5)
IMM GRANULOCYTES NFR BLD AUTO: 1 % (ref 0–0.5)
KETONES UR QL STRIP.AUTO: NEGATIVE MG/DL
L PNEUMO AG UR QL IA: NEGATIVE
LACTATE BLD-SCNC: 1.23 MMOL/L (ref 0.4–2)
LACTATE BLD-SCNC: 2.06 MMOL/L (ref 0.4–2)
LACTATE BLD-SCNC: 2.55 MMOL/L (ref 0.4–2)
LACTATE BLD-SCNC: 4.26 MMOL/L (ref 0.4–2)
LACTATE SERPL-SCNC: 2.4 MMOL/L (ref 0.4–2)
LEUKOCYTE ESTERASE UR QL STRIP.AUTO: NEGATIVE
LIPASE SERPL-CCNC: 8 U/L (ref 13–75)
LYMPHOCYTES # BLD: 0.6 K/UL (ref 0.9–3.6)
LYMPHOCYTES # BLD: 0.6 K/UL (ref 0.9–3.6)
LYMPHOCYTES NFR BLD: 6 % (ref 21–52)
LYMPHOCYTES NFR BLD: 9 % (ref 21–52)
Lab: ABNORMAL
Lab: NORMAL
MAGNESIUM SERPL-MCNC: 1.4 MG/DL (ref 1.6–2.6)
MCH RBC QN AUTO: 30.5 PG (ref 24–34)
MCH RBC QN AUTO: 31 PG (ref 24–34)
MCHC RBC AUTO-ENTMCNC: 33.3 G/DL (ref 31–37)
MCHC RBC AUTO-ENTMCNC: 34.5 G/DL (ref 31–37)
MCV RBC AUTO: 89.9 FL (ref 78–100)
MCV RBC AUTO: 91.4 FL (ref 78–100)
METHADONE UR QL: NEGATIVE
MONOCYTES # BLD: 0.5 K/UL (ref 0.05–1.2)
MONOCYTES # BLD: 0.6 K/UL (ref 0.05–1.2)
MONOCYTES NFR BLD: 10 % (ref 3–10)
MONOCYTES NFR BLD: 5 % (ref 3–10)
NEUTS SEG # BLD: 5.3 K/UL (ref 1.8–8)
NEUTS SEG # BLD: 8.2 K/UL (ref 1.8–8)
NEUTS SEG NFR BLD: 81 % (ref 40–73)
NEUTS SEG NFR BLD: 88 % (ref 40–73)
NITRITE UR QL STRIP.AUTO: NEGATIVE
NRBC # BLD: 0 K/UL (ref 0–0.01)
NRBC # BLD: 0 K/UL (ref 0–0.01)
NRBC BLD-RTO: 0 PER 100 WBC
NRBC BLD-RTO: 0 PER 100 WBC
NT PRO BNP: 345 PG/ML (ref 0–450)
OPIATES UR QL: NEGATIVE
OXYCODONE UR QL SCN: NEGATIVE
PCO2 BLD: 36.6 MMHG (ref 35–45)
PCO2 BLD: 57.2 MMHG (ref 35–45)
PCO2 BLD: 66.3 MMHG (ref 35–45)
PCO2 BLDV: 49.1 MMHG (ref 41–51)
PCP UR QL: NEGATIVE
PEEP RESPIRATORY: 5
PEEP RESPIRATORY: 8
PEEP RESPIRATORY: 8
PH BLD: 7.21 (ref 7.35–7.45)
PH BLD: 7.23 (ref 7.35–7.45)
PH BLD: 7.47 (ref 7.35–7.45)
PH BLDV: 7.34 (ref 7.32–7.42)
PH UR STRIP: 7 (ref 5–8)
PLATELET # BLD AUTO: 274 K/UL (ref 135–420)
PLATELET # BLD AUTO: 357 K/UL (ref 135–420)
PMV BLD AUTO: 9.6 FL (ref 9.2–11.8)
PMV BLD AUTO: 9.9 FL (ref 9.2–11.8)
PO2 BLD: 61 MMHG (ref 80–100)
PO2 BLD: 72 MMHG (ref 80–100)
PO2 BLD: 80 MMHG (ref 80–100)
PO2 BLDV: 21 MMHG (ref 25–40)
POTASSIUM BLD-SCNC: 2.8 MMOL/L (ref 3.5–5.1)
POTASSIUM BLD-SCNC: 2.9 MMOL/L (ref 3.5–5.1)
POTASSIUM BLD-SCNC: 3.1 MMOL/L (ref 3.5–5.1)
POTASSIUM BLD-SCNC: 3.3 MMOL/L (ref 3.5–5.1)
POTASSIUM SERPL-SCNC: 3.1 MMOL/L (ref 3.5–5.5)
POTASSIUM SERPL-SCNC: 3.4 MMOL/L (ref 3.5–5.5)
PROCALCITONIN SERPL-MCNC: 0.14 NG/ML
PROT SERPL-MCNC: 6.7 G/DL (ref 6.4–8.2)
PROT SERPL-MCNC: 7.2 G/DL (ref 6.4–8.2)
PROT UR STRIP-MCNC: 30 MG/DL
RBC # BLD AUTO: 4.67 M/UL (ref 4.35–5.65)
RBC # BLD AUTO: 4.79 M/UL (ref 4.35–5.65)
RBC #/AREA URNS HPF: NORMAL /HPF (ref 0–5)
S PNEUM AG UR QL: NEGATIVE
SAO2 % BLD: 32 %
SAO2 % BLD: 86 %
SAO2 % BLD: 90 %
SAO2 % BLD: 97 %
SARS-COV-2 RNA RESP QL NAA+PROBE: NOT DETECTED
SERVICE CMNT-IMP: ABNORMAL
SODIUM BLD-SCNC: 140 MMOL/L (ref 136–145)
SODIUM BLD-SCNC: 140 MMOL/L (ref 136–145)
SODIUM BLD-SCNC: 142 MMOL/L (ref 136–145)
SODIUM SERPL-SCNC: 139 MMOL/L (ref 136–145)
SODIUM SERPL-SCNC: 140 MMOL/L (ref 136–145)
SP GR UR REFRACTOMETRY: 1.02 (ref 1–1.03)
SPECIMEN SITE: ABNORMAL
TROPONIN I SERPL HS-MCNC: 10 NG/L (ref 0–78)
TROPONIN I SERPL HS-MCNC: 38 NG/L (ref 0–78)
UROBILINOGEN UR QL STRIP.AUTO: 1 EU/DL (ref 0.2–1)
VENTILATION MODE VENT: ABNORMAL
VT SETTING VENT: 500
WBC # BLD AUTO: 6.5 K/UL (ref 4.6–13.2)
WBC # BLD AUTO: 9.3 K/UL (ref 4.6–13.2)
WBC URNS QL MICRO: NORMAL /HPF (ref 0–5)

## 2024-04-18 PROCEDURE — 6370000000 HC RX 637 (ALT 250 FOR IP): Performed by: INTERNAL MEDICINE

## 2024-04-18 PROCEDURE — 83735 ASSAY OF MAGNESIUM: CPT

## 2024-04-18 PROCEDURE — 6360000002 HC RX W HCPCS: Performed by: INTERNAL MEDICINE

## 2024-04-18 PROCEDURE — 87449 NOS EACH ORGANISM AG IA: CPT

## 2024-04-18 PROCEDURE — 93970 EXTREMITY STUDY: CPT

## 2024-04-18 PROCEDURE — 84132 ASSAY OF SERUM POTASSIUM: CPT

## 2024-04-18 PROCEDURE — 82947 ASSAY GLUCOSE BLOOD QUANT: CPT

## 2024-04-18 PROCEDURE — 87040 BLOOD CULTURE FOR BACTERIA: CPT

## 2024-04-18 PROCEDURE — 96375 TX/PRO/DX INJ NEW DRUG ADDON: CPT

## 2024-04-18 PROCEDURE — 95822 EEG COMA OR SLEEP ONLY: CPT

## 2024-04-18 PROCEDURE — 84295 ASSAY OF SERUM SODIUM: CPT

## 2024-04-18 PROCEDURE — 2580000003 HC RX 258: Performed by: HOSPITALIST

## 2024-04-18 PROCEDURE — 71045 X-RAY EXAM CHEST 1 VIEW: CPT

## 2024-04-18 PROCEDURE — 80053 COMPREHEN METABOLIC PANEL: CPT

## 2024-04-18 PROCEDURE — 6370000000 HC RX 637 (ALT 250 FOR IP): Performed by: HOSPITALIST

## 2024-04-18 PROCEDURE — 85025 COMPLETE CBC W/AUTO DIFF WBC: CPT

## 2024-04-18 PROCEDURE — 31500 INSERT EMERGENCY AIRWAY: CPT

## 2024-04-18 PROCEDURE — 6360000002 HC RX W HCPCS: Performed by: EMERGENCY MEDICINE

## 2024-04-18 PROCEDURE — 6360000002 HC RX W HCPCS: Performed by: HOSPITALIST

## 2024-04-18 PROCEDURE — C9113 INJ PANTOPRAZOLE SODIUM, VIA: HCPCS | Performed by: HOSPITALIST

## 2024-04-18 PROCEDURE — 80307 DRUG TEST PRSMV CHEM ANLYZR: CPT

## 2024-04-18 PROCEDURE — 6360000004 HC RX CONTRAST MEDICATION: Performed by: EMERGENCY MEDICINE

## 2024-04-18 PROCEDURE — 36600 WITHDRAWAL OF ARTERIAL BLOOD: CPT

## 2024-04-18 PROCEDURE — 82962 GLUCOSE BLOOD TEST: CPT

## 2024-04-18 PROCEDURE — 2500000003 HC RX 250 WO HCPCS: Performed by: INTERNAL MEDICINE

## 2024-04-18 PROCEDURE — 83605 ASSAY OF LACTIC ACID: CPT

## 2024-04-18 PROCEDURE — 83036 HEMOGLOBIN GLYCOSYLATED A1C: CPT

## 2024-04-18 PROCEDURE — 96365 THER/PROPH/DIAG IV INF INIT: CPT

## 2024-04-18 PROCEDURE — 87154 CUL TYP ID BLD PTHGN 6+ TRGT: CPT

## 2024-04-18 PROCEDURE — 87636 SARSCOV2 & INF A&B AMP PRB: CPT

## 2024-04-18 PROCEDURE — 82077 ASSAY SPEC XCP UR&BREATH IA: CPT

## 2024-04-18 PROCEDURE — 82330 ASSAY OF CALCIUM: CPT

## 2024-04-18 PROCEDURE — 2580000003 HC RX 258: Performed by: EMERGENCY MEDICINE

## 2024-04-18 PROCEDURE — 5A1955Z RESPIRATORY VENTILATION, GREATER THAN 96 CONSECUTIVE HOURS: ICD-10-PCS | Performed by: INTERNAL MEDICINE

## 2024-04-18 PROCEDURE — 85014 HEMATOCRIT: CPT

## 2024-04-18 PROCEDURE — 94002 VENT MGMT INPAT INIT DAY: CPT

## 2024-04-18 PROCEDURE — 84145 PROCALCITONIN (PCT): CPT

## 2024-04-18 PROCEDURE — 99285 EMERGENCY DEPT VISIT HI MDM: CPT

## 2024-04-18 PROCEDURE — 82140 ASSAY OF AMMONIA: CPT

## 2024-04-18 PROCEDURE — 83690 ASSAY OF LIPASE: CPT

## 2024-04-18 PROCEDURE — 0BH17EZ INSERTION OF ENDOTRACHEAL AIRWAY INTO TRACHEA, VIA NATURAL OR ARTIFICIAL OPENING: ICD-10-PCS | Performed by: EMERGENCY MEDICINE

## 2024-04-18 PROCEDURE — 93005 ELECTROCARDIOGRAM TRACING: CPT | Performed by: EMERGENCY MEDICINE

## 2024-04-18 PROCEDURE — 2000000000 HC ICU R&B

## 2024-04-18 PROCEDURE — 2500000003 HC RX 250 WO HCPCS: Performed by: EMERGENCY MEDICINE

## 2024-04-18 PROCEDURE — 84484 ASSAY OF TROPONIN QUANT: CPT

## 2024-04-18 PROCEDURE — 2580000003 HC RX 258: Performed by: INTERNAL MEDICINE

## 2024-04-18 PROCEDURE — 81001 URINALYSIS AUTO W/SCOPE: CPT

## 2024-04-18 PROCEDURE — 71260 CT THORAX DX C+: CPT

## 2024-04-18 PROCEDURE — 70450 CT HEAD/BRAIN W/O DYE: CPT

## 2024-04-18 PROCEDURE — 82803 BLOOD GASES ANY COMBINATION: CPT

## 2024-04-18 PROCEDURE — 0D9670Z DRAINAGE OF STOMACH WITH DRAINAGE DEVICE, VIA NATURAL OR ARTIFICIAL OPENING: ICD-10-PCS | Performed by: HOSPITALIST

## 2024-04-18 PROCEDURE — 99223 1ST HOSP IP/OBS HIGH 75: CPT | Performed by: NURSE PRACTITIONER

## 2024-04-18 PROCEDURE — 83880 ASSAY OF NATRIURETIC PEPTIDE: CPT

## 2024-04-18 RX ORDER — ROCURONIUM BROMIDE 10 MG/ML
100 INJECTION, SOLUTION INTRAVENOUS
Status: COMPLETED | OUTPATIENT
Start: 2024-04-18 | End: 2024-04-18

## 2024-04-18 RX ORDER — ENOXAPARIN SODIUM 100 MG/ML
30 INJECTION SUBCUTANEOUS EVERY 12 HOURS
Status: DISCONTINUED | OUTPATIENT
Start: 2024-04-18 | End: 2024-05-09 | Stop reason: HOSPADM

## 2024-04-18 RX ORDER — DEXTROSE MONOHYDRATE 100 MG/ML
INJECTION, SOLUTION INTRAVENOUS CONTINUOUS PRN
Status: DISCONTINUED | OUTPATIENT
Start: 2024-04-18 | End: 2024-05-09 | Stop reason: HOSPADM

## 2024-04-18 RX ORDER — INSULIN LISPRO 100 [IU]/ML
0-8 INJECTION, SOLUTION INTRAVENOUS; SUBCUTANEOUS EVERY 4 HOURS
Status: DISCONTINUED | OUTPATIENT
Start: 2024-04-18 | End: 2024-04-22

## 2024-04-18 RX ORDER — ONDANSETRON 2 MG/ML
4 INJECTION INTRAMUSCULAR; INTRAVENOUS EVERY 6 HOURS PRN
Status: DISCONTINUED | OUTPATIENT
Start: 2024-04-18 | End: 2024-05-09 | Stop reason: HOSPADM

## 2024-04-18 RX ORDER — ROCURONIUM BROMIDE 10 MG/ML
INJECTION, SOLUTION INTRAVENOUS DAILY PRN
Status: COMPLETED | OUTPATIENT
Start: 2024-04-18 | End: 2024-04-18

## 2024-04-18 RX ORDER — GLUCAGON 1 MG/ML
1 KIT INJECTION PRN
Status: DISCONTINUED | OUTPATIENT
Start: 2024-04-18 | End: 2024-05-09 | Stop reason: HOSPADM

## 2024-04-18 RX ORDER — PROPOFOL 10 MG/ML
5-50 INJECTION, EMULSION INTRAVENOUS CONTINUOUS
Status: DISCONTINUED | OUTPATIENT
Start: 2024-04-18 | End: 2024-04-23

## 2024-04-18 RX ORDER — ACETAMINOPHEN 325 MG/1
650 TABLET ORAL EVERY 6 HOURS PRN
Status: DISCONTINUED | OUTPATIENT
Start: 2024-04-18 | End: 2024-05-09 | Stop reason: HOSPADM

## 2024-04-18 RX ORDER — SODIUM CHLORIDE 0.9 % (FLUSH) 0.9 %
5-40 SYRINGE (ML) INJECTION EVERY 12 HOURS SCHEDULED
Status: DISCONTINUED | OUTPATIENT
Start: 2024-04-18 | End: 2024-05-09 | Stop reason: HOSPADM

## 2024-04-18 RX ORDER — CHLORHEXIDINE GLUCONATE ORAL RINSE 1.2 MG/ML
15 SOLUTION DENTAL 2 TIMES DAILY
Status: DISCONTINUED | OUTPATIENT
Start: 2024-04-18 | End: 2024-04-25

## 2024-04-18 RX ORDER — POTASSIUM CHLORIDE 29.8 MG/ML
20 INJECTION INTRAVENOUS PRN
Status: DISCONTINUED | OUTPATIENT
Start: 2024-04-18 | End: 2024-05-09 | Stop reason: HOSPADM

## 2024-04-18 RX ORDER — MAGNESIUM SULFATE IN WATER 40 MG/ML
2000 INJECTION, SOLUTION INTRAVENOUS PRN
Status: DISCONTINUED | OUTPATIENT
Start: 2024-04-18 | End: 2024-05-09 | Stop reason: HOSPADM

## 2024-04-18 RX ORDER — POLYETHYLENE GLYCOL 3350 17 G/17G
17 POWDER, FOR SOLUTION ORAL DAILY PRN
Status: DISCONTINUED | OUTPATIENT
Start: 2024-04-18 | End: 2024-05-09 | Stop reason: HOSPADM

## 2024-04-18 RX ORDER — SODIUM CHLORIDE 0.9 % (FLUSH) 0.9 %
5-40 SYRINGE (ML) INJECTION PRN
Status: DISCONTINUED | OUTPATIENT
Start: 2024-04-18 | End: 2024-05-09 | Stop reason: HOSPADM

## 2024-04-18 RX ORDER — POTASSIUM CHLORIDE 7.45 MG/ML
10 INJECTION INTRAVENOUS PRN
Status: DISCONTINUED | OUTPATIENT
Start: 2024-04-18 | End: 2024-05-09 | Stop reason: HOSPADM

## 2024-04-18 RX ORDER — FENTANYL CITRATE-0.9 % NACL/PF 10 MCG/ML
25-200 PLASTIC BAG, INJECTION (ML) INTRAVENOUS CONTINUOUS
Status: DISCONTINUED | OUTPATIENT
Start: 2024-04-18 | End: 2024-04-21 | Stop reason: SDUPTHER

## 2024-04-18 RX ORDER — ETOMIDATE 2 MG/ML
INJECTION INTRAVENOUS DAILY PRN
Status: COMPLETED | OUTPATIENT
Start: 2024-04-18 | End: 2024-04-18

## 2024-04-18 RX ORDER — DEXTROSE MONOHYDRATE 100 MG/ML
INJECTION, SOLUTION INTRAVENOUS CONTINUOUS
Status: DISCONTINUED | OUTPATIENT
Start: 2024-04-18 | End: 2024-04-19

## 2024-04-18 RX ORDER — MIDAZOLAM HYDROCHLORIDE 2 MG/2ML
5 INJECTION, SOLUTION INTRAMUSCULAR; INTRAVENOUS
Status: COMPLETED | OUTPATIENT
Start: 2024-04-18 | End: 2024-04-18

## 2024-04-18 RX ORDER — ONDANSETRON 4 MG/1
4 TABLET, ORALLY DISINTEGRATING ORAL EVERY 8 HOURS PRN
Status: DISCONTINUED | OUTPATIENT
Start: 2024-04-18 | End: 2024-05-09 | Stop reason: HOSPADM

## 2024-04-18 RX ORDER — 0.9 % SODIUM CHLORIDE 0.9 %
1000 INTRAVENOUS SOLUTION INTRAVENOUS ONCE
Status: COMPLETED | OUTPATIENT
Start: 2024-04-18 | End: 2024-04-18

## 2024-04-18 RX ORDER — ROCURONIUM BROMIDE 10 MG/ML
INJECTION, SOLUTION INTRAVENOUS
Status: DISPENSED
Start: 2024-04-18 | End: 2024-04-19

## 2024-04-18 RX ORDER — SODIUM CHLORIDE 9 MG/ML
INJECTION, SOLUTION INTRAVENOUS PRN
Status: DISCONTINUED | OUTPATIENT
Start: 2024-04-18 | End: 2024-05-09 | Stop reason: HOSPADM

## 2024-04-18 RX ORDER — ACETAMINOPHEN 650 MG/1
650 SUPPOSITORY RECTAL EVERY 6 HOURS PRN
Status: DISCONTINUED | OUTPATIENT
Start: 2024-04-18 | End: 2024-05-09 | Stop reason: HOSPADM

## 2024-04-18 RX ADMIN — POTASSIUM BICARBONATE 40 MEQ: 782 TABLET, EFFERVESCENT ORAL at 20:25

## 2024-04-18 RX ADMIN — ETOMIDATE 20 MG: 2 INJECTION, SOLUTION INTRAVENOUS at 11:28

## 2024-04-18 RX ADMIN — PIPERACILLIN AND TAZOBACTAM 3375 MG: 3; .375 INJECTION, POWDER, LYOPHILIZED, FOR SOLUTION INTRAVENOUS at 20:23

## 2024-04-18 RX ADMIN — POTASSIUM CHLORIDE 10 MEQ: 7.45 INJECTION INTRAVENOUS at 16:33

## 2024-04-18 RX ADMIN — ROCURONIUM BROMIDE 100 MG: 10 INJECTION, SOLUTION INTRAVENOUS at 11:29

## 2024-04-18 RX ADMIN — IOPAMIDOL 100 ML: 612 INJECTION, SOLUTION INTRAVENOUS at 12:39

## 2024-04-18 RX ADMIN — SODIUM CHLORIDE, PRESERVATIVE FREE 10 ML: 5 INJECTION INTRAVENOUS at 20:24

## 2024-04-18 RX ADMIN — LEVETIRACETAM 2000 MG: 500 INJECTION INTRAVENOUS at 13:32

## 2024-04-18 RX ADMIN — INSULIN LISPRO 2 UNITS: 100 INJECTION, SOLUTION INTRAVENOUS; SUBCUTANEOUS at 21:28

## 2024-04-18 RX ADMIN — ROCURONIUM BROMIDE 50 MG: 10 INJECTION, SOLUTION INTRAVENOUS at 12:25

## 2024-04-18 RX ADMIN — CHLORHEXIDINE GLUCONATE, 0.12% ORAL RINSE 15 ML: 1.2 SOLUTION DENTAL at 20:25

## 2024-04-18 RX ADMIN — PROPOFOL 20 MCG/KG/MIN: 10 INJECTION, EMULSION INTRAVENOUS at 22:20

## 2024-04-18 RX ADMIN — VANCOMYCIN HYDROCHLORIDE 1500 MG: 10 INJECTION, POWDER, LYOPHILIZED, FOR SOLUTION INTRAVENOUS at 14:04

## 2024-04-18 RX ADMIN — PIPERACILLIN AND TAZOBACTAM 3375 MG: 3; .375 INJECTION, POWDER, LYOPHILIZED, FOR SOLUTION INTRAVENOUS at 13:24

## 2024-04-18 RX ADMIN — FOLIC ACID: 5 INJECTION, SOLUTION INTRAMUSCULAR; INTRAVENOUS; SUBCUTANEOUS at 19:11

## 2024-04-18 RX ADMIN — SODIUM CHLORIDE: 900 INJECTION, SOLUTION INTRAVENOUS at 16:33

## 2024-04-18 RX ADMIN — SODIUM CHLORIDE, PRESERVATIVE FREE 40 MG: 5 INJECTION INTRAVENOUS at 16:32

## 2024-04-18 RX ADMIN — Medication 50 MCG/HR: at 14:55

## 2024-04-18 RX ADMIN — MIDAZOLAM HYDROCHLORIDE 5 MG: 1 INJECTION, SOLUTION INTRAMUSCULAR; INTRAVENOUS at 13:13

## 2024-04-18 RX ADMIN — POTASSIUM CHLORIDE 10 MEQ: 7.45 INJECTION INTRAVENOUS at 18:09

## 2024-04-18 RX ADMIN — ENOXAPARIN SODIUM 30 MG: 100 INJECTION SUBCUTANEOUS at 15:01

## 2024-04-18 RX ADMIN — DEXTROSE MONOHYDRATE: 100 INJECTION, SOLUTION INTRAVENOUS at 15:02

## 2024-04-18 RX ADMIN — SODIUM CHLORIDE 1000 ML: 9 INJECTION, SOLUTION INTRAVENOUS at 11:37

## 2024-04-18 ASSESSMENT — PULMONARY FUNCTION TESTS
PIF_VALUE: 17
PIF_VALUE: 19

## 2024-04-18 NOTE — ED TRIAGE NOTES
Pt arrives to ed per ems, coming from home wife called this morning after coming home from work. Wife states last talked to him last night at 12 pm. Ems reports blood sugar of 31 on arrival. Ems administered d10, repeat blood sugar was 31. Pt also received narcan enroute. No response. Ems bagging.

## 2024-04-18 NOTE — ED PROVIDER NOTES
[facility] EMERGENCY DEPT  EMERGENCY DEPARTMENT ENCOUNTER    Patient Name: Louis Sarkar  MRN: [id number]  YOB: 1985  Provider: Carlos Alberto Mcfarland MD  PCP: Ty Mendez MD   Time/Date of evaluation: 11:35 AM EDT on 4/18/24    History of Presenting Illness     History Provided by: EMS  History is limited by: Altered mental status    HISTORY:   Louis Sarkar is a 38 y.o. male presenting due to unresponsiveness via EMS.  He was apparently found unresponsive by family members this morning in bed.  He was last seen around midnight last night.  His blood glucose level was low at only 31.  EMS administered IV dextrose which did bring his blood was up to 83.  He remained unresponsive.  They also administered 2 mg of IV naloxone with no effect.  On arrival he is unable to provide any history due to his spinal symptoms.  Review of chart shows that the patient has a history of hypertension, diabetes as well as polysubstance abuse. I attempted to call family but 1 phone number in the chart was disconnected and there was no answer from the other.    Nursing Notes were all reviewed and agreed with or any disagreements were addressed in the HPI.    Past History     PAST MEDICAL HISTORY:  Past Medical History:   Diagnosis Date    Alcohol use disorder, severe, dependence ([facility]) 3/29/2018    Alcohol withdrawal, uncomplicated ([facility]) 3/29/2018    Asthma     vs Reactive Airway Disease with normal PFT 7/7/14    Diabetes ([facility]) 2012    HTN (hypertension)     Hyperlipidemia LDL goal < 70 12/2014    Leg pain, bilateral 2012    ? peripheral neuropathy    MDD (major depressive disorder), recurrent severe, without psychosis ([facility]) 3/29/2018    Vitamin D deficiency 12/2014       PAST SURGICAL HISTORY:  No past surgical history on file.    FAMILY HISTORY:  Family History   Problem Relation Age of Onset    Diabetes Other         cousin, Type I dm    Diabetes Maternal Aunt     Diabetes Paternal Aunt      Simple: Patient demonstrates quick and easy understanding/Verbalized Understanding

## 2024-04-18 NOTE — ED NOTES
Report called to  RN    At/On ICU    Situation:  Pt. is 38 y.o. male with c/c of hypoglycemia and unconsciousness found by wife at home alone not responding to normal stimuli. Unknown time of duration from when this occurrence began.    Narcan was given by EMS in route with no response  Glucagon was given in route by EMS    Background:   Pt. Has history of alcohol and drug use with dx. Of type 2 diabetes mellitus and HTN.     Assessment:   A&O x0, Intubated on arrival due to respiratory distress with tachypnea and tachycardia on arrival. Pt. Was not responding to any stimuli from medics or staff.     Provider assessed pt. Because of seizure like activity observed by RN in room    Keppra infusion started at 1332 finished at 1402    POC glucose 100 at 1355 by CINDY Schwab    OG tube in left mouth  ETT Tube inserted by MD Bruna    Access:  18g in Right AC  18g in Left AC  Midline in Left Cephalic      Recommendation:  Continue to closely monitor pt., seizure precautions, hypoglycemia protocol, keep family informed of plan of care.

## 2024-04-18 NOTE — H&P
History & Physical    Patient: Louis Sarkar MRN: [id number]  CSN: [id number]    YOB: 1985  Age: 38 y.o.  Sex: male      DOA: 4/18/2024  Primary Care Provider:  Ty Mendez MD      Assessment/Plan     Active Hospital Problems    Diagnosis Date Noted    Alcohol use disorder, moderate, dependence (HCC) [F10.20] 03/29/2018     Priority: Medium    Unresponsiveness [R41.89] 04/18/2024    Acute respiratory failure with hypoxia and hypercarbia (HCC) [J96.01, J96.02] 04/18/2024    Cocaine abuse (HCC) [F14.10] 04/18/2024    Hypoglycemia [E16.2] 04/18/2024    Brain edema (HCC) [G93.6] 04/18/2024    Seizures (HCC) [R56.9] 04/18/2024    MDD (major depressive disorder), recurrent severe, without psychosis (HCC) [F33.2] 03/29/2018    HTN (hypertension) [I10] 06/09/2014    Asthma [J45.909] 04/10/2013         Admit to icu     CRITICAL CARE PLAN     Resp   Acute respiratory failure with hypoxia    HOB>30 degrees. Bronchodilators as needed . Vent managed per intensivist  Multifocal pneumonia vs aspiration pneumonia , broad coverage IV antibiotics check a COVID-19 and flu   Ct chest Extensive bilateral lung infiltrates consistent with multifocal pneumonia  versus aspiration pneumonitis.  Check strep pneumo, Legionella    CVS   Hypertensive emergency  BP getting better after sedation medication  Continue monitoring    Will check a cardiac enzyme     ID      Follow up blood and urine cx.   ANTIBIOTICS . Zosyn IV and vancomycin  Trend temps, wbc.    Heme/onc   Follow H&H, plts. INR.     Renal   Metabolic acidosis  Went setting adjusted, continue IV antibiotics treat for possible infection    Trend BUN, Cr, follow I/O, cummins in place.   Check and replace Mg, K, phos.icu electrolytes replacement protocol     Neuro:   Unresponsiveness, CT indicated possible cerebral edema  Neurology has been consulted, continue Keppra prevent seizures  On fentanyl propofol    Possible seizure continue keppra      Endocrine

## 2024-04-18 NOTE — ED NOTES
Propofol infusion paused due to sudden drop in BP.    Provider and Charge RN aware.    Will continue to closely monitor pt.

## 2024-04-19 ENCOUNTER — APPOINTMENT (OUTPATIENT)
Facility: HOSPITAL | Age: 39
DRG: 207 | End: 2024-04-19
Payer: COMMERCIAL

## 2024-04-19 ENCOUNTER — APPOINTMENT (OUTPATIENT)
Facility: HOSPITAL | Age: 39
DRG: 207 | End: 2024-04-19
Attending: INTERNAL MEDICINE
Payer: COMMERCIAL

## 2024-04-19 LAB
ACCESSION NUMBER, LLC1M: ABNORMAL
ACINETOBACTER CALCOAC BAUMANNII COMPLEX BY PCR: NOT DETECTED
ALBUMIN SERPL-MCNC: 2.6 G/DL (ref 3.4–5)
ALBUMIN SERPL-MCNC: 2.6 G/DL (ref 3.4–5)
ALBUMIN/GLOB SERPL: 0.7 (ref 0.8–1.7)
ALBUMIN/GLOB SERPL: 0.7 (ref 0.8–1.7)
ALP SERPL-CCNC: 76 U/L (ref 45–117)
ALP SERPL-CCNC: 84 U/L (ref 45–117)
ALT SERPL-CCNC: 24 U/L (ref 16–61)
ALT SERPL-CCNC: 25 U/L (ref 16–61)
ANION GAP SERPL CALC-SCNC: 3 MMOL/L (ref 3–18)
ANION GAP SERPL CALC-SCNC: 6 MMOL/L (ref 3–18)
AST SERPL-CCNC: 44 U/L (ref 10–38)
AST SERPL-CCNC: 53 U/L (ref 10–38)
B FRAGILIS DNA BLD POS QL NAA+NON-PROBE: NOT DETECTED
BASOPHILS # BLD: 0 K/UL (ref 0–0.1)
BASOPHILS NFR BLD: 0 % (ref 0–2)
BILIRUB DIRECT SERPL-MCNC: 0.2 MG/DL (ref 0–0.2)
BILIRUB SERPL-MCNC: 0.7 MG/DL (ref 0.2–1)
BILIRUB SERPL-MCNC: 0.8 MG/DL (ref 0.2–1)
BIOFIRE TEST COMMENT: ABNORMAL
BUN SERPL-MCNC: 15 MG/DL (ref 7–18)
BUN SERPL-MCNC: 16 MG/DL (ref 7–18)
BUN/CREAT SERPL: 14 (ref 12–20)
BUN/CREAT SERPL: 15 (ref 12–20)
C ALBICANS DNA BLD POS QL NAA+NON-PROBE: NOT DETECTED
C AURIS DNA BLD POS QL NAA+NON-PROBE: NOT DETECTED
C GATTII+NEOFOR DNA BLD POS QL NAA+N-PRB: NOT DETECTED
C GLABRATA DNA BLD POS QL NAA+NON-PROBE: NOT DETECTED
C KRUSEI DNA BLD POS QL NAA+NON-PROBE: NOT DETECTED
C PARAP DNA BLD POS QL NAA+NON-PROBE: NOT DETECTED
C TROPICLS DNA BLD POS QL NAA+NON-PROBE: NOT DETECTED
CA-I SERPL-SCNC: 1.06 MMOL/L (ref 1.12–1.32)
CA-I SERPL-SCNC: 1.17 MMOL/L (ref 1.12–1.32)
CALCIUM SERPL-MCNC: 8.4 MG/DL (ref 8.5–10.1)
CALCIUM SERPL-MCNC: 9.1 MG/DL (ref 8.5–10.1)
CHLORIDE SERPL-SCNC: 102 MMOL/L (ref 100–111)
CHLORIDE SERPL-SCNC: 103 MMOL/L (ref 100–111)
CO2 SERPL-SCNC: 28 MMOL/L (ref 21–32)
CO2 SERPL-SCNC: 31 MMOL/L (ref 21–32)
CREAT SERPL-MCNC: 1.07 MG/DL (ref 0.6–1.3)
CREAT SERPL-MCNC: 1.1 MG/DL (ref 0.6–1.3)
DIFFERENTIAL METHOD BLD: ABNORMAL
E CLOAC COMP DNA BLD POS NAA+NON-PROBE: NOT DETECTED
E COLI DNA BLD POS QL NAA+NON-PROBE: NOT DETECTED
E FAECALIS DNA BLD POS QL NAA+NON-PROBE: NOT DETECTED
E FAECIUM DNA BLD POS QL NAA+NON-PROBE: NOT DETECTED
ECHO AO ASC DIAM: 3.2 CM
ECHO AO ASCENDING AORTA INDEX: 1.3 CM/M2
ECHO AO ROOT DIAM: 4 CM
ECHO AO ROOT INDEX: 1.63 CM/M2
ECHO AV AREA PEAK VELOCITY: 3 CM2
ECHO AV AREA VTI: 3.2 CM2
ECHO AV AREA/BSA PEAK VELOCITY: 1.2 CM2/M2
ECHO AV AREA/BSA VTI: 1.3 CM2/M2
ECHO AV MEAN GRADIENT: 4 MMHG
ECHO AV MEAN VELOCITY: 0.9 M/S
ECHO AV PEAK GRADIENT: 9 MMHG
ECHO AV PEAK VELOCITY: 1.5 M/S
ECHO AV VELOCITY RATIO: 0.8
ECHO AV VTI: 25.6 CM
ECHO BSA: 2.54 M2
ECHO LA DIAMETER INDEX: 1.46 CM/M2
ECHO LA DIAMETER: 3.6 CM
ECHO LA TO AORTIC ROOT RATIO: 0.9
ECHO LA VOL A-L A2C: 58 ML (ref 18–58)
ECHO LA VOL A-L A4C: 52 ML (ref 18–58)
ECHO LA VOL BP: 52 ML (ref 18–58)
ECHO LA VOL MOD A2C: 56 ML (ref 18–58)
ECHO LA VOL MOD A4C: 51 ML (ref 18–58)
ECHO LA VOL/BSA BIPLANE: 21 ML/M2 (ref 16–34)
ECHO LA VOLUME AREA LENGTH: 55 ML
ECHO LA VOLUME INDEX A-L A2C: 24 ML/M2 (ref 16–34)
ECHO LA VOLUME INDEX A-L A4C: 21 ML/M2 (ref 16–34)
ECHO LA VOLUME INDEX AREA LENGTH: 22 ML/M2 (ref 16–34)
ECHO LA VOLUME INDEX MOD A2C: 23 ML/M2 (ref 16–34)
ECHO LA VOLUME INDEX MOD A4C: 21 ML/M2 (ref 16–34)
ECHO LV E' LATERAL VELOCITY: 12 CM/S
ECHO LV E' SEPTAL VELOCITY: 9 CM/S
ECHO LV EDV A2C: 136 ML
ECHO LV EDV A4C: 132 ML
ECHO LV EDV BP: 137 ML (ref 67–155)
ECHO LV EDV INDEX A4C: 54 ML/M2
ECHO LV EDV INDEX BP: 56 ML/M2
ECHO LV EDV NDEX A2C: 55 ML/M2
ECHO LV EJECTION FRACTION A2C: 67 %
ECHO LV EJECTION FRACTION A4C: 64 %
ECHO LV EJECTION FRACTION BIPLANE: 67 % (ref 55–100)
ECHO LV ESV A2C: 45 ML
ECHO LV ESV A4C: 48 ML
ECHO LV ESV BP: 46 ML (ref 22–58)
ECHO LV ESV INDEX A2C: 18 ML/M2
ECHO LV ESV INDEX A4C: 20 ML/M2
ECHO LV ESV INDEX BP: 19 ML/M2
ECHO LV FRACTIONAL SHORTENING: 35 % (ref 28–44)
ECHO LV INTERNAL DIMENSION DIASTOLE INDEX: 1.95 CM/M2
ECHO LV INTERNAL DIMENSION DIASTOLIC: 4.8 CM (ref 4.2–5.9)
ECHO LV INTERNAL DIMENSION SYSTOLIC INDEX: 1.26 CM/M2
ECHO LV INTERNAL DIMENSION SYSTOLIC: 3.1 CM
ECHO LV IVSD: 1.2 CM (ref 0.6–1)
ECHO LV MASS 2D: 219.1 G (ref 88–224)
ECHO LV MASS INDEX 2D: 89.1 G/M2 (ref 49–115)
ECHO LV POSTERIOR WALL DIASTOLIC: 1.2 CM (ref 0.6–1)
ECHO LV RELATIVE WALL THICKNESS RATIO: 0.5
ECHO LVOT AREA: 3.8 CM2
ECHO LVOT AV VTI INDEX: 0.84
ECHO LVOT DIAM: 2.2 CM
ECHO LVOT MEAN GRADIENT: 3 MMHG
ECHO LVOT PEAK GRADIENT: 5 MMHG
ECHO LVOT PEAK VELOCITY: 1.2 M/S
ECHO LVOT STROKE VOLUME INDEX: 33.4 ML/M2
ECHO LVOT SV: 82.1 ML
ECHO LVOT VTI: 21.6 CM
ECHO MV A VELOCITY: 0.91 M/S
ECHO MV E DECELERATION TIME (DT): 210 MS
ECHO MV E VELOCITY: 0.7 M/S
ECHO MV E/A RATIO: 0.77
ECHO MV E/E' LATERAL: 5.83
ECHO MV E/E' RATIO (AVERAGED): 6.81
ECHO PV MAX VELOCITY: 1.1 M/S
ECHO PV MEAN GRADIENT: 3 MMHG
ECHO PV MEAN VELOCITY: 0.8 M/S
ECHO PV PEAK GRADIENT: 5 MMHG
ECHO RA END SYSTOLIC VOLUME APICAL 4 CHAMBER INDEX BSA: 34 ML/M2
ECHO RA VOLUME: 84 ML
ECHO RV TAPSE: 3 CM (ref 1.7–?)
EKG ATRIAL RATE: 118 BPM
EKG ATRIAL RATE: 152 BPM
EKG DIAGNOSIS: NORMAL
EKG DIAGNOSIS: NORMAL
EKG P AXIS: 51 DEGREES
EKG P AXIS: 77 DEGREES
EKG P-R INTERVAL: 136 MS
EKG P-R INTERVAL: 160 MS
EKG Q-T INTERVAL: 342 MS
EKG Q-T INTERVAL: 346 MS
EKG QRS DURATION: 88 MS
EKG QRS DURATION: 90 MS
EKG QTC CALCULATION (BAZETT): 479 MS
EKG QTC CALCULATION (BAZETT): 550 MS
EKG R AXIS: 193 DEGREES
EKG R AXIS: 232 DEGREES
EKG T AXIS: 33 DEGREES
EKG T AXIS: 45 DEGREES
EKG VENTRICULAR RATE: 118 BPM
EKG VENTRICULAR RATE: 152 BPM
ENTEROBACTERALES DNA BLD POS NAA+N-PRB: NOT DETECTED
EOSINOPHIL # BLD: 0 K/UL (ref 0–0.4)
EOSINOPHIL NFR BLD: 0 % (ref 0–5)
ERYTHROCYTE [DISTWIDTH] IN BLOOD BY AUTOMATED COUNT: 13.4 % (ref 11.6–14.5)
GLOBULIN SER CALC-MCNC: 3.6 G/DL (ref 2–4)
GLOBULIN SER CALC-MCNC: 4 G/DL (ref 2–4)
GLUCOSE BLD STRIP.AUTO-MCNC: 214 MG/DL (ref 70–110)
GLUCOSE BLD STRIP.AUTO-MCNC: 222 MG/DL (ref 70–110)
GLUCOSE BLD STRIP.AUTO-MCNC: 230 MG/DL (ref 70–110)
GLUCOSE BLD STRIP.AUTO-MCNC: 231 MG/DL (ref 70–110)
GLUCOSE BLD STRIP.AUTO-MCNC: 239 MG/DL (ref 70–110)
GLUCOSE BLD STRIP.AUTO-MCNC: 272 MG/DL (ref 70–110)
GLUCOSE SERPL-MCNC: 243 MG/DL (ref 74–99)
GLUCOSE SERPL-MCNC: 244 MG/DL (ref 74–99)
GP B STREP DNA BLD POS QL NAA+NON-PROBE: NOT DETECTED
HAEM INFLU DNA BLD POS QL NAA+NON-PROBE: NOT DETECTED
HCT VFR BLD AUTO: 35.3 % (ref 36–48)
HGB BLD-MCNC: 12.3 G/DL (ref 13–16)
IMM GRANULOCYTES # BLD AUTO: 0.1 K/UL (ref 0–0.04)
IMM GRANULOCYTES NFR BLD AUTO: 1 % (ref 0–0.5)
K OXYTOCA DNA BLD POS QL NAA+NON-PROBE: NOT DETECTED
KLEBSIELLA SP DNA BLD POS QL NAA+NON-PRB: NOT DETECTED
KLEBSIELLA SP DNA BLD POS QL NAA+NON-PRB: NOT DETECTED
L MONOCYTOG DNA BLD POS QL NAA+NON-PROBE: NOT DETECTED
LACTATE SERPL-SCNC: 1.2 MMOL/L (ref 0.4–2)
LYMPHOCYTES # BLD: 1.7 K/UL (ref 0.9–3.6)
LYMPHOCYTES NFR BLD: 13 % (ref 21–52)
MAGNESIUM SERPL-MCNC: 1.2 MG/DL (ref 1.6–2.6)
MAGNESIUM SERPL-MCNC: 1.9 MG/DL (ref 1.6–2.6)
MCH RBC QN AUTO: 31.9 PG (ref 24–34)
MCHC RBC AUTO-ENTMCNC: 34.8 G/DL (ref 31–37)
MCV RBC AUTO: 91.5 FL (ref 78–100)
MONOCYTES # BLD: 1.1 K/UL (ref 0.05–1.2)
MONOCYTES NFR BLD: 9 % (ref 3–10)
N MEN DNA BLD POS QL NAA+NON-PROBE: NOT DETECTED
NEUTS SEG # BLD: 10.1 K/UL (ref 1.8–8)
NEUTS SEG NFR BLD: 77 % (ref 40–73)
NRBC # BLD: 0 K/UL (ref 0–0.01)
NRBC BLD-RTO: 0 PER 100 WBC
P AERUGINOSA DNA BLD POS NAA+NON-PROBE: NOT DETECTED
PHOSPHATE SERPL-MCNC: 3.2 MG/DL (ref 2.5–4.9)
PLATELET # BLD AUTO: 270 K/UL (ref 135–420)
PMV BLD AUTO: 9.9 FL (ref 9.2–11.8)
POTASSIUM SERPL-SCNC: 3.6 MMOL/L (ref 3.5–5.5)
POTASSIUM SERPL-SCNC: 3.8 MMOL/L (ref 3.5–5.5)
PROCALCITONIN SERPL-MCNC: 30.48 NG/ML
PROT SERPL-MCNC: 6.2 G/DL (ref 6.4–8.2)
PROT SERPL-MCNC: 6.6 G/DL (ref 6.4–8.2)
PROTEUS SP DNA BLD POS QL NAA+NON-PROBE: NOT DETECTED
RBC # BLD AUTO: 3.86 M/UL (ref 4.35–5.65)
RESISTANT GENE TARGETS: ABNORMAL
S AUREUS DNA BLD POS QL NAA+NON-PROBE: NOT DETECTED
S AUREUS+CONS DNA BLD POS NAA+NON-PROBE: DETECTED
S EPIDERMIDIS DNA BLD POS QL NAA+NON-PRB: NOT DETECTED
S LUGDUNENSIS DNA BLD POS QL NAA+NON-PRB: NOT DETECTED
S MALTOPHILIA DNA BLD POS QL NAA+NON-PRB: NOT DETECTED
S MARCESCENS DNA BLD POS NAA+NON-PROBE: NOT DETECTED
S PNEUM DNA BLD POS QL NAA+NON-PROBE: NOT DETECTED
S PYO DNA BLD POS QL NAA+NON-PROBE: NOT DETECTED
SALMONELLA DNA BLD POS QL NAA+NON-PROBE: NOT DETECTED
SODIUM SERPL-SCNC: 136 MMOL/L (ref 136–145)
SODIUM SERPL-SCNC: 137 MMOL/L (ref 136–145)
STREPTOCOCCUS DNA BLD POS NAA+NON-PROBE: NOT DETECTED
TSH SERPL DL<=0.05 MIU/L-ACNC: 0.41 UIU/ML (ref 0.36–3.74)
WBC # BLD AUTO: 13.1 K/UL (ref 4.6–13.2)

## 2024-04-19 PROCEDURE — 83605 ASSAY OF LACTIC ACID: CPT

## 2024-04-19 PROCEDURE — 2500000003 HC RX 250 WO HCPCS: Performed by: INTERNAL MEDICINE

## 2024-04-19 PROCEDURE — 2000000000 HC ICU R&B

## 2024-04-19 PROCEDURE — 93306 TTE W/DOPPLER COMPLETE: CPT | Performed by: INTERNAL MEDICINE

## 2024-04-19 PROCEDURE — 2580000003 HC RX 258: Performed by: EMERGENCY MEDICINE

## 2024-04-19 PROCEDURE — 84100 ASSAY OF PHOSPHORUS: CPT

## 2024-04-19 PROCEDURE — 6360000002 HC RX W HCPCS: Performed by: INTERNAL MEDICINE

## 2024-04-19 PROCEDURE — 36600 WITHDRAWAL OF ARTERIAL BLOOD: CPT

## 2024-04-19 PROCEDURE — 2580000003 HC RX 258: Performed by: HOSPITALIST

## 2024-04-19 PROCEDURE — 80076 HEPATIC FUNCTION PANEL: CPT

## 2024-04-19 PROCEDURE — A4216 STERILE WATER/SALINE, 10 ML: HCPCS | Performed by: HOSPITALIST

## 2024-04-19 PROCEDURE — 85025 COMPLETE CBC W/AUTO DIFF WBC: CPT

## 2024-04-19 PROCEDURE — 6370000000 HC RX 637 (ALT 250 FOR IP): Performed by: INTERNAL MEDICINE

## 2024-04-19 PROCEDURE — 2580000003 HC RX 258: Performed by: INTERNAL MEDICINE

## 2024-04-19 PROCEDURE — 99232 SBSQ HOSP IP/OBS MODERATE 35: CPT | Performed by: NURSE PRACTITIONER

## 2024-04-19 PROCEDURE — 70551 MRI BRAIN STEM W/O DYE: CPT

## 2024-04-19 PROCEDURE — 82803 BLOOD GASES ANY COMBINATION: CPT

## 2024-04-19 PROCEDURE — 87205 SMEAR GRAM STAIN: CPT

## 2024-04-19 PROCEDURE — 82330 ASSAY OF CALCIUM: CPT

## 2024-04-19 PROCEDURE — 2700000000 HC OXYGEN THERAPY PER DAY

## 2024-04-19 PROCEDURE — 6360000002 HC RX W HCPCS: Performed by: EMERGENCY MEDICINE

## 2024-04-19 PROCEDURE — 71045 X-RAY EXAM CHEST 1 VIEW: CPT

## 2024-04-19 PROCEDURE — 93306 TTE W/DOPPLER COMPLETE: CPT

## 2024-04-19 PROCEDURE — 94640 AIRWAY INHALATION TREATMENT: CPT

## 2024-04-19 PROCEDURE — 6360000002 HC RX W HCPCS: Performed by: HOSPITALIST

## 2024-04-19 PROCEDURE — 80053 COMPREHEN METABOLIC PANEL: CPT

## 2024-04-19 PROCEDURE — 84443 ASSAY THYROID STIM HORMONE: CPT

## 2024-04-19 PROCEDURE — C9113 INJ PANTOPRAZOLE SODIUM, VIA: HCPCS | Performed by: HOSPITALIST

## 2024-04-19 PROCEDURE — 84145 PROCALCITONIN (PCT): CPT

## 2024-04-19 PROCEDURE — 83735 ASSAY OF MAGNESIUM: CPT

## 2024-04-19 PROCEDURE — 82962 GLUCOSE BLOOD TEST: CPT

## 2024-04-19 PROCEDURE — 94003 VENT MGMT INPAT SUBQ DAY: CPT

## 2024-04-19 PROCEDURE — 87070 CULTURE OTHR SPECIMN AEROBIC: CPT

## 2024-04-19 PROCEDURE — 6370000000 HC RX 637 (ALT 250 FOR IP): Performed by: HOSPITALIST

## 2024-04-19 RX ORDER — FOLIC ACID 1 MG/1
1 TABLET ORAL DAILY
Status: DISCONTINUED | OUTPATIENT
Start: 2024-04-19 | End: 2024-04-26

## 2024-04-19 RX ORDER — MAGNESIUM SULFATE IN WATER 40 MG/ML
4000 INJECTION, SOLUTION INTRAVENOUS ONCE
Status: DISCONTINUED | OUTPATIENT
Start: 2024-04-19 | End: 2024-04-24

## 2024-04-19 RX ORDER — GAUZE BANDAGE 2" X 2"
100 BANDAGE TOPICAL DAILY
Status: DISCONTINUED | OUTPATIENT
Start: 2024-04-19 | End: 2024-04-26

## 2024-04-19 RX ORDER — CALCIUM GLUCONATE 20 MG/ML
2000 INJECTION, SOLUTION INTRAVENOUS ONCE
Status: COMPLETED | OUTPATIENT
Start: 2024-04-19 | End: 2024-04-19

## 2024-04-19 RX ORDER — INSULIN GLARGINE 100 [IU]/ML
10 INJECTION, SOLUTION SUBCUTANEOUS 2 TIMES DAILY
Status: DISCONTINUED | OUTPATIENT
Start: 2024-04-19 | End: 2024-04-23

## 2024-04-19 RX ADMIN — Medication 75 MCG/HR: at 02:24

## 2024-04-19 RX ADMIN — SODIUM CHLORIDE, PRESERVATIVE FREE 40 MG: 5 INJECTION INTRAVENOUS at 08:25

## 2024-04-19 RX ADMIN — FOLIC ACID 1 MG: 1 TABLET ORAL at 09:55

## 2024-04-19 RX ADMIN — SODIUM CHLORIDE, PRESERVATIVE FREE 10 ML: 5 INJECTION INTRAVENOUS at 08:25

## 2024-04-19 RX ADMIN — IPRATROPIUM BROMIDE 0.5 MG: 0.5 SOLUTION RESPIRATORY (INHALATION) at 10:06

## 2024-04-19 RX ADMIN — ENOXAPARIN SODIUM 30 MG: 100 INJECTION SUBCUTANEOUS at 14:15

## 2024-04-19 RX ADMIN — SODIUM CHLORIDE, PRESERVATIVE FREE 10 ML: 5 INJECTION INTRAVENOUS at 21:37

## 2024-04-19 RX ADMIN — INSULIN LISPRO 2 UNITS: 100 INJECTION, SOLUTION INTRAVENOUS; SUBCUTANEOUS at 04:49

## 2024-04-19 RX ADMIN — INSULIN GLARGINE 10 UNITS: 100 INJECTION, SOLUTION SUBCUTANEOUS at 09:55

## 2024-04-19 RX ADMIN — INSULIN LISPRO 4 UNITS: 100 INJECTION, SOLUTION INTRAVENOUS; SUBCUTANEOUS at 14:15

## 2024-04-19 RX ADMIN — MAGNESIUM SULFATE HEPTAHYDRATE 2000 MG: 40 INJECTION, SOLUTION INTRAVENOUS at 06:25

## 2024-04-19 RX ADMIN — CHLORHEXIDINE GLUCONATE, 0.12% ORAL RINSE 15 ML: 1.2 SOLUTION DENTAL at 08:25

## 2024-04-19 RX ADMIN — VANCOMYCIN HYDROCHLORIDE 1500 MG: 10 INJECTION, POWDER, LYOPHILIZED, FOR SOLUTION INTRAVENOUS at 14:50

## 2024-04-19 RX ADMIN — INSULIN LISPRO 2 UNITS: 100 INJECTION, SOLUTION INTRAVENOUS; SUBCUTANEOUS at 21:30

## 2024-04-19 RX ADMIN — PIPERACILLIN AND TAZOBACTAM 3375 MG: 3; .375 INJECTION, POWDER, LYOPHILIZED, FOR SOLUTION INTRAVENOUS at 14:09

## 2024-04-19 RX ADMIN — ENOXAPARIN SODIUM 30 MG: 100 INJECTION SUBCUTANEOUS at 00:57

## 2024-04-19 RX ADMIN — INSULIN GLARGINE 10 UNITS: 100 INJECTION, SOLUTION SUBCUTANEOUS at 21:32

## 2024-04-19 RX ADMIN — CALCIUM GLUCONATE 2000 MG: 20 INJECTION, SOLUTION INTRAVENOUS at 06:25

## 2024-04-19 RX ADMIN — PIPERACILLIN AND TAZOBACTAM 3375 MG: 3; .375 INJECTION, POWDER, LYOPHILIZED, FOR SOLUTION INTRAVENOUS at 05:30

## 2024-04-19 RX ADMIN — PIPERACILLIN AND TAZOBACTAM 3375 MG: 3; .375 INJECTION, POWDER, LYOPHILIZED, FOR SOLUTION INTRAVENOUS at 21:36

## 2024-04-19 RX ADMIN — PIPERACILLIN AND TAZOBACTAM 3375 MG: 3; .375 INJECTION, POWDER, LYOPHILIZED, FOR SOLUTION INTRAVENOUS at 00:52

## 2024-04-19 RX ADMIN — LEVETIRACETAM 500 MG: 100 INJECTION, SOLUTION INTRAVENOUS at 00:52

## 2024-04-19 RX ADMIN — INSULIN LISPRO 2 UNITS: 100 INJECTION, SOLUTION INTRAVENOUS; SUBCUTANEOUS at 18:53

## 2024-04-19 RX ADMIN — THIAMINE HCL TAB 100 MG 100 MG: 100 TAB at 09:55

## 2024-04-19 RX ADMIN — MAGNESIUM SULFATE HEPTAHYDRATE 2000 MG: 40 INJECTION, SOLUTION INTRAVENOUS at 07:32

## 2024-04-19 RX ADMIN — CHLORHEXIDINE GLUCONATE, 0.12% ORAL RINSE 15 ML: 1.2 SOLUTION DENTAL at 21:30

## 2024-04-19 RX ADMIN — PROPOFOL 15 MCG/KG/MIN: 10 INJECTION, EMULSION INTRAVENOUS at 22:54

## 2024-04-19 RX ADMIN — PROPOFOL 15 MCG/KG/MIN: 10 INJECTION, EMULSION INTRAVENOUS at 05:28

## 2024-04-19 RX ADMIN — IPRATROPIUM BROMIDE 0.5 MG: 0.5 SOLUTION RESPIRATORY (INHALATION) at 19:20

## 2024-04-19 RX ADMIN — LEVETIRACETAM 500 MG: 100 INJECTION, SOLUTION INTRAVENOUS at 15:51

## 2024-04-19 RX ADMIN — IPRATROPIUM BROMIDE 0.5 MG: 0.5 SOLUTION RESPIRATORY (INHALATION) at 14:44

## 2024-04-19 RX ADMIN — DEXTROSE MONOHYDRATE: 100 INJECTION, SOLUTION INTRAVENOUS at 01:05

## 2024-04-19 ASSESSMENT — PULMONARY FUNCTION TESTS
PIF_VALUE: 16
PIF_VALUE: 30
PIF_VALUE: 20
PIF_VALUE: 23
PIF_VALUE: 22
PIF_VALUE: 19
PIF_VALUE: 21

## 2024-04-19 ASSESSMENT — PAIN SCALES - GENERAL: PAINLEVEL_OUTOF10: 0

## 2024-04-19 NOTE — H&P
Hospitalist Progress Note    Patient: Louis Sarkar MRN: [id number]  CSN: [id number]    YOB: 1985  Age: 38 y.o.  Sex: male    DOA: 4/18/2024 LOS:  LOS: 1 day          Chief Complaint:    38-year-old male with history of alcohol abuse, multidrug use and diabetes admitted with unresponsiveness ,multifocal pneumonia and hypertensive urgency      Assessment/Plan   dharmesh   Acute respiratory failure with hypoxia    HOB>30 degrees. Bronchodilators as needed . Vent managed per intensivist  Multifocal pneumonia vs aspiration pneumonia , broad coverage IV antibiotics check a COVID-19 and flu   Ct chest Extensive bilateral lung infiltrates consistent with multifocal pneumonia  versus aspiration pneumonitis.  Check strep pneumo, Legionella     CVS   Hypertensive emergency  BP getting better after sedation medication  Continue monitoring     Will check a cardiac enzyme     ID     Follow up blood and urine cx.   ID consult  Bactremia repeat cultures  ANTIBIOTICS . Zosyn IV and vancomycin  Trend temps, wbc.     Heme/onc   Follow H&H, plts. INR.     Renal   Monitor I o   Acidosis improved  vent setting adjusted, continue IV antibiotics treat for possible infection     Trend BUN, Cr, follow I/O, cummins in place.   Check and replace Mg, K, phos.icu electrolytes replacement protocol      Neuro:   Unresponsiveness, CT indicated possible cerebral edema  Neurology has been consulted, continue Keppra prevent seizures  On fentanyl propofol   Possible seizure continue keppra      Endocrine -   Profound hypoglycemia :  D10 , hypoglycemia protocol , continue monitor glucose follow FSG, get TSH     Psych: alcohol use and cocaine abuse   B1 , already on sedation      GI - NPO for now.  Protonix     Prophylaxis - DVT: SCDs, GI: protonix     Disposition :  Active Hospital Problems    Diagnosis Date Noted    Alcohol use disorder, moderate, dependence (HCC) [F10.20] 03/29/2018     Priority: Medium    Unresponsiveness

## 2024-04-19 NOTE — CARE COORDINATION
04/19/24 1138   Service Assessment   Patient Orientation Sedated;Other (see comment)  (intubated)   Cognition Other (see comment)  (ARMAND)   History Provided By Significant Other   Primary Caregiver Self   Accompanied By/Relationship Coretta Pa/spouse   Support Systems Spouse/Significant Other   Patient's Healthcare Decision Maker is: Legal Next of Kin   PCP Verified by CM Yes  (Dr Paris of [facility])   Last Visit to PCP Within last 3 months   Prior Functional Level Independent in ADLs/IADLs   Current Functional Level Other (see comment)  (ARMAND; intubated/sedated)   Can patient return to prior living arrangement Yes   Ability to make needs known: Other (see comment)  (intubated/sedated)   Family able to assist with home care needs: Yes   Would you like for me to discuss the discharge plan with any other family members/significant others, and if so, who?   (spouse at bedside.)   Financial Resources Medicaid   Community Resources None   CM/SW Referral Other (see comment)   Social/Functional History   Lives With Spouse   Active  No   Occupation Full time employment   Discharge Planning   Type of Residence Apartment   Living Arrangements Spouse/Significant Other   Current Services Prior To Admission None   Potential Assistance Needed Skilled Nursing Facility;Long Term Acute Care;Home Care  (pending clinical progress)   DME Ordered? No   Potential Assistance Purchasing Medications No  (spouse reports no barriers to getting his precriptions)   Type of Home Care Services None   Patient expects to be discharged to: Unknown   Services At/After Discharge   Transition of Care Consult (CM Consult) Discharge Planning   Condition of Participation: Discharge Planning   The Plan for Transition of Care is related to the following treatment goals: \"To get better\"     Per chart review, patient is pending brain MRI today. CM assessment completed with spouse at the bedside. Disposition TBD pending medical

## 2024-04-20 ENCOUNTER — APPOINTMENT (OUTPATIENT)
Facility: HOSPITAL | Age: 39
DRG: 207 | End: 2024-04-20
Payer: COMMERCIAL

## 2024-04-20 PROBLEM — F17.200 SMOKER: Status: ACTIVE | Noted: 2024-04-20

## 2024-04-20 LAB
ALBUMIN SERPL-MCNC: 2.4 G/DL (ref 3.4–5)
ALBUMIN/GLOB SERPL: 0.6 (ref 0.8–1.7)
ALP SERPL-CCNC: 80 U/L (ref 45–117)
ALT SERPL-CCNC: 21 U/L (ref 16–61)
ANION GAP SERPL CALC-SCNC: 6 MMOL/L (ref 3–18)
ARTERIAL PATENCY WRIST A: POSITIVE
AST SERPL-CCNC: 38 U/L (ref 10–38)
BACTERIA SPEC CULT: ABNORMAL
BACTERIA SPEC CULT: ABNORMAL
BASE EXCESS BLD CALC-SCNC: 3.9 MMOL/L
BASOPHILS # BLD: 0 K/UL (ref 0–0.1)
BASOPHILS NFR BLD: 0 % (ref 0–2)
BDY SITE: ABNORMAL
BILIRUB SERPL-MCNC: 0.7 MG/DL (ref 0.2–1)
BUN SERPL-MCNC: 15 MG/DL (ref 7–18)
BUN/CREAT SERPL: 15 (ref 12–20)
CA-I SERPL-SCNC: 1.19 MMOL/L (ref 1.12–1.32)
CALCIUM SERPL-MCNC: 8.7 MG/DL (ref 8.5–10.1)
CHLORIDE SERPL-SCNC: 104 MMOL/L (ref 100–111)
CO2 SERPL-SCNC: 28 MMOL/L (ref 21–32)
CREAT SERPL-MCNC: 1.02 MG/DL (ref 0.6–1.3)
DIFFERENTIAL METHOD BLD: ABNORMAL
EOSINOPHIL # BLD: 0.1 K/UL (ref 0–0.4)
EOSINOPHIL NFR BLD: 1 % (ref 0–5)
ERYTHROCYTE [DISTWIDTH] IN BLOOD BY AUTOMATED COUNT: 13.3 % (ref 11.6–14.5)
GAS FLOW.O2 O2 DELIVERY SYS: ABNORMAL
GAS FLOW.O2 SETTING OXYMISER: 18 BPM
GLOBULIN SER CALC-MCNC: 3.8 G/DL (ref 2–4)
GLUCOSE BLD STRIP.AUTO-MCNC: 209 MG/DL (ref 70–110)
GLUCOSE BLD STRIP.AUTO-MCNC: 218 MG/DL (ref 70–110)
GLUCOSE BLD STRIP.AUTO-MCNC: 222 MG/DL (ref 70–110)
GLUCOSE BLD STRIP.AUTO-MCNC: 225 MG/DL (ref 70–110)
GLUCOSE BLD STRIP.AUTO-MCNC: 228 MG/DL (ref 70–110)
GLUCOSE BLD STRIP.AUTO-MCNC: 242 MG/DL (ref 70–110)
GLUCOSE SERPL-MCNC: 215 MG/DL (ref 74–99)
GRAM STN SPEC: ABNORMAL
GRAM STN SPEC: ABNORMAL
HCO3 BLD-SCNC: 27.5 MMOL/L (ref 22–26)
HCT VFR BLD AUTO: 32.3 % (ref 36–48)
HGB BLD-MCNC: 10.8 G/DL (ref 13–16)
IMM GRANULOCYTES # BLD AUTO: 0.1 K/UL (ref 0–0.04)
IMM GRANULOCYTES NFR BLD AUTO: 1 % (ref 0–0.5)
INR PPP: 1 (ref 0.9–1.1)
LACTATE SERPL-SCNC: 0.9 MMOL/L (ref 0.4–2)
LYMPHOCYTES # BLD: 1.5 K/UL (ref 0.9–3.6)
LYMPHOCYTES NFR BLD: 12 % (ref 21–52)
MAGNESIUM SERPL-MCNC: 1.8 MG/DL (ref 1.6–2.6)
MCH RBC QN AUTO: 30.8 PG (ref 24–34)
MCHC RBC AUTO-ENTMCNC: 33.4 G/DL (ref 31–37)
MCV RBC AUTO: 92 FL (ref 78–100)
MONOCYTES # BLD: 0.9 K/UL (ref 0.05–1.2)
MONOCYTES NFR BLD: 7 % (ref 3–10)
NEUTS SEG # BLD: 10.2 K/UL (ref 1.8–8)
NEUTS SEG NFR BLD: 80 % (ref 40–73)
NRBC # BLD: 0 K/UL (ref 0–0.01)
NRBC BLD-RTO: 0 PER 100 WBC
NT PRO BNP: 35 PG/ML (ref 0–450)
O2/TOTAL GAS SETTING VFR VENT: 35 %
PCO2 BLD: 37.5 MMHG (ref 35–45)
PEEP RESPIRATORY: 5 CMH2O
PH BLD: 7.47 (ref 7.35–7.45)
PHOSPHATE SERPL-MCNC: 1.6 MG/DL (ref 2.5–4.9)
PHOSPHATE SERPL-MCNC: 2.4 MG/DL (ref 2.5–4.9)
PLATELET # BLD AUTO: 229 K/UL (ref 135–420)
PMV BLD AUTO: 9.7 FL (ref 9.2–11.8)
PO2 BLD: 83 MMHG (ref 80–100)
POTASSIUM SERPL-SCNC: 3.4 MMOL/L (ref 3.5–5.5)
POTASSIUM SERPL-SCNC: 3.8 MMOL/L (ref 3.5–5.5)
PROCALCITONIN SERPL-MCNC: 16.89 NG/ML
PROT SERPL-MCNC: 6.2 G/DL (ref 6.4–8.2)
PROTHROMBIN TIME: 13.5 SEC (ref 11.9–14.7)
RBC # BLD AUTO: 3.51 M/UL (ref 4.35–5.65)
SAO2 % BLD: 96.9 % (ref 92–97)
SERVICE CMNT-IMP: ABNORMAL
SERVICE CMNT-IMP: ABNORMAL
SODIUM SERPL-SCNC: 138 MMOL/L (ref 136–145)
SPECIMEN TYPE: ABNORMAL
VENTILATION MODE VENT: ABNORMAL
VT SETTING VENT: 500 ML
WBC # BLD AUTO: 12.8 K/UL (ref 4.6–13.2)

## 2024-04-20 PROCEDURE — 85025 COMPLETE CBC W/AUTO DIFF WBC: CPT

## 2024-04-20 PROCEDURE — 2580000003 HC RX 258: Performed by: HOSPITALIST

## 2024-04-20 PROCEDURE — 84100 ASSAY OF PHOSPHORUS: CPT

## 2024-04-20 PROCEDURE — 6360000002 HC RX W HCPCS: Performed by: HOSPITALIST

## 2024-04-20 PROCEDURE — 2500000003 HC RX 250 WO HCPCS: Performed by: HOSPITALIST

## 2024-04-20 PROCEDURE — 6370000000 HC RX 637 (ALT 250 FOR IP): Performed by: INTERNAL MEDICINE

## 2024-04-20 PROCEDURE — 6370000000 HC RX 637 (ALT 250 FOR IP): Performed by: HOSPITALIST

## 2024-04-20 PROCEDURE — 83880 ASSAY OF NATRIURETIC PEPTIDE: CPT

## 2024-04-20 PROCEDURE — A4216 STERILE WATER/SALINE, 10 ML: HCPCS | Performed by: HOSPITALIST

## 2024-04-20 PROCEDURE — 94640 AIRWAY INHALATION TREATMENT: CPT

## 2024-04-20 PROCEDURE — 82962 GLUCOSE BLOOD TEST: CPT

## 2024-04-20 PROCEDURE — 82330 ASSAY OF CALCIUM: CPT

## 2024-04-20 PROCEDURE — 6360000002 HC RX W HCPCS: Performed by: INTERNAL MEDICINE

## 2024-04-20 PROCEDURE — 82803 BLOOD GASES ANY COMBINATION: CPT

## 2024-04-20 PROCEDURE — 83735 ASSAY OF MAGNESIUM: CPT

## 2024-04-20 PROCEDURE — 83605 ASSAY OF LACTIC ACID: CPT

## 2024-04-20 PROCEDURE — 85610 PROTHROMBIN TIME: CPT

## 2024-04-20 PROCEDURE — 36600 WITHDRAWAL OF ARTERIAL BLOOD: CPT

## 2024-04-20 PROCEDURE — 2000000000 HC ICU R&B

## 2024-04-20 PROCEDURE — 2580000003 HC RX 258: Performed by: INTERNAL MEDICINE

## 2024-04-20 PROCEDURE — 84132 ASSAY OF SERUM POTASSIUM: CPT

## 2024-04-20 PROCEDURE — 80053 COMPREHEN METABOLIC PANEL: CPT

## 2024-04-20 PROCEDURE — 87040 BLOOD CULTURE FOR BACTERIA: CPT

## 2024-04-20 PROCEDURE — 51702 INSERT TEMP BLADDER CATH: CPT

## 2024-04-20 PROCEDURE — C9113 INJ PANTOPRAZOLE SODIUM, VIA: HCPCS | Performed by: HOSPITALIST

## 2024-04-20 PROCEDURE — 6360000002 HC RX W HCPCS: Performed by: EMERGENCY MEDICINE

## 2024-04-20 PROCEDURE — 94003 VENT MGMT INPAT SUBQ DAY: CPT

## 2024-04-20 PROCEDURE — 2700000000 HC OXYGEN THERAPY PER DAY

## 2024-04-20 PROCEDURE — 84145 PROCALCITONIN (PCT): CPT

## 2024-04-20 PROCEDURE — 71045 X-RAY EXAM CHEST 1 VIEW: CPT

## 2024-04-20 PROCEDURE — 2580000003 HC RX 258: Performed by: EMERGENCY MEDICINE

## 2024-04-20 RX ORDER — BUDESONIDE 0.25 MG/2ML
0.25 INHALANT ORAL
Status: DISCONTINUED | OUTPATIENT
Start: 2024-04-20 | End: 2024-04-27

## 2024-04-20 RX ORDER — ARFORMOTEROL TARTRATE 15 UG/2ML
15 SOLUTION RESPIRATORY (INHALATION)
Status: DISCONTINUED | OUTPATIENT
Start: 2024-04-20 | End: 2024-04-27

## 2024-04-20 RX ORDER — ACETYLCYSTEINE 100 MG/ML
4 SOLUTION ORAL; RESPIRATORY (INHALATION)
Status: DISCONTINUED | OUTPATIENT
Start: 2024-04-20 | End: 2024-04-21

## 2024-04-20 RX ADMIN — ENOXAPARIN SODIUM 30 MG: 100 INJECTION SUBCUTANEOUS at 01:18

## 2024-04-20 RX ADMIN — VANCOMYCIN HYDROCHLORIDE 1500 MG: 10 INJECTION, POWDER, LYOPHILIZED, FOR SOLUTION INTRAVENOUS at 01:18

## 2024-04-20 RX ADMIN — INSULIN LISPRO 2 UNITS: 100 INJECTION, SOLUTION INTRAVENOUS; SUBCUTANEOUS at 17:14

## 2024-04-20 RX ADMIN — CHLORHEXIDINE GLUCONATE, 0.12% ORAL RINSE 15 ML: 1.2 SOLUTION DENTAL at 22:16

## 2024-04-20 RX ADMIN — PIPERACILLIN AND TAZOBACTAM 3375 MG: 3; .375 INJECTION, POWDER, LYOPHILIZED, FOR SOLUTION INTRAVENOUS at 05:13

## 2024-04-20 RX ADMIN — ACETYLCYSTEINE 400 MG: 100 INHALANT RESPIRATORY (INHALATION) at 10:59

## 2024-04-20 RX ADMIN — INSULIN LISPRO 2 UNITS: 100 INJECTION, SOLUTION INTRAVENOUS; SUBCUTANEOUS at 22:16

## 2024-04-20 RX ADMIN — SODIUM CHLORIDE, PRESERVATIVE FREE 40 MG: 5 INJECTION INTRAVENOUS at 08:31

## 2024-04-20 RX ADMIN — BUDESONIDE 250 MCG: 0.25 INHALANT RESPIRATORY (INHALATION) at 19:27

## 2024-04-20 RX ADMIN — ARFORMOTEROL TARTRATE 15 MCG: 15 SOLUTION RESPIRATORY (INHALATION) at 10:59

## 2024-04-20 RX ADMIN — INSULIN GLARGINE 10 UNITS: 100 INJECTION, SOLUTION SUBCUTANEOUS at 22:17

## 2024-04-20 RX ADMIN — INSULIN LISPRO 2 UNITS: 100 INJECTION, SOLUTION INTRAVENOUS; SUBCUTANEOUS at 02:57

## 2024-04-20 RX ADMIN — INSULIN GLARGINE 10 UNITS: 100 INJECTION, SOLUTION SUBCUTANEOUS at 08:32

## 2024-04-20 RX ADMIN — INSULIN LISPRO 2 UNITS: 100 INJECTION, SOLUTION INTRAVENOUS; SUBCUTANEOUS at 11:24

## 2024-04-20 RX ADMIN — IPRATROPIUM BROMIDE 0.5 MG: 0.5 SOLUTION RESPIRATORY (INHALATION) at 08:39

## 2024-04-20 RX ADMIN — POTASSIUM BICARBONATE 40 MEQ: 782 TABLET, EFFERVESCENT ORAL at 05:16

## 2024-04-20 RX ADMIN — LEVETIRACETAM 500 MG: 100 INJECTION, SOLUTION INTRAVENOUS at 13:56

## 2024-04-20 RX ADMIN — IPRATROPIUM BROMIDE 0.5 MG: 0.5 SOLUTION RESPIRATORY (INHALATION) at 13:46

## 2024-04-20 RX ADMIN — LEVETIRACETAM 500 MG: 100 INJECTION, SOLUTION INTRAVENOUS at 00:53

## 2024-04-20 RX ADMIN — PIPERACILLIN AND TAZOBACTAM 3375 MG: 3; .375 INJECTION, POWDER, LYOPHILIZED, FOR SOLUTION INTRAVENOUS at 11:28

## 2024-04-20 RX ADMIN — THIAMINE HCL TAB 100 MG 100 MG: 100 TAB at 08:31

## 2024-04-20 RX ADMIN — BUDESONIDE 250 MCG: 0.25 INHALANT RESPIRATORY (INHALATION) at 10:59

## 2024-04-20 RX ADMIN — IPRATROPIUM BROMIDE 0.5 MG: 0.5 SOLUTION RESPIRATORY (INHALATION) at 10:59

## 2024-04-20 RX ADMIN — ENOXAPARIN SODIUM 30 MG: 100 INJECTION SUBCUTANEOUS at 13:56

## 2024-04-20 RX ADMIN — FOLIC ACID 1 MG: 1 TABLET ORAL at 08:31

## 2024-04-20 RX ADMIN — PROPOFOL 15 MCG/KG/MIN: 10 INJECTION, EMULSION INTRAVENOUS at 05:37

## 2024-04-20 RX ADMIN — ARFORMOTEROL TARTRATE 15 MCG: 15 SOLUTION RESPIRATORY (INHALATION) at 19:27

## 2024-04-20 RX ADMIN — PIPERACILLIN AND TAZOBACTAM 3375 MG: 3; .375 INJECTION, POWDER, LYOPHILIZED, FOR SOLUTION INTRAVENOUS at 22:17

## 2024-04-20 RX ADMIN — INSULIN LISPRO 2 UNITS: 100 INJECTION, SOLUTION INTRAVENOUS; SUBCUTANEOUS at 06:45

## 2024-04-20 RX ADMIN — SODIUM CHLORIDE, PRESERVATIVE FREE 10 ML: 5 INJECTION INTRAVENOUS at 22:17

## 2024-04-20 RX ADMIN — SODIUM CHLORIDE, PRESERVATIVE FREE 10 ML: 5 INJECTION INTRAVENOUS at 11:11

## 2024-04-20 RX ADMIN — ACETYLCYSTEINE 400 MG: 100 INHALANT RESPIRATORY (INHALATION) at 19:27

## 2024-04-20 RX ADMIN — SODIUM PHOSPHATE, MONOBASIC, MONOHYDRATE AND SODIUM PHOSPHATE, DIBASIC, ANHYDROUS 15 MMOL: 142; 276 INJECTION, SOLUTION INTRAVENOUS at 17:04

## 2024-04-20 RX ADMIN — CHLORHEXIDINE GLUCONATE, 0.12% ORAL RINSE 15 ML: 1.2 SOLUTION DENTAL at 08:32

## 2024-04-20 RX ADMIN — SODIUM PHOSPHATE, MONOBASIC, MONOHYDRATE AND SODIUM PHOSPHATE, DIBASIC, ANHYDROUS 15 MMOL: 142; 276 INJECTION, SOLUTION INTRAVENOUS at 05:38

## 2024-04-20 RX ADMIN — IPRATROPIUM BROMIDE 0.5 MG: 0.5 SOLUTION RESPIRATORY (INHALATION) at 19:27

## 2024-04-20 ASSESSMENT — PAIN SCALES - GENERAL
PAINLEVEL_OUTOF10: 0
PAINLEVEL_OUTOF10: 0

## 2024-04-20 ASSESSMENT — PULMONARY FUNCTION TESTS
PIF_VALUE: 18
PIF_VALUE: 21
PIF_VALUE: 17
PIF_VALUE: 26

## 2024-04-21 ENCOUNTER — APPOINTMENT (OUTPATIENT)
Facility: HOSPITAL | Age: 39
DRG: 207 | End: 2024-04-21
Payer: COMMERCIAL

## 2024-04-21 LAB
ALBUMIN SERPL-MCNC: 2.4 G/DL (ref 3.4–5)
ALBUMIN/GLOB SERPL: 0.6 (ref 0.8–1.7)
ALP SERPL-CCNC: 80 U/L (ref 45–117)
ALT SERPL-CCNC: 24 U/L (ref 16–61)
ANION GAP SERPL CALC-SCNC: 6 MMOL/L (ref 3–18)
ARTERIAL PATENCY WRIST A: POSITIVE
ARTERIAL PATENCY WRIST A: POSITIVE
AST SERPL-CCNC: 67 U/L (ref 10–38)
BACTERIA SPEC CULT: NORMAL
BASE EXCESS BLD CALC-SCNC: 1.5 MMOL/L
BASE EXCESS BLD CALC-SCNC: 3.9 MMOL/L
BASOPHILS # BLD: 0 K/UL (ref 0–0.1)
BASOPHILS NFR BLD: 0 % (ref 0–2)
BDY SITE: ABNORMAL
BDY SITE: ABNORMAL
BILIRUB SERPL-MCNC: 0.5 MG/DL (ref 0.2–1)
BUN SERPL-MCNC: 11 MG/DL (ref 7–18)
BUN/CREAT SERPL: 13 (ref 12–20)
CA-I SERPL-SCNC: 1.13 MMOL/L (ref 1.12–1.32)
CALCIUM SERPL-MCNC: 9 MG/DL (ref 8.5–10.1)
CHLORIDE SERPL-SCNC: 104 MMOL/L (ref 100–111)
CO2 SERPL-SCNC: 28 MMOL/L (ref 21–32)
CREAT SERPL-MCNC: 0.86 MG/DL (ref 0.6–1.3)
DIFFERENTIAL METHOD BLD: ABNORMAL
EOSINOPHIL # BLD: 0.1 K/UL (ref 0–0.4)
EOSINOPHIL NFR BLD: 1 % (ref 0–5)
ERYTHROCYTE [DISTWIDTH] IN BLOOD BY AUTOMATED COUNT: 13.1 % (ref 11.6–14.5)
GAS FLOW.O2 O2 DELIVERY SYS: ABNORMAL
GAS FLOW.O2 O2 DELIVERY SYS: ABNORMAL
GAS FLOW.O2 SETTING OXYMISER: 18 BPM
GAS FLOW.O2 SETTING OXYMISER: 20 BPM
GLOBULIN SER CALC-MCNC: 4 G/DL (ref 2–4)
GLUCOSE BLD STRIP.AUTO-MCNC: 163 MG/DL (ref 70–110)
GLUCOSE BLD STRIP.AUTO-MCNC: 166 MG/DL (ref 70–110)
GLUCOSE BLD STRIP.AUTO-MCNC: 179 MG/DL (ref 70–110)
GLUCOSE BLD STRIP.AUTO-MCNC: 202 MG/DL (ref 70–110)
GLUCOSE BLD STRIP.AUTO-MCNC: 205 MG/DL (ref 70–110)
GLUCOSE BLD STRIP.AUTO-MCNC: 217 MG/DL (ref 70–110)
GLUCOSE SERPL-MCNC: 207 MG/DL (ref 74–99)
GRAM STN SPEC: NORMAL
HCO3 BLD-SCNC: 26.1 MMOL/L (ref 22–26)
HCO3 BLD-SCNC: 28 MMOL/L (ref 22–26)
HCT VFR BLD AUTO: 30.3 % (ref 36–48)
HGB BLD-MCNC: 10.2 G/DL (ref 13–16)
IMM GRANULOCYTES # BLD AUTO: 0 K/UL (ref 0–0.04)
IMM GRANULOCYTES NFR BLD AUTO: 0 % (ref 0–0.5)
LYMPHOCYTES # BLD: 1.4 K/UL (ref 0.9–3.6)
LYMPHOCYTES NFR BLD: 13 % (ref 21–52)
MAGNESIUM SERPL-MCNC: 1.8 MG/DL (ref 1.6–2.6)
MCH RBC QN AUTO: 31.2 PG (ref 24–34)
MCHC RBC AUTO-ENTMCNC: 33.7 G/DL (ref 31–37)
MCV RBC AUTO: 92.7 FL (ref 78–100)
MONOCYTES # BLD: 1 K/UL (ref 0.05–1.2)
MONOCYTES NFR BLD: 9 % (ref 3–10)
NEUTS SEG # BLD: 8.4 K/UL (ref 1.8–8)
NEUTS SEG NFR BLD: 76 % (ref 40–73)
NRBC # BLD: 0 K/UL (ref 0–0.01)
NRBC BLD-RTO: 0 PER 100 WBC
O2/TOTAL GAS SETTING VFR VENT: 35 %
O2/TOTAL GAS SETTING VFR VENT: 45 %
PCO2 BLD: 39.6 MMHG (ref 35–45)
PCO2 BLD: 40.3 MMHG (ref 35–45)
PEEP RESPIRATORY: 5 CMH2O
PEEP RESPIRATORY: 5 CMH2O
PH BLD: 7.42 (ref 7.35–7.45)
PH BLD: 7.46 (ref 7.35–7.45)
PHOSPHATE SERPL-MCNC: 2.7 MG/DL (ref 2.5–4.9)
PLATELET # BLD AUTO: 222 K/UL (ref 135–420)
PMV BLD AUTO: 9.7 FL (ref 9.2–11.8)
PO2 BLD: 106 MMHG (ref 80–100)
PO2 BLD: 79 MMHG (ref 80–100)
POTASSIUM SERPL-SCNC: 3.1 MMOL/L (ref 3.5–5.5)
POTASSIUM SERPL-SCNC: 3.4 MMOL/L (ref 3.5–5.5)
PROT SERPL-MCNC: 6.4 G/DL (ref 6.4–8.2)
RBC # BLD AUTO: 3.27 M/UL (ref 4.35–5.65)
SAO2 % BLD: 96.2 % (ref 92–97)
SAO2 % BLD: 98.2 % (ref 92–97)
SERVICE CMNT-IMP: ABNORMAL
SERVICE CMNT-IMP: NORMAL
SODIUM SERPL-SCNC: 138 MMOL/L (ref 136–145)
SPECIMEN TYPE: ABNORMAL
SPECIMEN TYPE: ABNORMAL
VENTILATION MODE VENT: ABNORMAL
VENTILATION MODE VENT: AC
VT SETTING VENT: 500 ML
VT SETTING VENT: 500 ML
WBC # BLD AUTO: 11 K/UL (ref 4.6–13.2)

## 2024-04-21 PROCEDURE — 71045 X-RAY EXAM CHEST 1 VIEW: CPT

## 2024-04-21 PROCEDURE — 84100 ASSAY OF PHOSPHORUS: CPT

## 2024-04-21 PROCEDURE — 82962 GLUCOSE BLOOD TEST: CPT

## 2024-04-21 PROCEDURE — 6360000002 HC RX W HCPCS: Performed by: HOSPITALIST

## 2024-04-21 PROCEDURE — 2000000000 HC ICU R&B

## 2024-04-21 PROCEDURE — 94640 AIRWAY INHALATION TREATMENT: CPT

## 2024-04-21 PROCEDURE — 2580000003 HC RX 258: Performed by: INTERNAL MEDICINE

## 2024-04-21 PROCEDURE — 6370000000 HC RX 637 (ALT 250 FOR IP): Performed by: HOSPITALIST

## 2024-04-21 PROCEDURE — 82330 ASSAY OF CALCIUM: CPT

## 2024-04-21 PROCEDURE — 84132 ASSAY OF SERUM POTASSIUM: CPT

## 2024-04-21 PROCEDURE — 6370000000 HC RX 637 (ALT 250 FOR IP): Performed by: FAMILY MEDICINE

## 2024-04-21 PROCEDURE — 2700000000 HC OXYGEN THERAPY PER DAY

## 2024-04-21 PROCEDURE — 6360000002 HC RX W HCPCS: Performed by: FAMILY MEDICINE

## 2024-04-21 PROCEDURE — A4216 STERILE WATER/SALINE, 10 ML: HCPCS | Performed by: HOSPITALIST

## 2024-04-21 PROCEDURE — 2500000003 HC RX 250 WO HCPCS: Performed by: INTERNAL MEDICINE

## 2024-04-21 PROCEDURE — 85025 COMPLETE CBC W/AUTO DIFF WBC: CPT

## 2024-04-21 PROCEDURE — 80053 COMPREHEN METABOLIC PANEL: CPT

## 2024-04-21 PROCEDURE — 6360000002 HC RX W HCPCS: Performed by: INTERNAL MEDICINE

## 2024-04-21 PROCEDURE — C9113 INJ PANTOPRAZOLE SODIUM, VIA: HCPCS | Performed by: HOSPITALIST

## 2024-04-21 PROCEDURE — 6370000000 HC RX 637 (ALT 250 FOR IP): Performed by: INTERNAL MEDICINE

## 2024-04-21 PROCEDURE — 83735 ASSAY OF MAGNESIUM: CPT

## 2024-04-21 PROCEDURE — 36600 WITHDRAWAL OF ARTERIAL BLOOD: CPT

## 2024-04-21 PROCEDURE — 2580000003 HC RX 258: Performed by: HOSPITALIST

## 2024-04-21 PROCEDURE — 2580000003 HC RX 258: Performed by: EMERGENCY MEDICINE

## 2024-04-21 PROCEDURE — 82803 BLOOD GASES ANY COMBINATION: CPT

## 2024-04-21 PROCEDURE — 6360000002 HC RX W HCPCS: Performed by: EMERGENCY MEDICINE

## 2024-04-21 PROCEDURE — 94003 VENT MGMT INPAT SUBQ DAY: CPT

## 2024-04-21 RX ORDER — MIDAZOLAM HYDROCHLORIDE 2 MG/2ML
2 INJECTION, SOLUTION INTRAMUSCULAR; INTRAVENOUS
Status: DISCONTINUED | OUTPATIENT
Start: 2024-04-21 | End: 2024-04-24

## 2024-04-21 RX ORDER — FENTANYL CITRATE-0.9 % NACL/PF 10 MCG/ML
25-200 PLASTIC BAG, INJECTION (ML) INTRAVENOUS CONTINUOUS
Status: DISCONTINUED | OUTPATIENT
Start: 2024-04-21 | End: 2024-04-23

## 2024-04-21 RX ORDER — FENTANYL CITRATE-0.9 % NACL/PF 10 MCG/ML
25-200 PLASTIC BAG, INJECTION (ML) INTRAVENOUS CONTINUOUS
Status: DISCONTINUED | OUTPATIENT
Start: 2024-04-21 | End: 2024-04-22

## 2024-04-21 RX ORDER — CODEINE SULFATE 15 MG/1
30 TABLET ORAL ONCE
Status: COMPLETED | OUTPATIENT
Start: 2024-04-21 | End: 2024-04-21

## 2024-04-21 RX ADMIN — POTASSIUM CHLORIDE 10 MEQ: 7.45 INJECTION INTRAVENOUS at 17:09

## 2024-04-21 RX ADMIN — MIDAZOLAM HYDROCHLORIDE 2 MG: 1 INJECTION, SOLUTION INTRAMUSCULAR; INTRAVENOUS at 20:50

## 2024-04-21 RX ADMIN — CHLORHEXIDINE GLUCONATE, 0.12% ORAL RINSE 15 ML: 1.2 SOLUTION DENTAL at 12:20

## 2024-04-21 RX ADMIN — CODEINE SULFATE 30 MG: 15 TABLET ORAL at 10:35

## 2024-04-21 RX ADMIN — SODIUM CHLORIDE, PRESERVATIVE FREE 10 ML: 5 INJECTION INTRAVENOUS at 10:08

## 2024-04-21 RX ADMIN — IPRATROPIUM BROMIDE 0.5 MG: 0.5 SOLUTION RESPIRATORY (INHALATION) at 12:48

## 2024-04-21 RX ADMIN — POTASSIUM CHLORIDE 10 MEQ: 7.45 INJECTION INTRAVENOUS at 18:12

## 2024-04-21 RX ADMIN — PIPERACILLIN AND TAZOBACTAM 3375 MG: 3; .375 INJECTION, POWDER, LYOPHILIZED, FOR SOLUTION INTRAVENOUS at 12:18

## 2024-04-21 RX ADMIN — INSULIN LISPRO 2 UNITS: 100 INJECTION, SOLUTION INTRAVENOUS; SUBCUTANEOUS at 01:41

## 2024-04-21 RX ADMIN — IPRATROPIUM BROMIDE 0.5 MG: 0.5 SOLUTION RESPIRATORY (INHALATION) at 07:08

## 2024-04-21 RX ADMIN — MIDAZOLAM HYDROCHLORIDE 2 MG: 1 INJECTION, SOLUTION INTRAMUSCULAR; INTRAVENOUS at 22:24

## 2024-04-21 RX ADMIN — MIDAZOLAM HYDROCHLORIDE 2 MG: 1 INJECTION, SOLUTION INTRAMUSCULAR; INTRAVENOUS at 05:48

## 2024-04-21 RX ADMIN — THIAMINE HCL TAB 100 MG 100 MG: 100 TAB at 10:07

## 2024-04-21 RX ADMIN — ACETAMINOPHEN 650 MG: 325 TABLET ORAL at 21:15

## 2024-04-21 RX ADMIN — PIPERACILLIN AND TAZOBACTAM 3375 MG: 3; .375 INJECTION, POWDER, LYOPHILIZED, FOR SOLUTION INTRAVENOUS at 21:13

## 2024-04-21 RX ADMIN — SODIUM CHLORIDE, PRESERVATIVE FREE 10 ML: 5 INJECTION INTRAVENOUS at 20:50

## 2024-04-21 RX ADMIN — PROPOFOL 30 MCG/KG/MIN: 10 INJECTION, EMULSION INTRAVENOUS at 17:08

## 2024-04-21 RX ADMIN — ENOXAPARIN SODIUM 30 MG: 100 INJECTION SUBCUTANEOUS at 01:41

## 2024-04-21 RX ADMIN — DOXYCYCLINE 100 MG: 100 INJECTION, POWDER, LYOPHILIZED, FOR SOLUTION INTRAVENOUS at 18:17

## 2024-04-21 RX ADMIN — INSULIN GLARGINE 10 UNITS: 100 INJECTION, SOLUTION SUBCUTANEOUS at 10:08

## 2024-04-21 RX ADMIN — PROPOFOL 30 MCG/KG/MIN: 10 INJECTION, EMULSION INTRAVENOUS at 13:33

## 2024-04-21 RX ADMIN — POTASSIUM CHLORIDE 10 MEQ: 7.45 INJECTION INTRAVENOUS at 20:20

## 2024-04-21 RX ADMIN — SODIUM CHLORIDE, PRESERVATIVE FREE 40 MG: 5 INJECTION INTRAVENOUS at 10:07

## 2024-04-21 RX ADMIN — IPRATROPIUM BROMIDE 0.5 MG: 0.5 SOLUTION RESPIRATORY (INHALATION) at 19:39

## 2024-04-21 RX ADMIN — INSULIN LISPRO 2 UNITS: 100 INJECTION, SOLUTION INTRAVENOUS; SUBCUTANEOUS at 06:24

## 2024-04-21 RX ADMIN — LEVETIRACETAM 500 MG: 100 INJECTION, SOLUTION INTRAVENOUS at 12:21

## 2024-04-21 RX ADMIN — ACETYLCYSTEINE 400 MG: 100 INHALANT RESPIRATORY (INHALATION) at 07:08

## 2024-04-21 RX ADMIN — PROPOFOL 35 MCG/KG/MIN: 10 INJECTION, EMULSION INTRAVENOUS at 22:24

## 2024-04-21 RX ADMIN — BUDESONIDE 250 MCG: 0.25 INHALANT RESPIRATORY (INHALATION) at 19:39

## 2024-04-21 RX ADMIN — PROPOFOL 30 MCG/KG/MIN: 10 INJECTION, EMULSION INTRAVENOUS at 00:48

## 2024-04-21 RX ADMIN — ACETAMINOPHEN 650 MG: 325 TABLET ORAL at 14:58

## 2024-04-21 RX ADMIN — FOLIC ACID 1 MG: 1 TABLET ORAL at 10:07

## 2024-04-21 RX ADMIN — MIDAZOLAM HYDROCHLORIDE 2 MG: 1 INJECTION, SOLUTION INTRAMUSCULAR; INTRAVENOUS at 07:48

## 2024-04-21 RX ADMIN — CHLORHEXIDINE GLUCONATE, 0.12% ORAL RINSE 15 ML: 1.2 SOLUTION DENTAL at 20:50

## 2024-04-21 RX ADMIN — POTASSIUM CHLORIDE 10 MEQ: 7.45 INJECTION INTRAVENOUS at 16:11

## 2024-04-21 RX ADMIN — ENOXAPARIN SODIUM 30 MG: 100 INJECTION SUBCUTANEOUS at 13:33

## 2024-04-21 RX ADMIN — ARFORMOTEROL TARTRATE 15 MCG: 15 SOLUTION RESPIRATORY (INHALATION) at 07:08

## 2024-04-21 RX ADMIN — PROPOFOL 30 MCG/KG/MIN: 10 INJECTION, EMULSION INTRAVENOUS at 09:37

## 2024-04-21 RX ADMIN — ARFORMOTEROL TARTRATE 15 MCG: 15 SOLUTION RESPIRATORY (INHALATION) at 19:38

## 2024-04-21 RX ADMIN — Medication 50 MCG/HR: at 10:09

## 2024-04-21 RX ADMIN — LEVETIRACETAM 500 MG: 100 INJECTION, SOLUTION INTRAVENOUS at 01:41

## 2024-04-21 RX ADMIN — PIPERACILLIN AND TAZOBACTAM 3375 MG: 3; .375 INJECTION, POWDER, LYOPHILIZED, FOR SOLUTION INTRAVENOUS at 03:47

## 2024-04-21 RX ADMIN — INSULIN GLARGINE 10 UNITS: 100 INJECTION, SOLUTION SUBCUTANEOUS at 21:13

## 2024-04-21 RX ADMIN — INSULIN LISPRO 2 UNITS: 100 INJECTION, SOLUTION INTRAVENOUS; SUBCUTANEOUS at 10:07

## 2024-04-21 RX ADMIN — ACETAMINOPHEN 650 MG: 325 TABLET ORAL at 04:08

## 2024-04-21 RX ADMIN — MIDAZOLAM HYDROCHLORIDE 2 MG: 1 INJECTION, SOLUTION INTRAMUSCULAR; INTRAVENOUS at 12:40

## 2024-04-21 RX ADMIN — POTASSIUM BICARBONATE 40 MEQ: 782 TABLET, EFFERVESCENT ORAL at 06:48

## 2024-04-21 RX ADMIN — PROPOFOL 30 MCG/KG/MIN: 10 INJECTION, EMULSION INTRAVENOUS at 05:01

## 2024-04-21 RX ADMIN — BUDESONIDE 250 MCG: 0.25 INHALANT RESPIRATORY (INHALATION) at 07:08

## 2024-04-21 ASSESSMENT — PULMONARY FUNCTION TESTS
PIF_VALUE: 21
PIF_VALUE: 21
PIF_VALUE: 24
PIF_VALUE: 25
PIF_VALUE: 19
PIF_VALUE: 22

## 2024-04-22 ENCOUNTER — APPOINTMENT (OUTPATIENT)
Facility: HOSPITAL | Age: 39
DRG: 207 | End: 2024-04-22
Attending: FAMILY MEDICINE
Payer: COMMERCIAL

## 2024-04-22 ENCOUNTER — APPOINTMENT (OUTPATIENT)
Facility: HOSPITAL | Age: 39
DRG: 207 | End: 2024-04-22
Payer: COMMERCIAL

## 2024-04-22 PROBLEM — G93.6 BRAIN EDEMA (HCC): Status: RESOLVED | Noted: 2024-04-18 | Resolved: 2024-04-22

## 2024-04-22 PROBLEM — G93.1 ANOXIC ENCEPHALOPATHY (HCC): Status: ACTIVE | Noted: 2024-04-22

## 2024-04-22 LAB
ANION GAP SERPL CALC-SCNC: 7 MMOL/L (ref 3–18)
ARTERIAL PATENCY WRIST A: POSITIVE
ARTERIAL PATENCY WRIST A: POSITIVE
BASE EXCESS BLD CALC-SCNC: 1.5 MMOL/L
BASE EXCESS BLD CALC-SCNC: 2.2 MMOL/L
BASOPHILS # BLD: 0.1 K/UL (ref 0–0.1)
BASOPHILS NFR BLD: 0 % (ref 0–2)
BDY SITE: ABNORMAL
BDY SITE: ABNORMAL
BUN SERPL-MCNC: 11 MG/DL (ref 7–18)
BUN/CREAT SERPL: 15 (ref 12–20)
CA-I SERPL-SCNC: 1.13 MMOL/L (ref 1.12–1.32)
CALCIUM SERPL-MCNC: 8.3 MG/DL (ref 8.5–10.1)
CHLORIDE SERPL-SCNC: 100 MMOL/L (ref 100–111)
CO2 SERPL-SCNC: 28 MMOL/L (ref 21–32)
CREAT SERPL-MCNC: 0.71 MG/DL (ref 0.6–1.3)
DIFFERENTIAL METHOD BLD: ABNORMAL
EOSINOPHIL # BLD: 0.5 K/UL (ref 0–0.4)
EOSINOPHIL NFR BLD: 4 % (ref 0–5)
ERYTHROCYTE [DISTWIDTH] IN BLOOD BY AUTOMATED COUNT: 13.2 % (ref 11.6–14.5)
GAS FLOW.O2 O2 DELIVERY SYS: ABNORMAL
GAS FLOW.O2 O2 DELIVERY SYS: ABNORMAL
GAS FLOW.O2 SETTING OXYMISER: 18 BPM
GAS FLOW.O2 SETTING OXYMISER: 20 BPM
GLUCOSE BLD STRIP.AUTO-MCNC: 185 MG/DL (ref 70–110)
GLUCOSE BLD STRIP.AUTO-MCNC: 206 MG/DL (ref 70–110)
GLUCOSE BLD STRIP.AUTO-MCNC: 207 MG/DL (ref 70–110)
GLUCOSE BLD STRIP.AUTO-MCNC: 230 MG/DL (ref 70–110)
GLUCOSE BLD STRIP.AUTO-MCNC: 243 MG/DL (ref 70–110)
GLUCOSE SERPL-MCNC: 222 MG/DL (ref 74–99)
HCO3 BLD-SCNC: 26.1 MMOL/L (ref 22–26)
HCO3 BLD-SCNC: 27.3 MMOL/L (ref 22–26)
HCT VFR BLD AUTO: 29.9 % (ref 36–48)
HGB BLD-MCNC: 10.2 G/DL (ref 13–16)
IMM GRANULOCYTES # BLD AUTO: 0.2 K/UL (ref 0–0.04)
IMM GRANULOCYTES NFR BLD AUTO: 1 % (ref 0–0.5)
LYMPHOCYTES # BLD: 1.2 K/UL (ref 0.9–3.6)
LYMPHOCYTES NFR BLD: 10 % (ref 21–52)
MCH RBC QN AUTO: 32.3 PG (ref 24–34)
MCHC RBC AUTO-ENTMCNC: 34.1 G/DL (ref 31–37)
MCV RBC AUTO: 94.6 FL (ref 78–100)
MONOCYTES # BLD: 1.4 K/UL (ref 0.05–1.2)
MONOCYTES NFR BLD: 12 % (ref 3–10)
NEUTS SEG # BLD: 8.7 K/UL (ref 1.8–8)
NEUTS SEG NFR BLD: 73 % (ref 40–73)
NRBC # BLD: 0 K/UL (ref 0–0.01)
NRBC BLD-RTO: 0 PER 100 WBC
O2/TOTAL GAS SETTING VFR VENT: 45 %
O2/TOTAL GAS SETTING VFR VENT: 55 %
PCO2 BLD: 40.3 MMHG (ref 35–45)
PCO2 BLD: 43.7 MMHG (ref 35–45)
PEEP RESPIRATORY: 5 CMH2O
PEEP RESPIRATORY: 5 CMH2O
PH BLD: 7.4 (ref 7.35–7.45)
PH BLD: 7.42 (ref 7.35–7.45)
PLATELET # BLD AUTO: 247 K/UL (ref 135–420)
PMV BLD AUTO: 10 FL (ref 9.2–11.8)
PO2 BLD: 106 MMHG (ref 80–100)
PO2 BLD: 93 MMHG (ref 80–100)
POTASSIUM SERPL-SCNC: 3.8 MMOL/L (ref 3.5–5.5)
RBC # BLD AUTO: 3.16 M/UL (ref 4.35–5.65)
SAO2 % BLD: 97.2 % (ref 92–97)
SAO2 % BLD: 98.2 % (ref 92–97)
SERVICE CMNT-IMP: ABNORMAL
SERVICE CMNT-IMP: ABNORMAL
SODIUM SERPL-SCNC: 135 MMOL/L (ref 136–145)
SPECIMEN TYPE: ABNORMAL
SPECIMEN TYPE: ABNORMAL
VENTILATION MODE VENT: ABNORMAL
VENTILATION MODE VENT: ABNORMAL
VT SETTING VENT: 500 ML
VT SETTING VENT: 500 ML
WBC # BLD AUTO: 12 K/UL (ref 4.6–13.2)

## 2024-04-22 PROCEDURE — C9113 INJ PANTOPRAZOLE SODIUM, VIA: HCPCS | Performed by: HOSPITALIST

## 2024-04-22 PROCEDURE — 2580000003 HC RX 258: Performed by: HOSPITALIST

## 2024-04-22 PROCEDURE — 2500000003 HC RX 250 WO HCPCS: Performed by: INTERNAL MEDICINE

## 2024-04-22 PROCEDURE — 6360000002 HC RX W HCPCS: Performed by: INTERNAL MEDICINE

## 2024-04-22 PROCEDURE — 6360000002 HC RX W HCPCS: Performed by: HOSPITALIST

## 2024-04-22 PROCEDURE — 82962 GLUCOSE BLOOD TEST: CPT

## 2024-04-22 PROCEDURE — 94003 VENT MGMT INPAT SUBQ DAY: CPT

## 2024-04-22 PROCEDURE — 93971 EXTREMITY STUDY: CPT

## 2024-04-22 PROCEDURE — 2700000000 HC OXYGEN THERAPY PER DAY

## 2024-04-22 PROCEDURE — 6370000000 HC RX 637 (ALT 250 FOR IP): Performed by: FAMILY MEDICINE

## 2024-04-22 PROCEDURE — 80048 BASIC METABOLIC PNL TOTAL CA: CPT

## 2024-04-22 PROCEDURE — 71045 X-RAY EXAM CHEST 1 VIEW: CPT

## 2024-04-22 PROCEDURE — 82330 ASSAY OF CALCIUM: CPT

## 2024-04-22 PROCEDURE — 6360000002 HC RX W HCPCS: Performed by: FAMILY MEDICINE

## 2024-04-22 PROCEDURE — 6370000000 HC RX 637 (ALT 250 FOR IP): Performed by: HOSPITALIST

## 2024-04-22 PROCEDURE — 36600 WITHDRAWAL OF ARTERIAL BLOOD: CPT

## 2024-04-22 PROCEDURE — 2580000003 HC RX 258: Performed by: EMERGENCY MEDICINE

## 2024-04-22 PROCEDURE — 94640 AIRWAY INHALATION TREATMENT: CPT

## 2024-04-22 PROCEDURE — 2000000000 HC ICU R&B

## 2024-04-22 PROCEDURE — 85025 COMPLETE CBC W/AUTO DIFF WBC: CPT

## 2024-04-22 PROCEDURE — 6370000000 HC RX 637 (ALT 250 FOR IP): Performed by: INTERNAL MEDICINE

## 2024-04-22 PROCEDURE — 6360000002 HC RX W HCPCS: Performed by: EMERGENCY MEDICINE

## 2024-04-22 PROCEDURE — 2580000003 HC RX 258: Performed by: INTERNAL MEDICINE

## 2024-04-22 PROCEDURE — A4216 STERILE WATER/SALINE, 10 ML: HCPCS | Performed by: HOSPITALIST

## 2024-04-22 RX ORDER — FUROSEMIDE 10 MG/ML
20 INJECTION INTRAMUSCULAR; INTRAVENOUS ONCE
Status: COMPLETED | OUTPATIENT
Start: 2024-04-22 | End: 2024-04-22

## 2024-04-22 RX ORDER — INSULIN LISPRO 100 [IU]/ML
0-8 INJECTION, SOLUTION INTRAVENOUS; SUBCUTANEOUS EVERY 6 HOURS
Status: DISCONTINUED | OUTPATIENT
Start: 2024-04-22 | End: 2024-05-09 | Stop reason: HOSPADM

## 2024-04-22 RX ADMIN — CHLORHEXIDINE GLUCONATE, 0.12% ORAL RINSE 15 ML: 1.2 SOLUTION DENTAL at 20:43

## 2024-04-22 RX ADMIN — BUDESONIDE 250 MCG: 0.25 INHALANT RESPIRATORY (INHALATION) at 18:09

## 2024-04-22 RX ADMIN — DOXYCYCLINE 100 MG: 100 INJECTION, POWDER, LYOPHILIZED, FOR SOLUTION INTRAVENOUS at 17:39

## 2024-04-22 RX ADMIN — MIDAZOLAM HYDROCHLORIDE 2 MG: 1 INJECTION, SOLUTION INTRAMUSCULAR; INTRAVENOUS at 14:01

## 2024-04-22 RX ADMIN — INSULIN LISPRO 2 UNITS: 100 INJECTION, SOLUTION INTRAVENOUS; SUBCUTANEOUS at 05:35

## 2024-04-22 RX ADMIN — LEVETIRACETAM 500 MG: 100 INJECTION, SOLUTION INTRAVENOUS at 01:03

## 2024-04-22 RX ADMIN — PROPOFOL 35 MCG/KG/MIN: 10 INJECTION, EMULSION INTRAVENOUS at 22:35

## 2024-04-22 RX ADMIN — PROPOFOL 35 MCG/KG/MIN: 10 INJECTION, EMULSION INTRAVENOUS at 02:23

## 2024-04-22 RX ADMIN — SODIUM CHLORIDE, PRESERVATIVE FREE 10 ML: 5 INJECTION INTRAVENOUS at 08:37

## 2024-04-22 RX ADMIN — PIPERACILLIN AND TAZOBACTAM 3375 MG: 3; .375 INJECTION, POWDER, LYOPHILIZED, FOR SOLUTION INTRAVENOUS at 03:18

## 2024-04-22 RX ADMIN — ARFORMOTEROL TARTRATE 15 MCG: 15 SOLUTION RESPIRATORY (INHALATION) at 07:57

## 2024-04-22 RX ADMIN — IPRATROPIUM BROMIDE 0.5 MG: 0.5 SOLUTION RESPIRATORY (INHALATION) at 07:57

## 2024-04-22 RX ADMIN — MIDAZOLAM HYDROCHLORIDE 2 MG: 1 INJECTION, SOLUTION INTRAMUSCULAR; INTRAVENOUS at 23:55

## 2024-04-22 RX ADMIN — SODIUM CHLORIDE, PRESERVATIVE FREE 40 MG: 5 INJECTION INTRAVENOUS at 08:37

## 2024-04-22 RX ADMIN — PIPERACILLIN AND TAZOBACTAM 3375 MG: 3; .375 INJECTION, POWDER, LYOPHILIZED, FOR SOLUTION INTRAVENOUS at 20:43

## 2024-04-22 RX ADMIN — INSULIN GLARGINE 10 UNITS: 100 INJECTION, SOLUTION SUBCUTANEOUS at 21:14

## 2024-04-22 RX ADMIN — DOXYCYCLINE 100 MG: 100 INJECTION, POWDER, LYOPHILIZED, FOR SOLUTION INTRAVENOUS at 05:15

## 2024-04-22 RX ADMIN — SODIUM CHLORIDE, PRESERVATIVE FREE 10 ML: 5 INJECTION INTRAVENOUS at 20:45

## 2024-04-22 RX ADMIN — INSULIN GLARGINE 10 UNITS: 100 INJECTION, SOLUTION SUBCUTANEOUS at 08:36

## 2024-04-22 RX ADMIN — ENOXAPARIN SODIUM 30 MG: 100 INJECTION SUBCUTANEOUS at 01:11

## 2024-04-22 RX ADMIN — THIAMINE HCL TAB 100 MG 100 MG: 100 TAB at 08:37

## 2024-04-22 RX ADMIN — Medication 2 UNITS: at 11:58

## 2024-04-22 RX ADMIN — ARFORMOTEROL TARTRATE 15 MCG: 15 SOLUTION RESPIRATORY (INHALATION) at 18:09

## 2024-04-22 RX ADMIN — PROPOFOL 20 MCG/KG/MIN: 10 INJECTION, EMULSION INTRAVENOUS at 16:24

## 2024-04-22 RX ADMIN — FUROSEMIDE 20 MG: 10 INJECTION, SOLUTION INTRAMUSCULAR; INTRAVENOUS at 03:18

## 2024-04-22 RX ADMIN — MIDAZOLAM HYDROCHLORIDE 2 MG: 1 INJECTION, SOLUTION INTRAMUSCULAR; INTRAVENOUS at 20:43

## 2024-04-22 RX ADMIN — PIPERACILLIN AND TAZOBACTAM 3375 MG: 3; .375 INJECTION, POWDER, LYOPHILIZED, FOR SOLUTION INTRAVENOUS at 12:01

## 2024-04-22 RX ADMIN — IPRATROPIUM BROMIDE 0.5 MG: 0.5 SOLUTION RESPIRATORY (INHALATION) at 18:09

## 2024-04-22 RX ADMIN — Medication 75 MCG/HR: at 00:24

## 2024-04-22 RX ADMIN — PROPOFOL 35 MCG/KG/MIN: 10 INJECTION, EMULSION INTRAVENOUS at 06:10

## 2024-04-22 RX ADMIN — Medication 2 UNITS: at 17:39

## 2024-04-22 RX ADMIN — ENOXAPARIN SODIUM 30 MG: 100 INJECTION SUBCUTANEOUS at 13:37

## 2024-04-22 RX ADMIN — LEVETIRACETAM 500 MG: 100 INJECTION, SOLUTION INTRAVENOUS at 13:37

## 2024-04-22 RX ADMIN — BUDESONIDE 250 MCG: 0.25 INHALANT RESPIRATORY (INHALATION) at 07:57

## 2024-04-22 RX ADMIN — CHLORHEXIDINE GLUCONATE, 0.12% ORAL RINSE 15 ML: 1.2 SOLUTION DENTAL at 08:37

## 2024-04-22 RX ADMIN — FOLIC ACID 1 MG: 1 TABLET ORAL at 08:37

## 2024-04-22 RX ADMIN — IPRATROPIUM BROMIDE 0.5 MG: 0.5 SOLUTION RESPIRATORY (INHALATION) at 15:17

## 2024-04-22 RX ADMIN — MIDAZOLAM HYDROCHLORIDE 2 MG: 1 INJECTION, SOLUTION INTRAMUSCULAR; INTRAVENOUS at 01:03

## 2024-04-22 ASSESSMENT — PAIN SCALES - GENERAL
PAINLEVEL_OUTOF10: 0

## 2024-04-22 ASSESSMENT — PULMONARY FUNCTION TESTS
PIF_VALUE: 20
PIF_VALUE: 21
PIF_VALUE: 18
PIF_VALUE: 18
PIF_VALUE: 22

## 2024-04-23 ENCOUNTER — APPOINTMENT (OUTPATIENT)
Facility: HOSPITAL | Age: 39
DRG: 207 | End: 2024-04-23
Payer: COMMERCIAL

## 2024-04-23 LAB
ANION GAP SERPL CALC-SCNC: 7 MMOL/L (ref 3–18)
ARTERIAL PATENCY WRIST A: POSITIVE
BASE EXCESS BLD CALC-SCNC: 4.5 MMOL/L
BASOPHILS # BLD: 0.1 K/UL (ref 0–0.1)
BASOPHILS NFR BLD: 1 % (ref 0–2)
BDY SITE: ABNORMAL
BUN SERPL-MCNC: 15 MG/DL (ref 7–18)
CA-I SERPL-SCNC: 1.21 MMOL/L (ref 1.12–1.32)
CALCIUM SERPL-MCNC: 9 MG/DL (ref 8.5–10.1)
CHLORIDE SERPL-SCNC: 101 MMOL/L (ref 100–111)
CO2 SERPL-SCNC: 29 MMOL/L (ref 21–32)
CREAT SERPL-MCNC: 0.72 MG/DL (ref 0.6–1.3)
DIFFERENTIAL METHOD BLD: ABNORMAL
ECHO BSA: 2.54 M2
EOSINOPHIL # BLD: 0.5 K/UL (ref 0–0.4)
EOSINOPHIL NFR BLD: 5 % (ref 0–5)
ERYTHROCYTE [DISTWIDTH] IN BLOOD BY AUTOMATED COUNT: 13.1 % (ref 11.6–14.5)
GAS FLOW.O2 O2 DELIVERY SYS: ABNORMAL
GAS FLOW.O2 SETTING OXYMISER: 21 BPM
GLUCOSE BLD STRIP.AUTO-MCNC: 213 MG/DL (ref 70–110)
GLUCOSE BLD STRIP.AUTO-MCNC: 223 MG/DL (ref 70–110)
GLUCOSE BLD STRIP.AUTO-MCNC: 236 MG/DL (ref 70–110)
GLUCOSE BLD STRIP.AUTO-MCNC: 251 MG/DL (ref 70–110)
GLUCOSE SERPL-MCNC: 265 MG/DL (ref 74–99)
HCO3 BLD-SCNC: 29.6 MMOL/L (ref 22–26)
HCT VFR BLD AUTO: 30.5 % (ref 36–48)
HGB BLD-MCNC: 10.1 G/DL (ref 13–16)
IMM GRANULOCYTES # BLD AUTO: 0.2 K/UL (ref 0–0.04)
IMM GRANULOCYTES NFR BLD AUTO: 3 % (ref 0–0.5)
IPAP/PIP/HIGH PEEP: 22
LYMPHOCYTES # BLD: 1.6 K/UL (ref 0.9–3.6)
LYMPHOCYTES NFR BLD: 17 % (ref 21–52)
MAGNESIUM SERPL-MCNC: 1.8 MG/DL (ref 1.6–2.6)
MCH RBC QN AUTO: 30.9 PG (ref 24–34)
MCHC RBC AUTO-ENTMCNC: 33.1 G/DL (ref 31–37)
MCV RBC AUTO: 93.3 FL (ref 78–100)
MONOCYTES # BLD: 1.2 K/UL (ref 0.05–1.2)
MONOCYTES NFR BLD: 13 % (ref 3–10)
NEUTS SEG # BLD: 5.9 K/UL (ref 1.8–8)
NEUTS SEG NFR BLD: 62 % (ref 40–73)
NRBC # BLD: 0 K/UL (ref 0–0.01)
NRBC BLD-RTO: 0 PER 100 WBC
O2/TOTAL GAS SETTING VFR VENT: 70 %
PAW @ MEAN EXP FLOW ON VENT: 12 CMH2O
PCO2 BLD: 45.3 MMHG (ref 35–45)
PEEP RESPIRATORY: 5 CMH2O
PH BLD: 7.42 (ref 7.35–7.45)
PLATELET # BLD AUTO: 284 K/UL (ref 135–420)
PMV BLD AUTO: 9.8 FL (ref 9.2–11.8)
PO2 BLD: 72 MMHG (ref 80–100)
POTASSIUM SERPL-SCNC: 3.6 MMOL/L (ref 3.5–5.5)
RBC # BLD AUTO: 3.27 M/UL (ref 4.35–5.65)
SAO2 % BLD: 94.4 % (ref 92–97)
SERVICE CMNT-IMP: ABNORMAL
SODIUM SERPL-SCNC: 137 MMOL/L (ref 136–145)
SPECIMEN TYPE: ABNORMAL
VENTILATION MODE VENT: ABNORMAL
VT SETTING VENT: 500 ML
WBC # BLD AUTO: 9.6 K/UL (ref 4.6–13.2)

## 2024-04-23 PROCEDURE — 80048 BASIC METABOLIC PNL TOTAL CA: CPT

## 2024-04-23 PROCEDURE — 94640 AIRWAY INHALATION TREATMENT: CPT

## 2024-04-23 PROCEDURE — 2500000003 HC RX 250 WO HCPCS: Performed by: INTERNAL MEDICINE

## 2024-04-23 PROCEDURE — 6360000002 HC RX W HCPCS: Performed by: INTERNAL MEDICINE

## 2024-04-23 PROCEDURE — 74018 RADEX ABDOMEN 1 VIEW: CPT

## 2024-04-23 PROCEDURE — 6360000002 HC RX W HCPCS: Performed by: HOSPITALIST

## 2024-04-23 PROCEDURE — 2700000000 HC OXYGEN THERAPY PER DAY

## 2024-04-23 PROCEDURE — 82803 BLOOD GASES ANY COMBINATION: CPT

## 2024-04-23 PROCEDURE — C9113 INJ PANTOPRAZOLE SODIUM, VIA: HCPCS | Performed by: HOSPITALIST

## 2024-04-23 PROCEDURE — 94003 VENT MGMT INPAT SUBQ DAY: CPT

## 2024-04-23 PROCEDURE — 2580000003 HC RX 258: Performed by: INTERNAL MEDICINE

## 2024-04-23 PROCEDURE — 85025 COMPLETE CBC W/AUTO DIFF WBC: CPT

## 2024-04-23 PROCEDURE — 6370000000 HC RX 637 (ALT 250 FOR IP): Performed by: INTERNAL MEDICINE

## 2024-04-23 PROCEDURE — 36600 WITHDRAWAL OF ARTERIAL BLOOD: CPT

## 2024-04-23 PROCEDURE — 82330 ASSAY OF CALCIUM: CPT

## 2024-04-23 PROCEDURE — 6370000000 HC RX 637 (ALT 250 FOR IP): Performed by: FAMILY MEDICINE

## 2024-04-23 PROCEDURE — 83735 ASSAY OF MAGNESIUM: CPT

## 2024-04-23 PROCEDURE — 6360000002 HC RX W HCPCS: Performed by: EMERGENCY MEDICINE

## 2024-04-23 PROCEDURE — 2000000000 HC ICU R&B

## 2024-04-23 PROCEDURE — 99232 SBSQ HOSP IP/OBS MODERATE 35: CPT | Performed by: NURSE PRACTITIONER

## 2024-04-23 PROCEDURE — A4216 STERILE WATER/SALINE, 10 ML: HCPCS | Performed by: HOSPITALIST

## 2024-04-23 PROCEDURE — 2580000003 HC RX 258: Performed by: EMERGENCY MEDICINE

## 2024-04-23 PROCEDURE — 94667 MNPJ CHEST WALL 1ST: CPT

## 2024-04-23 PROCEDURE — 2580000003 HC RX 258: Performed by: HOSPITALIST

## 2024-04-23 PROCEDURE — 6360000002 HC RX W HCPCS: Performed by: FAMILY MEDICINE

## 2024-04-23 PROCEDURE — 82962 GLUCOSE BLOOD TEST: CPT

## 2024-04-23 PROCEDURE — 71045 X-RAY EXAM CHEST 1 VIEW: CPT

## 2024-04-23 RX ORDER — FENTANYL CITRATE-0.9 % NACL/PF 10 MCG/ML
25-200 PLASTIC BAG, INJECTION (ML) INTRAVENOUS CONTINUOUS
Status: DISCONTINUED | OUTPATIENT
Start: 2024-04-23 | End: 2024-04-24

## 2024-04-23 RX ORDER — INSULIN GLARGINE 100 [IU]/ML
14 INJECTION, SOLUTION SUBCUTANEOUS 2 TIMES DAILY
Status: DISCONTINUED | OUTPATIENT
Start: 2024-04-23 | End: 2024-04-26

## 2024-04-23 RX ORDER — INSULIN GLARGINE 100 [IU]/ML
14 INJECTION, SOLUTION SUBCUTANEOUS 2 TIMES DAILY
Status: DISCONTINUED | OUTPATIENT
Start: 2024-04-23 | End: 2024-04-23

## 2024-04-23 RX ORDER — PROPOFOL 10 MG/ML
5-50 INJECTION, EMULSION INTRAVENOUS CONTINUOUS
Status: DISCONTINUED | OUTPATIENT
Start: 2024-04-23 | End: 2024-04-25

## 2024-04-23 RX ORDER — MIDAZOLAM (PF) 1 MG/ML IN 0.9 % SODIUM CHLORIDE INTRAVENOUS SOLUTION
1-10 CONTINUOUS
Status: DISCONTINUED | OUTPATIENT
Start: 2024-04-23 | End: 2024-04-24

## 2024-04-23 RX ADMIN — PIPERACILLIN AND TAZOBACTAM 3375 MG: 3; .375 INJECTION, POWDER, LYOPHILIZED, FOR SOLUTION INTRAVENOUS at 22:05

## 2024-04-23 RX ADMIN — MIDAZOLAM HYDROCHLORIDE 2 MG: 1 INJECTION, SOLUTION INTRAMUSCULAR; INTRAVENOUS at 15:02

## 2024-04-23 RX ADMIN — PROPOFOL 25 MCG/KG/MIN: 10 INJECTION, EMULSION INTRAVENOUS at 23:03

## 2024-04-23 RX ADMIN — ENOXAPARIN SODIUM 30 MG: 100 INJECTION SUBCUTANEOUS at 01:16

## 2024-04-23 RX ADMIN — PIPERACILLIN AND TAZOBACTAM 3375 MG: 3; .375 INJECTION, POWDER, LYOPHILIZED, FOR SOLUTION INTRAVENOUS at 06:27

## 2024-04-23 RX ADMIN — PROPOFOL 35 MCG/KG/MIN: 10 INJECTION, EMULSION INTRAVENOUS at 02:18

## 2024-04-23 RX ADMIN — LEVETIRACETAM 500 MG: 100 INJECTION, SOLUTION INTRAVENOUS at 00:28

## 2024-04-23 RX ADMIN — PROPOFOL 35 MCG/KG/MIN: 10 INJECTION, EMULSION INTRAVENOUS at 06:11

## 2024-04-23 RX ADMIN — DOXYCYCLINE 100 MG: 100 INJECTION, POWDER, LYOPHILIZED, FOR SOLUTION INTRAVENOUS at 20:56

## 2024-04-23 RX ADMIN — ENOXAPARIN SODIUM 30 MG: 100 INJECTION SUBCUTANEOUS at 14:33

## 2024-04-23 RX ADMIN — PROPOFOL 10 MCG/KG/MIN: 10 INJECTION, EMULSION INTRAVENOUS at 16:31

## 2024-04-23 RX ADMIN — IPRATROPIUM BROMIDE 0.5 MG: 0.5 SOLUTION RESPIRATORY (INHALATION) at 14:40

## 2024-04-23 RX ADMIN — CHLORHEXIDINE GLUCONATE, 0.12% ORAL RINSE 15 ML: 1.2 SOLUTION DENTAL at 22:49

## 2024-04-23 RX ADMIN — INSULIN GLARGINE 14 UNITS: 100 INJECTION, SOLUTION SUBCUTANEOUS at 10:59

## 2024-04-23 RX ADMIN — SODIUM CHLORIDE, PRESERVATIVE FREE 40 MG: 5 INJECTION INTRAVENOUS at 10:59

## 2024-04-23 RX ADMIN — INSULIN GLARGINE 14 UNITS: 100 INJECTION, SOLUTION SUBCUTANEOUS at 22:49

## 2024-04-23 RX ADMIN — SODIUM CHLORIDE, PRESERVATIVE FREE 10 ML: 5 INJECTION INTRAVENOUS at 20:58

## 2024-04-23 RX ADMIN — Medication 50 MCG/HR: at 02:56

## 2024-04-23 RX ADMIN — ARFORMOTEROL TARTRATE 15 MCG: 15 SOLUTION RESPIRATORY (INHALATION) at 20:20

## 2024-04-23 RX ADMIN — BUDESONIDE 250 MCG: 0.25 INHALANT RESPIRATORY (INHALATION) at 07:13

## 2024-04-23 RX ADMIN — Medication 2 UNITS: at 00:40

## 2024-04-23 RX ADMIN — IPRATROPIUM BROMIDE 0.5 MG: 0.5 SOLUTION RESPIRATORY (INHALATION) at 20:20

## 2024-04-23 RX ADMIN — MIDAZOLAM HYDROCHLORIDE 2 MG: 1 INJECTION, SOLUTION INTRAMUSCULAR; INTRAVENOUS at 18:12

## 2024-04-23 RX ADMIN — Medication 75 MCG/HR: at 22:05

## 2024-04-23 RX ADMIN — IPRATROPIUM BROMIDE 0.5 MG: 0.5 SOLUTION RESPIRATORY (INHALATION) at 07:13

## 2024-04-23 RX ADMIN — BUDESONIDE 250 MCG: 0.25 INHALANT RESPIRATORY (INHALATION) at 20:20

## 2024-04-23 RX ADMIN — Medication 4 UNITS: at 11:38

## 2024-04-23 RX ADMIN — DOXYCYCLINE 100 MG: 100 INJECTION, POWDER, LYOPHILIZED, FOR SOLUTION INTRAVENOUS at 06:27

## 2024-04-23 RX ADMIN — Medication 2 UNITS: at 17:56

## 2024-04-23 RX ADMIN — PROPOFOL 35 MCG/KG/MIN: 10 INJECTION, EMULSION INTRAVENOUS at 09:59

## 2024-04-23 RX ADMIN — LEVETIRACETAM 500 MG: 100 INJECTION, SOLUTION INTRAVENOUS at 14:20

## 2024-04-23 RX ADMIN — MIDAZOLAM HYDROCHLORIDE 2 MG: 1 INJECTION, SOLUTION INTRAMUSCULAR; INTRAVENOUS at 02:50

## 2024-04-23 RX ADMIN — MIDAZOLAM HYDROCHLORIDE 2 MG: 1 INJECTION, SOLUTION INTRAMUSCULAR; INTRAVENOUS at 19:57

## 2024-04-23 RX ADMIN — ARFORMOTEROL TARTRATE 15 MCG: 15 SOLUTION RESPIRATORY (INHALATION) at 07:13

## 2024-04-23 RX ADMIN — PIPERACILLIN AND TAZOBACTAM 3375 MG: 3; .375 INJECTION, POWDER, LYOPHILIZED, FOR SOLUTION INTRAVENOUS at 11:39

## 2024-04-23 RX ADMIN — MIDAZOLAM HYDROCHLORIDE 2 MG: 1 INJECTION, SOLUTION INTRAMUSCULAR; INTRAVENOUS at 08:50

## 2024-04-23 RX ADMIN — Medication 2 UNITS: at 06:27

## 2024-04-23 RX ADMIN — Medication 2 MG/HR: at 20:37

## 2024-04-23 ASSESSMENT — PULMONARY FUNCTION TESTS
PIF_VALUE: 19
PIF_VALUE: 19
PIF_VALUE: 20
PIF_VALUE: 21
PIF_VALUE: 15
PIF_VALUE: 22

## 2024-04-23 ASSESSMENT — PAIN SCALES - GENERAL: PAINLEVEL_OUTOF10: 0

## 2024-04-24 ENCOUNTER — APPOINTMENT (OUTPATIENT)
Facility: HOSPITAL | Age: 39
DRG: 207 | End: 2024-04-24
Payer: COMMERCIAL

## 2024-04-24 LAB
ALBUMIN SERPL-MCNC: 2.2 G/DL (ref 3.4–5)
ALBUMIN/GLOB SERPL: 0.5 (ref 0.8–1.7)
ALP SERPL-CCNC: 76 U/L (ref 45–117)
ALT SERPL-CCNC: 29 U/L (ref 16–61)
ANION GAP SERPL CALC-SCNC: 8 MMOL/L (ref 3–18)
ARTERIAL PATENCY WRIST A: POSITIVE
AST SERPL-CCNC: 42 U/L (ref 10–38)
BASE EXCESS BLD CALC-SCNC: 2.7 MMOL/L
BASOPHILS # BLD: 0 K/UL (ref 0–0.1)
BASOPHILS NFR BLD: 0 % (ref 0–2)
BDY SITE: ABNORMAL
BILIRUB DIRECT SERPL-MCNC: 0.1 MG/DL (ref 0–0.2)
BILIRUB SERPL-MCNC: 0.3 MG/DL (ref 0.2–1)
BUN SERPL-MCNC: 13 MG/DL (ref 7–18)
BUN/CREAT SERPL: 19 (ref 12–20)
CA-I SERPL-SCNC: 1.23 MMOL/L (ref 1.12–1.32)
CALCIUM SERPL-MCNC: 9.4 MG/DL (ref 8.5–10.1)
CHLORIDE SERPL-SCNC: 104 MMOL/L (ref 100–111)
CO2 SERPL-SCNC: 27 MMOL/L (ref 21–32)
CREAT SERPL-MCNC: 0.68 MG/DL (ref 0.6–1.3)
DIFFERENTIAL METHOD BLD: ABNORMAL
EOSINOPHIL # BLD: 0.5 K/UL (ref 0–0.4)
EOSINOPHIL NFR BLD: 5 % (ref 0–5)
ERYTHROCYTE [DISTWIDTH] IN BLOOD BY AUTOMATED COUNT: 13 % (ref 11.6–14.5)
GAS FLOW.O2 O2 DELIVERY SYS: ABNORMAL
GAS FLOW.O2 SETTING OXYMISER: 15 BPM
GLOBULIN SER CALC-MCNC: 4.7 G/DL (ref 2–4)
GLUCOSE BLD STRIP.AUTO-MCNC: 195 MG/DL (ref 70–110)
GLUCOSE BLD STRIP.AUTO-MCNC: 202 MG/DL (ref 70–110)
GLUCOSE BLD STRIP.AUTO-MCNC: 203 MG/DL (ref 70–110)
GLUCOSE BLD STRIP.AUTO-MCNC: 209 MG/DL (ref 70–110)
GLUCOSE BLD STRIP.AUTO-MCNC: 232 MG/DL (ref 70–110)
GLUCOSE BLD STRIP.AUTO-MCNC: 238 MG/DL (ref 70–110)
GLUCOSE SERPL-MCNC: 239 MG/DL (ref 74–99)
HCO3 BLD-SCNC: 27.8 MMOL/L (ref 22–26)
HCT VFR BLD AUTO: 30.8 % (ref 36–48)
HGB BLD-MCNC: 10.3 G/DL (ref 13–16)
IMM GRANULOCYTES # BLD AUTO: 0.3 K/UL (ref 0–0.04)
IMM GRANULOCYTES NFR BLD AUTO: 3 % (ref 0–0.5)
LYMPHOCYTES # BLD: 1.5 K/UL (ref 0.9–3.6)
LYMPHOCYTES NFR BLD: 15 % (ref 21–52)
MAGNESIUM SERPL-MCNC: 1.7 MG/DL (ref 1.6–2.6)
MCH RBC QN AUTO: 31.1 PG (ref 24–34)
MCHC RBC AUTO-ENTMCNC: 33.4 G/DL (ref 31–37)
MCV RBC AUTO: 93.1 FL (ref 78–100)
MONOCYTES # BLD: 1.2 K/UL (ref 0.05–1.2)
MONOCYTES NFR BLD: 11 % (ref 3–10)
NEUTS SEG # BLD: 6.8 K/UL (ref 1.8–8)
NEUTS SEG NFR BLD: 66 % (ref 40–73)
NRBC # BLD: 0 K/UL (ref 0–0.01)
NRBC BLD-RTO: 0 PER 100 WBC
O2/TOTAL GAS SETTING VFR VENT: 50 %
PCO2 BLD: 44.1 MMHG (ref 35–45)
PEEP RESPIRATORY: 6 CMH2O
PH BLD: 7.41 (ref 7.35–7.45)
PHOSPHATE SERPL-MCNC: 2.7 MG/DL (ref 2.5–4.9)
PLATELET # BLD AUTO: 337 K/UL (ref 135–420)
PMV BLD AUTO: 9.7 FL (ref 9.2–11.8)
PO2 BLD: 87 MMHG (ref 80–100)
POTASSIUM SERPL-SCNC: 3.5 MMOL/L (ref 3.5–5.5)
POTASSIUM SERPL-SCNC: 3.7 MMOL/L (ref 3.5–5.5)
PROT SERPL-MCNC: 6.9 G/DL (ref 6.4–8.2)
RBC # BLD AUTO: 3.31 M/UL (ref 4.35–5.65)
SAO2 % BLD: 96.6 % (ref 92–97)
SERVICE CMNT-IMP: ABNORMAL
SODIUM SERPL-SCNC: 139 MMOL/L (ref 136–145)
SPECIMEN TYPE: ABNORMAL
VENTILATION MODE VENT: ABNORMAL
VT SETTING VENT: 520 ML
WBC # BLD AUTO: 10.3 K/UL (ref 4.6–13.2)

## 2024-04-24 PROCEDURE — 87077 CULTURE AEROBIC IDENTIFY: CPT

## 2024-04-24 PROCEDURE — 3600007512: Performed by: INTERNAL MEDICINE

## 2024-04-24 PROCEDURE — 6360000002 HC RX W HCPCS: Performed by: EMERGENCY MEDICINE

## 2024-04-24 PROCEDURE — 94640 AIRWAY INHALATION TREATMENT: CPT

## 2024-04-24 PROCEDURE — 82330 ASSAY OF CALCIUM: CPT

## 2024-04-24 PROCEDURE — 85025 COMPLETE CBC W/AUTO DIFF WBC: CPT

## 2024-04-24 PROCEDURE — 6360000002 HC RX W HCPCS: Performed by: INTERNAL MEDICINE

## 2024-04-24 PROCEDURE — 87186 SC STD MICRODIL/AGAR DIL: CPT

## 2024-04-24 PROCEDURE — 6370000000 HC RX 637 (ALT 250 FOR IP): Performed by: INTERNAL MEDICINE

## 2024-04-24 PROCEDURE — 2580000003 HC RX 258: Performed by: HOSPITALIST

## 2024-04-24 PROCEDURE — 2500000003 HC RX 250 WO HCPCS: Performed by: INTERNAL MEDICINE

## 2024-04-24 PROCEDURE — 3600007502: Performed by: INTERNAL MEDICINE

## 2024-04-24 PROCEDURE — 80076 HEPATIC FUNCTION PANEL: CPT

## 2024-04-24 PROCEDURE — 6370000000 HC RX 637 (ALT 250 FOR IP): Performed by: FAMILY MEDICINE

## 2024-04-24 PROCEDURE — 82803 BLOOD GASES ANY COMBINATION: CPT

## 2024-04-24 PROCEDURE — 84100 ASSAY OF PHOSPHORUS: CPT

## 2024-04-24 PROCEDURE — 99152 MOD SED SAME PHYS/QHP 5/>YRS: CPT | Performed by: INTERNAL MEDICINE

## 2024-04-24 PROCEDURE — A4216 STERILE WATER/SALINE, 10 ML: HCPCS | Performed by: HOSPITALIST

## 2024-04-24 PROCEDURE — 82962 GLUCOSE BLOOD TEST: CPT

## 2024-04-24 PROCEDURE — 71045 X-RAY EXAM CHEST 1 VIEW: CPT

## 2024-04-24 PROCEDURE — C9113 INJ PANTOPRAZOLE SODIUM, VIA: HCPCS | Performed by: HOSPITALIST

## 2024-04-24 PROCEDURE — 94003 VENT MGMT INPAT SUBQ DAY: CPT

## 2024-04-24 PROCEDURE — 2580000003 HC RX 258: Performed by: INTERNAL MEDICINE

## 2024-04-24 PROCEDURE — 2709999900 HC NON-CHARGEABLE SUPPLY: Performed by: INTERNAL MEDICINE

## 2024-04-24 PROCEDURE — 36600 WITHDRAWAL OF ARTERIAL BLOOD: CPT

## 2024-04-24 PROCEDURE — 2700000000 HC OXYGEN THERAPY PER DAY

## 2024-04-24 PROCEDURE — 87070 CULTURE OTHR SPECIMN AEROBIC: CPT

## 2024-04-24 PROCEDURE — 88112 CYTOPATH CELL ENHANCE TECH: CPT

## 2024-04-24 PROCEDURE — 80048 BASIC METABOLIC PNL TOTAL CA: CPT

## 2024-04-24 PROCEDURE — 99232 SBSQ HOSP IP/OBS MODERATE 35: CPT | Performed by: INTERNAL MEDICINE

## 2024-04-24 PROCEDURE — 87205 SMEAR GRAM STAIN: CPT

## 2024-04-24 PROCEDURE — 2000000000 HC ICU R&B

## 2024-04-24 PROCEDURE — 0B9J8ZX DRAINAGE OF LEFT LOWER LUNG LOBE, VIA NATURAL OR ARTIFICIAL OPENING ENDOSCOPIC, DIAGNOSTIC: ICD-10-PCS | Performed by: INTERNAL MEDICINE

## 2024-04-24 PROCEDURE — 2580000003 HC RX 258: Performed by: EMERGENCY MEDICINE

## 2024-04-24 PROCEDURE — 84132 ASSAY OF SERUM POTASSIUM: CPT

## 2024-04-24 PROCEDURE — 6360000002 HC RX W HCPCS: Performed by: HOSPITALIST

## 2024-04-24 PROCEDURE — 83735 ASSAY OF MAGNESIUM: CPT

## 2024-04-24 RX ORDER — SCOLOPAMINE TRANSDERMAL SYSTEM 1 MG/1
1 PATCH, EXTENDED RELEASE TRANSDERMAL
Status: DISCONTINUED | OUTPATIENT
Start: 2024-04-24 | End: 2024-04-27

## 2024-04-24 RX ORDER — LIDOCAINE HYDROCHLORIDE 20 MG/ML
INJECTION, SOLUTION EPIDURAL; INFILTRATION; INTRACAUDAL; PERINEURAL PRN
Status: DISCONTINUED | OUTPATIENT
Start: 2024-04-24 | End: 2024-04-24 | Stop reason: ALTCHOICE

## 2024-04-24 RX ORDER — AMLODIPINE BESYLATE 5 MG/1
10 TABLET ORAL DAILY
Status: DISCONTINUED | OUTPATIENT
Start: 2024-04-24 | End: 2024-04-26

## 2024-04-24 RX ORDER — MIDAZOLAM HYDROCHLORIDE 2 MG/2ML
2 INJECTION, SOLUTION INTRAMUSCULAR; INTRAVENOUS EVERY 4 HOURS PRN
Status: DISCONTINUED | OUTPATIENT
Start: 2024-04-24 | End: 2024-05-03

## 2024-04-24 RX ORDER — HYDROCODONE POLISTIREX AND CHLORPHENIRAMINE POLISTIREX 10; 8 MG/5ML; MG/5ML
5 SUSPENSION, EXTENDED RELEASE ORAL 3 TIMES DAILY
Status: DISCONTINUED | OUTPATIENT
Start: 2024-04-24 | End: 2024-04-24

## 2024-04-24 RX ORDER — MIDAZOLAM HYDROCHLORIDE 2 MG/2ML
2 INJECTION, SOLUTION INTRAMUSCULAR; INTRAVENOUS EVERY 4 HOURS
Status: DISCONTINUED | OUTPATIENT
Start: 2024-04-24 | End: 2024-04-24

## 2024-04-24 RX ORDER — CODEINE PHOSPHATE AND GUAIFENESIN 10; 100 MG/5ML; MG/5ML
10 SOLUTION ORAL EVERY 6 HOURS
Status: DISCONTINUED | OUTPATIENT
Start: 2024-04-24 | End: 2024-04-25

## 2024-04-24 RX ORDER — FUROSEMIDE 10 MG/ML
40 INJECTION INTRAMUSCULAR; INTRAVENOUS DAILY
Status: DISCONTINUED | OUTPATIENT
Start: 2024-04-24 | End: 2024-04-26

## 2024-04-24 RX ORDER — FENTANYL CITRATE 50 UG/ML
50 INJECTION, SOLUTION INTRAMUSCULAR; INTRAVENOUS EVERY 4 HOURS PRN
Status: DISCONTINUED | OUTPATIENT
Start: 2024-04-24 | End: 2024-05-04

## 2024-04-24 RX ORDER — MAGNESIUM SULFATE 1 G/100ML
1000 INJECTION INTRAVENOUS ONCE
Status: COMPLETED | OUTPATIENT
Start: 2024-04-24 | End: 2024-04-24

## 2024-04-24 RX ADMIN — IPRATROPIUM BROMIDE 0.5 MG: 0.5 SOLUTION RESPIRATORY (INHALATION) at 19:26

## 2024-04-24 RX ADMIN — DOXYCYCLINE 100 MG: 100 INJECTION, POWDER, LYOPHILIZED, FOR SOLUTION INTRAVENOUS at 20:36

## 2024-04-24 RX ADMIN — SODIUM CHLORIDE, PRESERVATIVE FREE 10 ML: 5 INJECTION INTRAVENOUS at 09:40

## 2024-04-24 RX ADMIN — PROPOFOL 35 MCG/KG/MIN: 10 INJECTION, EMULSION INTRAVENOUS at 16:00

## 2024-04-24 RX ADMIN — FENTANYL CITRATE 50 MCG: 50 INJECTION INTRAMUSCULAR; INTRAVENOUS at 18:40

## 2024-04-24 RX ADMIN — ARFORMOTEROL TARTRATE 15 MCG: 15 SOLUTION RESPIRATORY (INHALATION) at 19:26

## 2024-04-24 RX ADMIN — FENTANYL CITRATE 50 MCG: 50 INJECTION INTRAMUSCULAR; INTRAVENOUS at 22:17

## 2024-04-24 RX ADMIN — PIPERACILLIN AND TAZOBACTAM 3375 MG: 3; .375 INJECTION, POWDER, LYOPHILIZED, FOR SOLUTION INTRAVENOUS at 21:33

## 2024-04-24 RX ADMIN — ENOXAPARIN SODIUM 30 MG: 100 INJECTION SUBCUTANEOUS at 13:33

## 2024-04-24 RX ADMIN — THIAMINE HCL TAB 100 MG 100 MG: 100 TAB at 09:33

## 2024-04-24 RX ADMIN — LEVETIRACETAM 500 MG: 100 INJECTION, SOLUTION INTRAVENOUS at 02:14

## 2024-04-24 RX ADMIN — CHLORHEXIDINE GLUCONATE, 0.12% ORAL RINSE 15 ML: 1.2 SOLUTION DENTAL at 20:38

## 2024-04-24 RX ADMIN — IPRATROPIUM BROMIDE 0.5 MG: 0.5 SOLUTION RESPIRATORY (INHALATION) at 14:11

## 2024-04-24 RX ADMIN — IPRATROPIUM BROMIDE 0.5 MG: 0.5 SOLUTION RESPIRATORY (INHALATION) at 08:04

## 2024-04-24 RX ADMIN — PROPOFOL 30 MCG/KG/MIN: 10 INJECTION, EMULSION INTRAVENOUS at 03:52

## 2024-04-24 RX ADMIN — LEVETIRACETAM 500 MG: 100 INJECTION, SOLUTION INTRAVENOUS at 15:57

## 2024-04-24 RX ADMIN — PROPOFOL 50 MCG/KG/MIN: 10 INJECTION, EMULSION INTRAVENOUS at 22:09

## 2024-04-24 RX ADMIN — MAGNESIUM SULFATE HEPTAHYDRATE 1000 MG: 1 INJECTION, SOLUTION INTRAVENOUS at 09:35

## 2024-04-24 RX ADMIN — PROPOFOL 50 MCG/KG/MIN: 10 INJECTION, EMULSION INTRAVENOUS at 13:18

## 2024-04-24 RX ADMIN — POTASSIUM BICARBONATE 40 MEQ: 782 TABLET, EFFERVESCENT ORAL at 09:31

## 2024-04-24 RX ADMIN — Medication 2 UNITS: at 13:32

## 2024-04-24 RX ADMIN — INSULIN GLARGINE 14 UNITS: 100 INJECTION, SOLUTION SUBCUTANEOUS at 21:29

## 2024-04-24 RX ADMIN — PROPOFOL 30 MCG/KG/MIN: 10 INJECTION, EMULSION INTRAVENOUS at 07:52

## 2024-04-24 RX ADMIN — MIDAZOLAM HYDROCHLORIDE 2 MG: 1 INJECTION, SOLUTION INTRAMUSCULAR; INTRAVENOUS at 13:52

## 2024-04-24 RX ADMIN — INSULIN GLARGINE 14 UNITS: 100 INJECTION, SOLUTION SUBCUTANEOUS at 09:32

## 2024-04-24 RX ADMIN — HYDROCODONE POLISTIREX AND CHLORPHENIRAMINE POLISTIREX 5 ML: 10; 8 SUSPENSION, EXTENDED RELEASE ORAL at 09:32

## 2024-04-24 RX ADMIN — PIPERACILLIN AND TAZOBACTAM 3375 MG: 3; .375 INJECTION, POWDER, LYOPHILIZED, FOR SOLUTION INTRAVENOUS at 14:35

## 2024-04-24 RX ADMIN — PROPOFOL 40 MCG/KG/MIN: 10 INJECTION, EMULSION INTRAVENOUS at 10:44

## 2024-04-24 RX ADMIN — Medication 2 UNITS: at 07:51

## 2024-04-24 RX ADMIN — AMLODIPINE BESYLATE 10 MG: 5 TABLET ORAL at 09:33

## 2024-04-24 RX ADMIN — ENOXAPARIN SODIUM 30 MG: 100 INJECTION SUBCUTANEOUS at 01:54

## 2024-04-24 RX ADMIN — PIPERACILLIN AND TAZOBACTAM 3375 MG: 3; .375 INJECTION, POWDER, LYOPHILIZED, FOR SOLUTION INTRAVENOUS at 04:49

## 2024-04-24 RX ADMIN — GUAIFENESIN AND CODEINE PHOSPHATE 10 ML: 100; 10 SOLUTION ORAL at 20:38

## 2024-04-24 RX ADMIN — Medication 2 UNITS: at 19:18

## 2024-04-24 RX ADMIN — GUAIFENESIN AND CODEINE PHOSPHATE 10 ML: 100; 10 SOLUTION ORAL at 14:35

## 2024-04-24 RX ADMIN — MIDAZOLAM HYDROCHLORIDE 2 MG: 1 INJECTION, SOLUTION INTRAMUSCULAR; INTRAVENOUS at 21:05

## 2024-04-24 RX ADMIN — DOXYCYCLINE 100 MG: 100 INJECTION, POWDER, LYOPHILIZED, FOR SOLUTION INTRAVENOUS at 08:14

## 2024-04-24 RX ADMIN — PROPOFOL 45 MCG/KG/MIN: 10 INJECTION, EMULSION INTRAVENOUS at 19:33

## 2024-04-24 RX ADMIN — BUDESONIDE 250 MCG: 0.25 INHALANT RESPIRATORY (INHALATION) at 19:26

## 2024-04-24 RX ADMIN — BUDESONIDE 250 MCG: 0.25 INHALANT RESPIRATORY (INHALATION) at 08:03

## 2024-04-24 RX ADMIN — ARFORMOTEROL TARTRATE 15 MCG: 15 SOLUTION RESPIRATORY (INHALATION) at 08:03

## 2024-04-24 RX ADMIN — SODIUM CHLORIDE, PRESERVATIVE FREE 40 MG: 5 INJECTION INTRAVENOUS at 09:32

## 2024-04-24 RX ADMIN — FOLIC ACID 1 MG: 1 TABLET ORAL at 09:33

## 2024-04-24 RX ADMIN — FUROSEMIDE 40 MG: 10 INJECTION, SOLUTION INTRAMUSCULAR; INTRAVENOUS at 09:31

## 2024-04-24 RX ADMIN — SODIUM CHLORIDE, PRESERVATIVE FREE 10 ML: 5 INJECTION INTRAVENOUS at 21:05

## 2024-04-24 RX ADMIN — Medication 3 MG/HR: at 02:08

## 2024-04-24 ASSESSMENT — PULMONARY FUNCTION TESTS
PIF_VALUE: 18
PIF_VALUE: 18
PIF_VALUE: 19
PIF_VALUE: 23
PIF_VALUE: 19
PIF_VALUE: 22

## 2024-04-24 ASSESSMENT — PAIN SCALES - GENERAL: PAINLEVEL_OUTOF10: 0

## 2024-04-24 ASSESSMENT — PAIN - FUNCTIONAL ASSESSMENT
PAIN_FUNCTIONAL_ASSESSMENT: ADULT NONVERBAL PAIN SCALE (NPVS)

## 2024-04-24 NOTE — PERIOP NOTE
Report given to Martina WHITE, SBAR bronchoscopy; specimens obtained, Lidocaine 2% 4 cc given endotracheally,

## 2024-04-25 ENCOUNTER — APPOINTMENT (OUTPATIENT)
Facility: HOSPITAL | Age: 39
DRG: 207 | End: 2024-04-25
Payer: COMMERCIAL

## 2024-04-25 LAB
ANION GAP SERPL CALC-SCNC: 5 MMOL/L (ref 3–18)
ARTERIAL PATENCY WRIST A: POSITIVE
BACTERIA SPEC CULT: NORMAL
BASE EXCESS BLD CALC-SCNC: 5.5 MMOL/L
BASOPHILS # BLD: 0 K/UL (ref 0–0.1)
BASOPHILS NFR BLD: 0 % (ref 0–2)
BDY SITE: ABNORMAL
BUN SERPL-MCNC: 15 MG/DL (ref 7–18)
BUN/CREAT SERPL: 21 (ref 12–20)
CA-I SERPL-SCNC: 1.22 MMOL/L (ref 1.12–1.32)
CALCIUM SERPL-MCNC: 9.2 MG/DL (ref 8.5–10.1)
CHLORIDE SERPL-SCNC: 103 MMOL/L (ref 100–111)
CO2 SERPL-SCNC: 31 MMOL/L (ref 21–32)
CREAT SERPL-MCNC: 0.72 MG/DL (ref 0.6–1.3)
CYTOLOGY-NON GYN: NORMAL
DIFFERENTIAL METHOD BLD: ABNORMAL
EOSINOPHIL # BLD: 0.5 K/UL (ref 0–0.4)
EOSINOPHIL NFR BLD: 4 % (ref 0–5)
ERYTHROCYTE [DISTWIDTH] IN BLOOD BY AUTOMATED COUNT: 12.8 % (ref 11.6–14.5)
GAS FLOW.O2 O2 DELIVERY SYS: ABNORMAL
GLUCOSE BLD STRIP.AUTO-MCNC: 194 MG/DL (ref 70–110)
GLUCOSE BLD STRIP.AUTO-MCNC: 246 MG/DL (ref 70–110)
GLUCOSE BLD STRIP.AUTO-MCNC: 261 MG/DL (ref 70–110)
GLUCOSE BLD STRIP.AUTO-MCNC: 283 MG/DL (ref 70–110)
GLUCOSE SERPL-MCNC: 218 MG/DL (ref 74–99)
HCO3 BLD-SCNC: 30.9 MMOL/L (ref 22–26)
HCT VFR BLD AUTO: 30.8 % (ref 36–48)
HGB BLD-MCNC: 10.4 G/DL (ref 13–16)
IMM GRANULOCYTES # BLD AUTO: 0.3 K/UL (ref 0–0.04)
IMM GRANULOCYTES NFR BLD AUTO: 2 % (ref 0–0.5)
LDH SERPL L TO P-CCNC: 229 U/L (ref 81–234)
LYMPHOCYTES # BLD: 2.1 K/UL (ref 0.9–3.6)
LYMPHOCYTES NFR BLD: 18 % (ref 21–52)
MAGNESIUM SERPL-MCNC: 1.9 MG/DL (ref 1.6–2.6)
MCH RBC QN AUTO: 31.3 PG (ref 24–34)
MCHC RBC AUTO-ENTMCNC: 33.8 G/DL (ref 31–37)
MCV RBC AUTO: 92.8 FL (ref 78–100)
MONOCYTES # BLD: 1.1 K/UL (ref 0.05–1.2)
MONOCYTES NFR BLD: 10 % (ref 3–10)
NEUTS SEG # BLD: 7.3 K/UL (ref 1.8–8)
NEUTS SEG NFR BLD: 65 % (ref 40–73)
NRBC # BLD: 0 K/UL (ref 0–0.01)
NRBC BLD-RTO: 0 PER 100 WBC
O2/TOTAL GAS SETTING VFR VENT: 40 %
PCO2 BLD: 47.9 MMHG (ref 35–45)
PEEP RESPIRATORY: 5 CMH2O
PH BLD: 7.42 (ref 7.35–7.45)
PHOSPHATE SERPL-MCNC: 3.8 MG/DL (ref 2.5–4.9)
PLATELET # BLD AUTO: 423 K/UL (ref 135–420)
PMV BLD AUTO: 9.6 FL (ref 9.2–11.8)
PO2 BLD: 72 MMHG (ref 80–100)
POTASSIUM SERPL-SCNC: 3.7 MMOL/L (ref 3.5–5.5)
PRESSURE SUPPORT SETTING VENT: 12 CMH2O
RBC # BLD AUTO: 3.32 M/UL (ref 4.35–5.65)
SAO2 % BLD: 94.2 % (ref 92–97)
SERVICE CMNT-IMP: ABNORMAL
SERVICE CMNT-IMP: NORMAL
SODIUM SERPL-SCNC: 139 MMOL/L (ref 136–145)
SPECIMEN TYPE: ABNORMAL
VENTILATION MODE VENT: ABNORMAL
WBC # BLD AUTO: 11.2 K/UL (ref 4.6–13.2)

## 2024-04-25 PROCEDURE — 87389 HIV-1 AG W/HIV-1&-2 AB AG IA: CPT

## 2024-04-25 PROCEDURE — 6370000000 HC RX 637 (ALT 250 FOR IP): Performed by: FAMILY MEDICINE

## 2024-04-25 PROCEDURE — 82962 GLUCOSE BLOOD TEST: CPT

## 2024-04-25 PROCEDURE — 6360000002 HC RX W HCPCS: Performed by: INTERNAL MEDICINE

## 2024-04-25 PROCEDURE — 6360000002 HC RX W HCPCS

## 2024-04-25 PROCEDURE — 94003 VENT MGMT INPAT SUBQ DAY: CPT

## 2024-04-25 PROCEDURE — 6370000000 HC RX 637 (ALT 250 FOR IP): Performed by: INTERNAL MEDICINE

## 2024-04-25 PROCEDURE — 71045 X-RAY EXAM CHEST 1 VIEW: CPT

## 2024-04-25 PROCEDURE — 80048 BASIC METABOLIC PNL TOTAL CA: CPT

## 2024-04-25 PROCEDURE — 84100 ASSAY OF PHOSPHORUS: CPT

## 2024-04-25 PROCEDURE — 82330 ASSAY OF CALCIUM: CPT

## 2024-04-25 PROCEDURE — 83735 ASSAY OF MAGNESIUM: CPT

## 2024-04-25 PROCEDURE — 36600 WITHDRAWAL OF ARTERIAL BLOOD: CPT

## 2024-04-25 PROCEDURE — 2580000003 HC RX 258: Performed by: INTERNAL MEDICINE

## 2024-04-25 PROCEDURE — 85025 COMPLETE CBC W/AUTO DIFF WBC: CPT

## 2024-04-25 PROCEDURE — 6360000002 HC RX W HCPCS: Performed by: HOSPITALIST

## 2024-04-25 PROCEDURE — 2000000000 HC ICU R&B

## 2024-04-25 PROCEDURE — 94640 AIRWAY INHALATION TREATMENT: CPT

## 2024-04-25 PROCEDURE — 2500000003 HC RX 250 WO HCPCS: Performed by: INTERNAL MEDICINE

## 2024-04-25 PROCEDURE — 82803 BLOOD GASES ANY COMBINATION: CPT

## 2024-04-25 PROCEDURE — 2700000000 HC OXYGEN THERAPY PER DAY

## 2024-04-25 PROCEDURE — 6360000002 HC RX W HCPCS: Performed by: EMERGENCY MEDICINE

## 2024-04-25 PROCEDURE — 99232 SBSQ HOSP IP/OBS MODERATE 35: CPT | Performed by: NURSE PRACTITIONER

## 2024-04-25 PROCEDURE — C9113 INJ PANTOPRAZOLE SODIUM, VIA: HCPCS | Performed by: HOSPITALIST

## 2024-04-25 PROCEDURE — A4216 STERILE WATER/SALINE, 10 ML: HCPCS | Performed by: HOSPITALIST

## 2024-04-25 PROCEDURE — 2580000003 HC RX 258: Performed by: HOSPITALIST

## 2024-04-25 PROCEDURE — 2580000003 HC RX 258: Performed by: EMERGENCY MEDICINE

## 2024-04-25 PROCEDURE — 83615 LACTATE (LD) (LDH) ENZYME: CPT

## 2024-04-25 RX ORDER — HALOPERIDOL 5 MG/ML
5 INJECTION INTRAMUSCULAR ONCE
Status: DISCONTINUED | OUTPATIENT
Start: 2024-04-25 | End: 2024-05-04

## 2024-04-25 RX ORDER — HALOPERIDOL 5 MG/ML
5 INJECTION INTRAMUSCULAR ONCE
Status: COMPLETED | OUTPATIENT
Start: 2024-04-25 | End: 2024-04-25

## 2024-04-25 RX ORDER — DEXMEDETOMIDINE HYDROCHLORIDE 4 UG/ML
.1-1.5 INJECTION, SOLUTION INTRAVENOUS CONTINUOUS
Status: DISCONTINUED | OUTPATIENT
Start: 2024-04-25 | End: 2024-05-02

## 2024-04-25 RX ORDER — HALOPERIDOL 5 MG/ML
INJECTION INTRAMUSCULAR
Status: COMPLETED
Start: 2024-04-25 | End: 2024-04-25

## 2024-04-25 RX ADMIN — FUROSEMIDE 40 MG: 10 INJECTION, SOLUTION INTRAMUSCULAR; INTRAVENOUS at 08:36

## 2024-04-25 RX ADMIN — GUAIFENESIN AND CODEINE PHOSPHATE 10 ML: 100; 10 SOLUTION ORAL at 00:53

## 2024-04-25 RX ADMIN — FOLIC ACID 1 MG: 1 TABLET ORAL at 08:37

## 2024-04-25 RX ADMIN — THIAMINE HCL TAB 100 MG 100 MG: 100 TAB at 08:37

## 2024-04-25 RX ADMIN — INSULIN GLARGINE 14 UNITS: 100 INJECTION, SOLUTION SUBCUTANEOUS at 08:37

## 2024-04-25 RX ADMIN — Medication 2 UNITS: at 23:51

## 2024-04-25 RX ADMIN — DOXYCYCLINE 100 MG: 100 INJECTION, POWDER, LYOPHILIZED, FOR SOLUTION INTRAVENOUS at 18:24

## 2024-04-25 RX ADMIN — DEXMEDETOMIDINE HYDROCHLORIDE 0.2 MCG/KG/HR: 400 INJECTION, SOLUTION INTRAVENOUS at 20:28

## 2024-04-25 RX ADMIN — PROPOFOL 50 MCG/KG/MIN: 10 INJECTION, EMULSION INTRAVENOUS at 06:14

## 2024-04-25 RX ADMIN — IPRATROPIUM BROMIDE 0.5 MG: 0.5 SOLUTION RESPIRATORY (INHALATION) at 19:37

## 2024-04-25 RX ADMIN — LEVETIRACETAM 500 MG: 100 INJECTION, SOLUTION INTRAVENOUS at 03:35

## 2024-04-25 RX ADMIN — LEVETIRACETAM 500 MG: 100 INJECTION, SOLUTION INTRAVENOUS at 16:30

## 2024-04-25 RX ADMIN — Medication 4 UNITS: at 17:49

## 2024-04-25 RX ADMIN — FENTANYL CITRATE 50 MCG: 50 INJECTION INTRAMUSCULAR; INTRAVENOUS at 03:45

## 2024-04-25 RX ADMIN — ARFORMOTEROL TARTRATE 15 MCG: 15 SOLUTION RESPIRATORY (INHALATION) at 07:56

## 2024-04-25 RX ADMIN — ENOXAPARIN SODIUM 30 MG: 100 INJECTION SUBCUTANEOUS at 13:48

## 2024-04-25 RX ADMIN — Medication 2 UNITS: at 00:16

## 2024-04-25 RX ADMIN — DOXYCYCLINE 100 MG: 100 INJECTION, POWDER, LYOPHILIZED, FOR SOLUTION INTRAVENOUS at 08:27

## 2024-04-25 RX ADMIN — BUDESONIDE 250 MCG: 0.25 INHALANT RESPIRATORY (INHALATION) at 19:37

## 2024-04-25 RX ADMIN — MIDAZOLAM HYDROCHLORIDE 2 MG: 1 INJECTION, SOLUTION INTRAMUSCULAR; INTRAVENOUS at 01:26

## 2024-04-25 RX ADMIN — GUAIFENESIN AND CODEINE PHOSPHATE 10 ML: 100; 10 SOLUTION ORAL at 08:36

## 2024-04-25 RX ADMIN — SODIUM CHLORIDE, PRESERVATIVE FREE 10 ML: 5 INJECTION INTRAVENOUS at 08:37

## 2024-04-25 RX ADMIN — ENOXAPARIN SODIUM 30 MG: 100 INJECTION SUBCUTANEOUS at 00:53

## 2024-04-25 RX ADMIN — PIPERACILLIN AND TAZOBACTAM 3375 MG: 3; .375 INJECTION, POWDER, LYOPHILIZED, FOR SOLUTION INTRAVENOUS at 21:09

## 2024-04-25 RX ADMIN — SODIUM CHLORIDE, PRESERVATIVE FREE 10 ML: 5 INJECTION INTRAVENOUS at 20:37

## 2024-04-25 RX ADMIN — SODIUM CHLORIDE, PRESERVATIVE FREE 40 MG: 5 INJECTION INTRAVENOUS at 08:37

## 2024-04-25 RX ADMIN — IPRATROPIUM BROMIDE 0.5 MG: 0.5 SOLUTION RESPIRATORY (INHALATION) at 07:56

## 2024-04-25 RX ADMIN — IPRATROPIUM BROMIDE 0.5 MG: 0.5 SOLUTION RESPIRATORY (INHALATION) at 15:13

## 2024-04-25 RX ADMIN — HALOPERIDOL LACTATE 5 MG: 5 INJECTION, SOLUTION INTRAMUSCULAR at 20:12

## 2024-04-25 RX ADMIN — PROPOFOL 45 MCG/KG/MIN: 10 INJECTION, EMULSION INTRAVENOUS at 08:31

## 2024-04-25 RX ADMIN — PROPOFOL 50 MCG/KG/MIN: 10 INJECTION, EMULSION INTRAVENOUS at 03:35

## 2024-04-25 RX ADMIN — AMLODIPINE BESYLATE 10 MG: 5 TABLET ORAL at 08:36

## 2024-04-25 RX ADMIN — CHLORHEXIDINE GLUCONATE, 0.12% ORAL RINSE 15 ML: 1.2 SOLUTION DENTAL at 08:37

## 2024-04-25 RX ADMIN — BUDESONIDE 250 MCG: 0.25 INHALANT RESPIRATORY (INHALATION) at 07:56

## 2024-04-25 RX ADMIN — PIPERACILLIN AND TAZOBACTAM 3375 MG: 3; .375 INJECTION, POWDER, LYOPHILIZED, FOR SOLUTION INTRAVENOUS at 05:46

## 2024-04-25 RX ADMIN — PIPERACILLIN AND TAZOBACTAM 3375 MG: 3; .375 INJECTION, POWDER, LYOPHILIZED, FOR SOLUTION INTRAVENOUS at 13:49

## 2024-04-25 RX ADMIN — HALOPERIDOL 5 MG: 5 INJECTION INTRAMUSCULAR at 20:12

## 2024-04-25 RX ADMIN — ARFORMOTEROL TARTRATE 15 MCG: 15 SOLUTION RESPIRATORY (INHALATION) at 19:38

## 2024-04-25 ASSESSMENT — PULMONARY FUNCTION TESTS
PIF_VALUE: 17
PIF_VALUE: 17

## 2024-04-25 ASSESSMENT — PAIN SCALES - GENERAL
PAINLEVEL_OUTOF10: 2
PAINLEVEL_OUTOF10: 4
PAINLEVEL_OUTOF10: 2

## 2024-04-26 ENCOUNTER — APPOINTMENT (OUTPATIENT)
Facility: HOSPITAL | Age: 39
DRG: 207 | End: 2024-04-26
Payer: COMMERCIAL

## 2024-04-26 PROBLEM — R41.0 DELIRIUM: Status: ACTIVE | Noted: 2024-04-26

## 2024-04-26 LAB
ANION GAP SERPL CALC-SCNC: 6 MMOL/L (ref 3–18)
BACTERIA SPEC CULT: NORMAL
BACTERIA SPEC CULT: NORMAL
BUN SERPL-MCNC: 16 MG/DL (ref 7–18)
BUN/CREAT SERPL: 22 (ref 12–20)
CA-I SERPL-SCNC: 1.24 MMOL/L (ref 1.12–1.32)
CALCIUM SERPL-MCNC: 10.1 MG/DL (ref 8.5–10.1)
CHLORIDE SERPL-SCNC: 105 MMOL/L (ref 100–111)
CO2 SERPL-SCNC: 30 MMOL/L (ref 21–32)
CREAT SERPL-MCNC: 0.72 MG/DL (ref 0.6–1.3)
GLUCOSE BLD STRIP.AUTO-MCNC: 242 MG/DL (ref 70–110)
GLUCOSE BLD STRIP.AUTO-MCNC: 246 MG/DL (ref 70–110)
GLUCOSE BLD STRIP.AUTO-MCNC: 247 MG/DL (ref 70–110)
GLUCOSE BLD STRIP.AUTO-MCNC: 262 MG/DL (ref 70–110)
GLUCOSE SERPL-MCNC: 270 MG/DL (ref 74–99)
HIV 1+2 AB+HIV1 P24 AG SERPL QL IA: NONREACTIVE
HIV 1/2 RESULT COMMENT: NORMAL
MAGNESIUM SERPL-MCNC: 1.8 MG/DL (ref 1.6–2.6)
PHOSPHATE SERPL-MCNC: 3.4 MG/DL (ref 2.5–4.9)
POTASSIUM SERPL-SCNC: 3.6 MMOL/L (ref 3.5–5.5)
SERVICE CMNT-IMP: NORMAL
SERVICE CMNT-IMP: NORMAL
SODIUM SERPL-SCNC: 141 MMOL/L (ref 136–145)

## 2024-04-26 PROCEDURE — A4216 STERILE WATER/SALINE, 10 ML: HCPCS | Performed by: HOSPITALIST

## 2024-04-26 PROCEDURE — 2500000003 HC RX 250 WO HCPCS: Performed by: INTERNAL MEDICINE

## 2024-04-26 PROCEDURE — 2580000003 HC RX 258: Performed by: INTERNAL MEDICINE

## 2024-04-26 PROCEDURE — 6360000002 HC RX W HCPCS: Performed by: INTERNAL MEDICINE

## 2024-04-26 PROCEDURE — 2700000000 HC OXYGEN THERAPY PER DAY

## 2024-04-26 PROCEDURE — 84100 ASSAY OF PHOSPHORUS: CPT

## 2024-04-26 PROCEDURE — 2580000003 HC RX 258: Performed by: HOSPITALIST

## 2024-04-26 PROCEDURE — 6360000002 HC RX W HCPCS: Performed by: HOSPITALIST

## 2024-04-26 PROCEDURE — 80048 BASIC METABOLIC PNL TOTAL CA: CPT

## 2024-04-26 PROCEDURE — C9113 INJ PANTOPRAZOLE SODIUM, VIA: HCPCS | Performed by: HOSPITALIST

## 2024-04-26 PROCEDURE — 82330 ASSAY OF CALCIUM: CPT

## 2024-04-26 PROCEDURE — 2000000000 HC ICU R&B

## 2024-04-26 PROCEDURE — 6370000000 HC RX 637 (ALT 250 FOR IP): Performed by: INTERNAL MEDICINE

## 2024-04-26 PROCEDURE — 82962 GLUCOSE BLOOD TEST: CPT

## 2024-04-26 PROCEDURE — 6370000000 HC RX 637 (ALT 250 FOR IP): Performed by: FAMILY MEDICINE

## 2024-04-26 PROCEDURE — 83735 ASSAY OF MAGNESIUM: CPT

## 2024-04-26 PROCEDURE — 71045 X-RAY EXAM CHEST 1 VIEW: CPT

## 2024-04-26 PROCEDURE — 99232 SBSQ HOSP IP/OBS MODERATE 35: CPT | Performed by: NURSE PRACTITIONER

## 2024-04-26 PROCEDURE — 94640 AIRWAY INHALATION TREATMENT: CPT

## 2024-04-26 RX ORDER — SODIUM CHLORIDE 9 MG/ML
INJECTION, SOLUTION INTRAVENOUS CONTINUOUS
Status: DISCONTINUED | OUTPATIENT
Start: 2024-04-26 | End: 2024-05-02

## 2024-04-26 RX ORDER — FOLIC ACID 5 MG/ML
1 INJECTION, SOLUTION INTRAMUSCULAR; INTRAVENOUS; SUBCUTANEOUS DAILY
Status: DISCONTINUED | OUTPATIENT
Start: 2024-04-26 | End: 2024-04-26

## 2024-04-26 RX ORDER — THIAMINE HYDROCHLORIDE 100 MG/ML
100 INJECTION, SOLUTION INTRAMUSCULAR; INTRAVENOUS DAILY
Status: DISCONTINUED | OUTPATIENT
Start: 2024-04-26 | End: 2024-04-26 | Stop reason: ALTCHOICE

## 2024-04-26 RX ORDER — INSULIN GLARGINE 100 [IU]/ML
10 INJECTION, SOLUTION SUBCUTANEOUS DAILY
Status: DISCONTINUED | OUTPATIENT
Start: 2024-04-26 | End: 2024-04-26

## 2024-04-26 RX ORDER — HYDRALAZINE HYDROCHLORIDE 20 MG/ML
10 INJECTION INTRAMUSCULAR; INTRAVENOUS EVERY 6 HOURS
Status: DISCONTINUED | OUTPATIENT
Start: 2024-04-26 | End: 2024-05-03

## 2024-04-26 RX ORDER — INSULIN GLARGINE 100 [IU]/ML
8 INJECTION, SOLUTION SUBCUTANEOUS DAILY
Status: DISCONTINUED | OUTPATIENT
Start: 2024-04-26 | End: 2024-05-04

## 2024-04-26 RX ORDER — BENZTROPINE MESYLATE 1 MG/ML
1 INJECTION INTRAMUSCULAR; INTRAVENOUS ONCE
Status: COMPLETED | OUTPATIENT
Start: 2024-04-26 | End: 2024-04-26

## 2024-04-26 RX ORDER — HALOPERIDOL 5 MG/ML
5 INJECTION INTRAMUSCULAR ONCE
Status: COMPLETED | OUTPATIENT
Start: 2024-04-26 | End: 2024-04-26

## 2024-04-26 RX ORDER — LORAZEPAM 2 MG/ML
2 INJECTION INTRAMUSCULAR ONCE
Status: COMPLETED | OUTPATIENT
Start: 2024-04-26 | End: 2024-04-26

## 2024-04-26 RX ADMIN — LEVETIRACETAM 500 MG: 100 INJECTION, SOLUTION INTRAVENOUS at 04:15

## 2024-04-26 RX ADMIN — SODIUM CHLORIDE, PRESERVATIVE FREE 40 MG: 5 INJECTION INTRAVENOUS at 08:09

## 2024-04-26 RX ADMIN — ENOXAPARIN SODIUM 30 MG: 100 INJECTION SUBCUTANEOUS at 13:34

## 2024-04-26 RX ADMIN — SODIUM CHLORIDE, PRESERVATIVE FREE 10 ML: 5 INJECTION INTRAVENOUS at 08:09

## 2024-04-26 RX ADMIN — PIPERACILLIN AND TAZOBACTAM 3375 MG: 3; .375 INJECTION, POWDER, LYOPHILIZED, FOR SOLUTION INTRAVENOUS at 15:09

## 2024-04-26 RX ADMIN — ARFORMOTEROL TARTRATE 15 MCG: 15 SOLUTION RESPIRATORY (INHALATION) at 19:52

## 2024-04-26 RX ADMIN — DEXMEDETOMIDINE HYDROCHLORIDE 0.2 MCG/KG/HR: 400 INJECTION, SOLUTION INTRAVENOUS at 11:38

## 2024-04-26 RX ADMIN — FOLIC ACID: 5 INJECTION, SOLUTION INTRAMUSCULAR; INTRAVENOUS; SUBCUTANEOUS at 13:34

## 2024-04-26 RX ADMIN — BUDESONIDE 250 MCG: 0.25 INHALANT RESPIRATORY (INHALATION) at 07:16

## 2024-04-26 RX ADMIN — INSULIN GLARGINE 8 UNITS: 100 INJECTION, SOLUTION SUBCUTANEOUS at 12:00

## 2024-04-26 RX ADMIN — PIPERACILLIN AND TAZOBACTAM 3375 MG: 3; .375 INJECTION, POWDER, LYOPHILIZED, FOR SOLUTION INTRAVENOUS at 21:19

## 2024-04-26 RX ADMIN — SODIUM CHLORIDE: 9 INJECTION, SOLUTION INTRAVENOUS at 12:01

## 2024-04-26 RX ADMIN — Medication 2 UNITS: at 23:21

## 2024-04-26 RX ADMIN — Medication 2 UNITS: at 12:00

## 2024-04-26 RX ADMIN — HYDRALAZINE HYDROCHLORIDE 10 MG: 20 INJECTION INTRAMUSCULAR; INTRAVENOUS at 12:01

## 2024-04-26 RX ADMIN — BUDESONIDE 250 MCG: 0.25 INHALANT RESPIRATORY (INHALATION) at 19:52

## 2024-04-26 RX ADMIN — ENOXAPARIN SODIUM 30 MG: 100 INJECTION SUBCUTANEOUS at 01:25

## 2024-04-26 RX ADMIN — SODIUM CHLORIDE, PRESERVATIVE FREE 10 ML: 5 INJECTION INTRAVENOUS at 21:15

## 2024-04-26 RX ADMIN — HALOPERIDOL LACTATE 5 MG: 5 INJECTION, SOLUTION INTRAMUSCULAR at 21:09

## 2024-04-26 RX ADMIN — DEXMEDETOMIDINE HYDROCHLORIDE 0.8 MCG/KG/HR: 400 INJECTION, SOLUTION INTRAVENOUS at 22:11

## 2024-04-26 RX ADMIN — PIPERACILLIN AND TAZOBACTAM 3375 MG: 3; .375 INJECTION, POWDER, LYOPHILIZED, FOR SOLUTION INTRAVENOUS at 05:31

## 2024-04-26 RX ADMIN — DEXMEDETOMIDINE HYDROCHLORIDE 0.6 MCG/KG/HR: 400 INJECTION, SOLUTION INTRAVENOUS at 00:42

## 2024-04-26 RX ADMIN — BENZTROPINE MESYLATE 1 MG: 1 INJECTION INTRAMUSCULAR; INTRAVENOUS at 21:57

## 2024-04-26 RX ADMIN — Medication 2 UNITS: at 17:16

## 2024-04-26 RX ADMIN — LEVETIRACETAM 500 MG: 100 INJECTION, SOLUTION INTRAVENOUS at 17:11

## 2024-04-26 RX ADMIN — Medication 4 UNITS: at 05:31

## 2024-04-26 RX ADMIN — HYDRALAZINE HYDROCHLORIDE 10 MG: 20 INJECTION INTRAMUSCULAR; INTRAVENOUS at 23:21

## 2024-04-26 RX ADMIN — LORAZEPAM 2 MG: 2 INJECTION INTRAMUSCULAR; INTRAVENOUS at 21:56

## 2024-04-26 RX ADMIN — HYDRALAZINE HYDROCHLORIDE 10 MG: 20 INJECTION INTRAMUSCULAR; INTRAVENOUS at 17:16

## 2024-04-26 RX ADMIN — IPRATROPIUM BROMIDE 0.5 MG: 0.5 SOLUTION RESPIRATORY (INHALATION) at 07:16

## 2024-04-26 RX ADMIN — ARFORMOTEROL TARTRATE 15 MCG: 15 SOLUTION RESPIRATORY (INHALATION) at 07:17

## 2024-04-26 ASSESSMENT — PAIN SCALES - GENERAL
PAINLEVEL_OUTOF10: 0
PAINLEVEL_OUTOF10: 2
PAINLEVEL_OUTOF10: 0

## 2024-04-27 LAB
ANION GAP SERPL CALC-SCNC: 10 MMOL/L (ref 3–18)
BASOPHILS # BLD: 0.1 K/UL (ref 0–0.1)
BASOPHILS NFR BLD: 1 % (ref 0–2)
BUN SERPL-MCNC: 15 MG/DL (ref 7–18)
BUN/CREAT SERPL: 25 (ref 12–20)
CALCIUM SERPL-MCNC: 9.2 MG/DL (ref 8.5–10.1)
CHLORIDE SERPL-SCNC: 112 MMOL/L (ref 100–111)
CO2 SERPL-SCNC: 20 MMOL/L (ref 21–32)
CREAT SERPL-MCNC: 0.61 MG/DL (ref 0.6–1.3)
DIFFERENTIAL METHOD BLD: ABNORMAL
EOSINOPHIL # BLD: 0.2 K/UL (ref 0–0.4)
EOSINOPHIL NFR BLD: 2 % (ref 0–5)
ERYTHROCYTE [DISTWIDTH] IN BLOOD BY AUTOMATED COUNT: 12.5 % (ref 11.6–14.5)
GLUCOSE BLD STRIP.AUTO-MCNC: 207 MG/DL (ref 70–110)
GLUCOSE BLD STRIP.AUTO-MCNC: 235 MG/DL (ref 70–110)
GLUCOSE BLD STRIP.AUTO-MCNC: 251 MG/DL (ref 70–110)
GLUCOSE BLD STRIP.AUTO-MCNC: 257 MG/DL (ref 70–110)
GLUCOSE SERPL-MCNC: 247 MG/DL (ref 74–99)
HCT VFR BLD AUTO: 35.1 % (ref 36–48)
HGB BLD-MCNC: 11.6 G/DL (ref 13–16)
IMM GRANULOCYTES # BLD AUTO: 0.2 K/UL (ref 0–0.04)
IMM GRANULOCYTES NFR BLD AUTO: 2 % (ref 0–0.5)
LYMPHOCYTES # BLD: 1.8 K/UL (ref 0.9–3.6)
LYMPHOCYTES NFR BLD: 18 % (ref 21–52)
MCH RBC QN AUTO: 30.9 PG (ref 24–34)
MCHC RBC AUTO-ENTMCNC: 33 G/DL (ref 31–37)
MCV RBC AUTO: 93.6 FL (ref 78–100)
MONOCYTES # BLD: 0.8 K/UL (ref 0.05–1.2)
MONOCYTES NFR BLD: 8 % (ref 3–10)
NEUTS SEG # BLD: 7.1 K/UL (ref 1.8–8)
NEUTS SEG NFR BLD: 70 % (ref 40–73)
NRBC # BLD: 0 K/UL (ref 0–0.01)
NRBC BLD-RTO: 0 PER 100 WBC
PLATELET # BLD AUTO: 401 K/UL (ref 135–420)
PMV BLD AUTO: 10 FL (ref 9.2–11.8)
POTASSIUM SERPL-SCNC: 3 MMOL/L (ref 3.5–5.5)
POTASSIUM SERPL-SCNC: 3.9 MMOL/L (ref 3.5–5.5)
RBC # BLD AUTO: 3.75 M/UL (ref 4.35–5.65)
SODIUM SERPL-SCNC: 142 MMOL/L (ref 136–145)
WBC # BLD AUTO: 10.2 K/UL (ref 4.6–13.2)

## 2024-04-27 PROCEDURE — 2580000003 HC RX 258: Performed by: HOSPITALIST

## 2024-04-27 PROCEDURE — 2580000003 HC RX 258: Performed by: INTERNAL MEDICINE

## 2024-04-27 PROCEDURE — 2500000003 HC RX 250 WO HCPCS: Performed by: INTERNAL MEDICINE

## 2024-04-27 PROCEDURE — 6370000000 HC RX 637 (ALT 250 FOR IP): Performed by: FAMILY MEDICINE

## 2024-04-27 PROCEDURE — 2000000000 HC ICU R&B

## 2024-04-27 PROCEDURE — 2700000000 HC OXYGEN THERAPY PER DAY

## 2024-04-27 PROCEDURE — 6360000002 HC RX W HCPCS: Performed by: INTERNAL MEDICINE

## 2024-04-27 PROCEDURE — A4216 STERILE WATER/SALINE, 10 ML: HCPCS | Performed by: HOSPITALIST

## 2024-04-27 PROCEDURE — 84132 ASSAY OF SERUM POTASSIUM: CPT

## 2024-04-27 PROCEDURE — 6360000002 HC RX W HCPCS: Performed by: HOSPITALIST

## 2024-04-27 PROCEDURE — 82962 GLUCOSE BLOOD TEST: CPT

## 2024-04-27 PROCEDURE — 85025 COMPLETE CBC W/AUTO DIFF WBC: CPT

## 2024-04-27 PROCEDURE — 6370000000 HC RX 637 (ALT 250 FOR IP): Performed by: INTERNAL MEDICINE

## 2024-04-27 PROCEDURE — C9113 INJ PANTOPRAZOLE SODIUM, VIA: HCPCS | Performed by: HOSPITALIST

## 2024-04-27 PROCEDURE — 94640 AIRWAY INHALATION TREATMENT: CPT

## 2024-04-27 PROCEDURE — 80048 BASIC METABOLIC PNL TOTAL CA: CPT

## 2024-04-27 RX ORDER — IPRATROPIUM BROMIDE AND ALBUTEROL SULFATE 2.5; .5 MG/3ML; MG/3ML
1 SOLUTION RESPIRATORY (INHALATION) EVERY 4 HOURS PRN
Status: DISCONTINUED | OUTPATIENT
Start: 2024-04-27 | End: 2024-05-09 | Stop reason: HOSPADM

## 2024-04-27 RX ORDER — CLONIDINE 0.1 MG/24H
1 PATCH, EXTENDED RELEASE TRANSDERMAL WEEKLY
Status: DISCONTINUED | OUTPATIENT
Start: 2024-04-27 | End: 2024-05-09 | Stop reason: HOSPADM

## 2024-04-27 RX ADMIN — ARFORMOTEROL TARTRATE 15 MCG: 15 SOLUTION RESPIRATORY (INHALATION) at 08:55

## 2024-04-27 RX ADMIN — SODIUM CHLORIDE, PRESERVATIVE FREE 40 MG: 5 INJECTION INTRAVENOUS at 09:39

## 2024-04-27 RX ADMIN — LEVETIRACETAM 500 MG: 100 INJECTION, SOLUTION INTRAVENOUS at 16:14

## 2024-04-27 RX ADMIN — HYDRALAZINE HYDROCHLORIDE 10 MG: 20 INJECTION INTRAMUSCULAR; INTRAVENOUS at 23:58

## 2024-04-27 RX ADMIN — POTASSIUM CHLORIDE 10 MEQ: 7.45 INJECTION INTRAVENOUS at 13:30

## 2024-04-27 RX ADMIN — Medication 4 UNITS: at 05:39

## 2024-04-27 RX ADMIN — DEXMEDETOMIDINE HYDROCHLORIDE 1 MCG/KG/HR: 400 INJECTION, SOLUTION INTRAVENOUS at 17:21

## 2024-04-27 RX ADMIN — LEVETIRACETAM 500 MG: 100 INJECTION, SOLUTION INTRAVENOUS at 03:35

## 2024-04-27 RX ADMIN — HYDRALAZINE HYDROCHLORIDE 10 MG: 20 INJECTION INTRAMUSCULAR; INTRAVENOUS at 10:54

## 2024-04-27 RX ADMIN — AMPICILLIN SODIUM AND SULBACTAM SODIUM 3000 MG: 2; 1 INJECTION, POWDER, FOR SOLUTION INTRAMUSCULAR; INTRAVENOUS at 14:59

## 2024-04-27 RX ADMIN — DEXMEDETOMIDINE HYDROCHLORIDE 1.2 MCG/KG/HR: 400 INJECTION, SOLUTION INTRAVENOUS at 15:26

## 2024-04-27 RX ADMIN — SODIUM CHLORIDE, PRESERVATIVE FREE 10 ML: 5 INJECTION INTRAVENOUS at 09:44

## 2024-04-27 RX ADMIN — ENOXAPARIN SODIUM 30 MG: 100 INJECTION SUBCUTANEOUS at 02:06

## 2024-04-27 RX ADMIN — FOLIC ACID: 5 INJECTION, SOLUTION INTRAMUSCULAR; INTRAVENOUS; SUBCUTANEOUS at 13:40

## 2024-04-27 RX ADMIN — DEXMEDETOMIDINE HYDROCHLORIDE 0.6 MCG/KG/HR: 400 INJECTION, SOLUTION INTRAVENOUS at 06:26

## 2024-04-27 RX ADMIN — HYDRALAZINE HYDROCHLORIDE 10 MG: 20 INJECTION INTRAMUSCULAR; INTRAVENOUS at 16:16

## 2024-04-27 RX ADMIN — HYDRALAZINE HYDROCHLORIDE 10 MG: 20 INJECTION INTRAMUSCULAR; INTRAVENOUS at 05:40

## 2024-04-27 RX ADMIN — SODIUM CHLORIDE, PRESERVATIVE FREE 10 ML: 5 INJECTION INTRAVENOUS at 20:29

## 2024-04-27 RX ADMIN — Medication 2 UNITS: at 23:55

## 2024-04-27 RX ADMIN — BUDESONIDE 250 MCG: 0.25 INHALANT RESPIRATORY (INHALATION) at 08:55

## 2024-04-27 RX ADMIN — Medication 2 UNITS: at 12:49

## 2024-04-27 RX ADMIN — INSULIN GLARGINE 8 UNITS: 100 INJECTION, SOLUTION SUBCUTANEOUS at 09:40

## 2024-04-27 RX ADMIN — AMPICILLIN SODIUM AND SULBACTAM SODIUM 3000 MG: 2; 1 INJECTION, POWDER, FOR SOLUTION INTRAMUSCULAR; INTRAVENOUS at 20:03

## 2024-04-27 RX ADMIN — DEXMEDETOMIDINE HYDROCHLORIDE 0.6 MCG/KG/HR: 400 INJECTION, SOLUTION INTRAVENOUS at 21:33

## 2024-04-27 RX ADMIN — Medication 2 UNITS: at 19:32

## 2024-04-27 RX ADMIN — POTASSIUM CHLORIDE 10 MEQ: 7.45 INJECTION INTRAVENOUS at 10:50

## 2024-04-27 RX ADMIN — DEXMEDETOMIDINE HYDROCHLORIDE 0.8 MCG/KG/HR: 400 INJECTION, SOLUTION INTRAVENOUS at 10:56

## 2024-04-27 RX ADMIN — POTASSIUM CHLORIDE 10 MEQ: 7.45 INJECTION INTRAVENOUS at 12:03

## 2024-04-27 RX ADMIN — POTASSIUM CHLORIDE 10 MEQ: 7.45 INJECTION INTRAVENOUS at 09:40

## 2024-04-27 RX ADMIN — ENOXAPARIN SODIUM 30 MG: 100 INJECTION SUBCUTANEOUS at 12:49

## 2024-04-27 RX ADMIN — POTASSIUM CHLORIDE 10 MEQ: 7.45 INJECTION INTRAVENOUS at 14:26

## 2024-04-27 RX ADMIN — SODIUM CHLORIDE: 9 INJECTION, SOLUTION INTRAVENOUS at 03:40

## 2024-04-27 RX ADMIN — POTASSIUM CHLORIDE 10 MEQ: 7.45 INJECTION INTRAVENOUS at 06:02

## 2024-04-27 ASSESSMENT — PAIN SCALES - GENERAL
PAINLEVEL_OUTOF10: 0
PAINLEVEL_OUTOF10: 0

## 2024-04-28 ENCOUNTER — APPOINTMENT (OUTPATIENT)
Facility: HOSPITAL | Age: 39
DRG: 207 | End: 2024-04-28
Payer: COMMERCIAL

## 2024-04-28 LAB
ALBUMIN SERPL-MCNC: 2.4 G/DL (ref 3.4–5)
ALBUMIN/GLOB SERPL: 0.5 (ref 0.8–1.7)
ALP SERPL-CCNC: 75 U/L (ref 45–117)
ALT SERPL-CCNC: 21 U/L (ref 16–61)
ANION GAP SERPL CALC-SCNC: 11 MMOL/L (ref 3–18)
AST SERPL-CCNC: 19 U/L (ref 10–38)
BACTERIA SPEC CULT: ABNORMAL
BILIRUB DIRECT SERPL-MCNC: 0.1 MG/DL (ref 0–0.2)
BILIRUB SERPL-MCNC: 0.4 MG/DL (ref 0.2–1)
BUN SERPL-MCNC: 16 MG/DL (ref 7–18)
BUN/CREAT SERPL: 26 (ref 12–20)
CALCIUM SERPL-MCNC: 9.4 MG/DL (ref 8.5–10.1)
CHLORIDE SERPL-SCNC: 112 MMOL/L (ref 100–111)
CO2 SERPL-SCNC: 19 MMOL/L (ref 21–32)
CREAT SERPL-MCNC: 0.61 MG/DL (ref 0.6–1.3)
GLOBULIN SER CALC-MCNC: 4.7 G/DL (ref 2–4)
GLUCOSE BLD STRIP.AUTO-MCNC: 206 MG/DL (ref 70–110)
GLUCOSE BLD STRIP.AUTO-MCNC: 237 MG/DL (ref 70–110)
GLUCOSE BLD STRIP.AUTO-MCNC: 253 MG/DL (ref 70–110)
GLUCOSE SERPL-MCNC: 236 MG/DL (ref 74–99)
GRAM STN SPEC: ABNORMAL
MAGNESIUM SERPL-MCNC: 1.6 MG/DL (ref 1.6–2.6)
MAGNESIUM SERPL-MCNC: 1.9 MG/DL (ref 1.6–2.6)
PHOSPHATE SERPL-MCNC: 2.8 MG/DL (ref 2.5–4.9)
POTASSIUM SERPL-SCNC: 3.4 MMOL/L (ref 3.5–5.5)
POTASSIUM SERPL-SCNC: 3.9 MMOL/L (ref 3.5–5.5)
PROCALCITONIN SERPL-MCNC: 0.06 NG/ML
PROT SERPL-MCNC: 7.1 G/DL (ref 6.4–8.2)
SERVICE CMNT-IMP: ABNORMAL
SODIUM SERPL-SCNC: 142 MMOL/L (ref 136–145)

## 2024-04-28 PROCEDURE — 6360000002 HC RX W HCPCS: Performed by: INTERNAL MEDICINE

## 2024-04-28 PROCEDURE — 71045 X-RAY EXAM CHEST 1 VIEW: CPT

## 2024-04-28 PROCEDURE — 84100 ASSAY OF PHOSPHORUS: CPT

## 2024-04-28 PROCEDURE — 83735 ASSAY OF MAGNESIUM: CPT

## 2024-04-28 PROCEDURE — 2500000003 HC RX 250 WO HCPCS: Performed by: INTERNAL MEDICINE

## 2024-04-28 PROCEDURE — 6370000000 HC RX 637 (ALT 250 FOR IP): Performed by: INTERNAL MEDICINE

## 2024-04-28 PROCEDURE — 80048 BASIC METABOLIC PNL TOTAL CA: CPT

## 2024-04-28 PROCEDURE — 80076 HEPATIC FUNCTION PANEL: CPT

## 2024-04-28 PROCEDURE — 2000000000 HC ICU R&B

## 2024-04-28 PROCEDURE — 6360000002 HC RX W HCPCS: Performed by: HOSPITALIST

## 2024-04-28 PROCEDURE — 2580000003 HC RX 258: Performed by: INTERNAL MEDICINE

## 2024-04-28 PROCEDURE — 2580000003 HC RX 258: Performed by: HOSPITALIST

## 2024-04-28 PROCEDURE — C9113 INJ PANTOPRAZOLE SODIUM, VIA: HCPCS | Performed by: HOSPITALIST

## 2024-04-28 PROCEDURE — 6370000000 HC RX 637 (ALT 250 FOR IP): Performed by: FAMILY MEDICINE

## 2024-04-28 PROCEDURE — 84132 ASSAY OF SERUM POTASSIUM: CPT

## 2024-04-28 PROCEDURE — A4216 STERILE WATER/SALINE, 10 ML: HCPCS | Performed by: HOSPITALIST

## 2024-04-28 PROCEDURE — 82962 GLUCOSE BLOOD TEST: CPT

## 2024-04-28 PROCEDURE — 84145 PROCALCITONIN (PCT): CPT

## 2024-04-28 RX ORDER — FUROSEMIDE 10 MG/ML
40 INJECTION INTRAMUSCULAR; INTRAVENOUS ONCE
Status: COMPLETED | OUTPATIENT
Start: 2024-04-28 | End: 2024-04-28

## 2024-04-28 RX ADMIN — DEXMEDETOMIDINE HYDROCHLORIDE 0.6 MCG/KG/HR: 400 INJECTION, SOLUTION INTRAVENOUS at 04:47

## 2024-04-28 RX ADMIN — SODIUM CHLORIDE: 9 INJECTION, SOLUTION INTRAVENOUS at 00:14

## 2024-04-28 RX ADMIN — HYDRALAZINE HYDROCHLORIDE 10 MG: 20 INJECTION INTRAMUSCULAR; INTRAVENOUS at 23:51

## 2024-04-28 RX ADMIN — HYDRALAZINE HYDROCHLORIDE 10 MG: 20 INJECTION INTRAMUSCULAR; INTRAVENOUS at 18:00

## 2024-04-28 RX ADMIN — Medication 2 UNITS: at 05:48

## 2024-04-28 RX ADMIN — HYDRALAZINE HYDROCHLORIDE 10 MG: 20 INJECTION INTRAMUSCULAR; INTRAVENOUS at 11:11

## 2024-04-28 RX ADMIN — DEXMEDETOMIDINE HYDROCHLORIDE 0.4 MCG/KG/HR: 400 INJECTION, SOLUTION INTRAVENOUS at 19:26

## 2024-04-28 RX ADMIN — AMPICILLIN SODIUM AND SULBACTAM SODIUM 3000 MG: 2; 1 INJECTION, POWDER, FOR SOLUTION INTRAMUSCULAR; INTRAVENOUS at 01:58

## 2024-04-28 RX ADMIN — SODIUM CHLORIDE: 900 INJECTION, SOLUTION INTRAVENOUS at 00:19

## 2024-04-28 RX ADMIN — SODIUM CHLORIDE, PRESERVATIVE FREE 40 MG: 5 INJECTION INTRAVENOUS at 10:04

## 2024-04-28 RX ADMIN — INSULIN GLARGINE 8 UNITS: 100 INJECTION, SOLUTION SUBCUTANEOUS at 09:55

## 2024-04-28 RX ADMIN — ENOXAPARIN SODIUM 30 MG: 100 INJECTION SUBCUTANEOUS at 01:16

## 2024-04-28 RX ADMIN — DEXMEDETOMIDINE HYDROCHLORIDE 0.4 MCG/KG/HR: 400 INJECTION, SOLUTION INTRAVENOUS at 12:23

## 2024-04-28 RX ADMIN — FOLIC ACID: 5 INJECTION, SOLUTION INTRAMUSCULAR; INTRAVENOUS; SUBCUTANEOUS at 10:09

## 2024-04-28 RX ADMIN — MAGNESIUM SULFATE HEPTAHYDRATE 2000 MG: 40 INJECTION, SOLUTION INTRAVENOUS at 12:30

## 2024-04-28 RX ADMIN — SODIUM CHLORIDE: 9 INJECTION, SOLUTION INTRAVENOUS at 20:29

## 2024-04-28 RX ADMIN — AMPICILLIN SODIUM AND SULBACTAM SODIUM 3000 MG: 2; 1 INJECTION, POWDER, FOR SOLUTION INTRAMUSCULAR; INTRAVENOUS at 14:16

## 2024-04-28 RX ADMIN — LEVETIRACETAM 500 MG: 100 INJECTION, SOLUTION INTRAVENOUS at 16:33

## 2024-04-28 RX ADMIN — Medication 2 UNITS: at 12:27

## 2024-04-28 RX ADMIN — ENOXAPARIN SODIUM 30 MG: 100 INJECTION SUBCUTANEOUS at 14:19

## 2024-04-28 RX ADMIN — FUROSEMIDE 40 MG: 10 INJECTION, SOLUTION INTRAMUSCULAR; INTRAVENOUS at 14:15

## 2024-04-28 RX ADMIN — DEXMEDETOMIDINE HYDROCHLORIDE 0.6 MCG/KG/HR: 400 INJECTION, SOLUTION INTRAVENOUS at 23:51

## 2024-04-28 RX ADMIN — Medication 4 UNITS: at 18:01

## 2024-04-28 RX ADMIN — SODIUM CHLORIDE, PRESERVATIVE FREE 10 ML: 5 INJECTION INTRAVENOUS at 20:37

## 2024-04-28 RX ADMIN — AMPICILLIN SODIUM AND SULBACTAM SODIUM 3000 MG: 2; 1 INJECTION, POWDER, FOR SOLUTION INTRAMUSCULAR; INTRAVENOUS at 07:46

## 2024-04-28 RX ADMIN — SODIUM CHLORIDE, PRESERVATIVE FREE 10 ML: 5 INJECTION INTRAVENOUS at 10:04

## 2024-04-28 RX ADMIN — POTASSIUM CHLORIDE 10 MEQ: 7.45 INJECTION INTRAVENOUS at 06:14

## 2024-04-28 RX ADMIN — AMPICILLIN SODIUM AND SULBACTAM SODIUM 3000 MG: 2; 1 INJECTION, POWDER, FOR SOLUTION INTRAMUSCULAR; INTRAVENOUS at 20:36

## 2024-04-28 RX ADMIN — LEVETIRACETAM 500 MG: 100 INJECTION, SOLUTION INTRAVENOUS at 03:33

## 2024-04-28 RX ADMIN — POTASSIUM CHLORIDE 10 MEQ: 7.45 INJECTION INTRAVENOUS at 07:32

## 2024-04-28 RX ADMIN — POTASSIUM CHLORIDE 10 MEQ: 7.45 INJECTION INTRAVENOUS at 11:10

## 2024-04-28 RX ADMIN — HYDRALAZINE HYDROCHLORIDE 10 MG: 20 INJECTION INTRAMUSCULAR; INTRAVENOUS at 04:49

## 2024-04-28 RX ADMIN — POTASSIUM CHLORIDE 10 MEQ: 7.45 INJECTION INTRAVENOUS at 09:55

## 2024-04-29 LAB
ANION GAP SERPL CALC-SCNC: 9 MMOL/L (ref 3–18)
BASOPHILS # BLD: 0 K/UL (ref 0–0.1)
BASOPHILS NFR BLD: 0 % (ref 0–2)
BUN SERPL-MCNC: 16 MG/DL (ref 7–18)
BUN/CREAT SERPL: 23 (ref 12–20)
CALCIUM SERPL-MCNC: 9 MG/DL (ref 8.5–10.1)
CHLORIDE SERPL-SCNC: 113 MMOL/L (ref 100–111)
CO2 SERPL-SCNC: 22 MMOL/L (ref 21–32)
CREAT SERPL-MCNC: 0.7 MG/DL (ref 0.6–1.3)
DIFFERENTIAL METHOD BLD: ABNORMAL
EOSINOPHIL # BLD: 0.1 K/UL (ref 0–0.4)
EOSINOPHIL NFR BLD: 2 % (ref 0–5)
ERYTHROCYTE [DISTWIDTH] IN BLOOD BY AUTOMATED COUNT: 12.7 % (ref 11.6–14.5)
GLUCOSE BLD STRIP.AUTO-MCNC: 210 MG/DL (ref 70–110)
GLUCOSE BLD STRIP.AUTO-MCNC: 213 MG/DL (ref 70–110)
GLUCOSE BLD STRIP.AUTO-MCNC: 219 MG/DL (ref 70–110)
GLUCOSE BLD STRIP.AUTO-MCNC: 220 MG/DL (ref 70–110)
GLUCOSE BLD STRIP.AUTO-MCNC: 268 MG/DL (ref 70–110)
GLUCOSE SERPL-MCNC: 237 MG/DL (ref 74–99)
HCT VFR BLD AUTO: 35.2 % (ref 36–48)
HGB BLD-MCNC: 11.9 G/DL (ref 13–16)
IMM GRANULOCYTES # BLD AUTO: 0.1 K/UL (ref 0–0.04)
IMM GRANULOCYTES NFR BLD AUTO: 1 % (ref 0–0.5)
LYMPHOCYTES # BLD: 2 K/UL (ref 0.9–3.6)
LYMPHOCYTES NFR BLD: 29 % (ref 21–52)
MCH RBC QN AUTO: 30.4 PG (ref 24–34)
MCHC RBC AUTO-ENTMCNC: 33.8 G/DL (ref 31–37)
MCV RBC AUTO: 90 FL (ref 78–100)
MONOCYTES # BLD: 0.6 K/UL (ref 0.05–1.2)
MONOCYTES NFR BLD: 9 % (ref 3–10)
NEUTS SEG # BLD: 4.2 K/UL (ref 1.8–8)
NEUTS SEG NFR BLD: 59 % (ref 40–73)
NRBC # BLD: 0 K/UL (ref 0–0.01)
NRBC BLD-RTO: 0 PER 100 WBC
PLATELET # BLD AUTO: 515 K/UL (ref 135–420)
PMV BLD AUTO: 9.5 FL (ref 9.2–11.8)
POTASSIUM SERPL-SCNC: 3.4 MMOL/L (ref 3.5–5.5)
POTASSIUM SERPL-SCNC: 4.4 MMOL/L (ref 3.5–5.5)
PROCALCITONIN SERPL-MCNC: 8.67 NG/ML
RBC # BLD AUTO: 3.91 M/UL (ref 4.35–5.65)
SODIUM SERPL-SCNC: 144 MMOL/L (ref 136–145)
WBC # BLD AUTO: 7 K/UL (ref 4.6–13.2)

## 2024-04-29 PROCEDURE — 6360000002 HC RX W HCPCS: Performed by: INTERNAL MEDICINE

## 2024-04-29 PROCEDURE — 6370000000 HC RX 637 (ALT 250 FOR IP): Performed by: FAMILY MEDICINE

## 2024-04-29 PROCEDURE — 84132 ASSAY OF SERUM POTASSIUM: CPT

## 2024-04-29 PROCEDURE — 6370000000 HC RX 637 (ALT 250 FOR IP): Performed by: INTERNAL MEDICINE

## 2024-04-29 PROCEDURE — 6360000002 HC RX W HCPCS: Performed by: HOSPITALIST

## 2024-04-29 PROCEDURE — 80048 BASIC METABOLIC PNL TOTAL CA: CPT

## 2024-04-29 PROCEDURE — 85025 COMPLETE CBC W/AUTO DIFF WBC: CPT

## 2024-04-29 PROCEDURE — 2580000003 HC RX 258: Performed by: INTERNAL MEDICINE

## 2024-04-29 PROCEDURE — 82962 GLUCOSE BLOOD TEST: CPT

## 2024-04-29 PROCEDURE — 2000000000 HC ICU R&B

## 2024-04-29 PROCEDURE — 51798 US URINE CAPACITY MEASURE: CPT

## 2024-04-29 PROCEDURE — 2500000003 HC RX 250 WO HCPCS: Performed by: INTERNAL MEDICINE

## 2024-04-29 PROCEDURE — A4216 STERILE WATER/SALINE, 10 ML: HCPCS | Performed by: HOSPITALIST

## 2024-04-29 PROCEDURE — 2580000003 HC RX 258: Performed by: HOSPITALIST

## 2024-04-29 PROCEDURE — 84145 PROCALCITONIN (PCT): CPT

## 2024-04-29 PROCEDURE — C9113 INJ PANTOPRAZOLE SODIUM, VIA: HCPCS | Performed by: HOSPITALIST

## 2024-04-29 PROCEDURE — 51701 INSERT BLADDER CATHETER: CPT

## 2024-04-29 RX ADMIN — Medication 4 UNITS: at 00:06

## 2024-04-29 RX ADMIN — INSULIN GLARGINE 8 UNITS: 100 INJECTION, SOLUTION SUBCUTANEOUS at 08:51

## 2024-04-29 RX ADMIN — SODIUM CHLORIDE, PRESERVATIVE FREE 10 ML: 5 INJECTION INTRAVENOUS at 21:36

## 2024-04-29 RX ADMIN — ENOXAPARIN SODIUM 30 MG: 100 INJECTION SUBCUTANEOUS at 14:33

## 2024-04-29 RX ADMIN — FOLIC ACID: 5 INJECTION, SOLUTION INTRAMUSCULAR; INTRAVENOUS; SUBCUTANEOUS at 09:12

## 2024-04-29 RX ADMIN — POTASSIUM CHLORIDE 10 MEQ: 7.45 INJECTION INTRAVENOUS at 14:27

## 2024-04-29 RX ADMIN — DEXMEDETOMIDINE HYDROCHLORIDE 0.5 MCG/KG/HR: 400 INJECTION, SOLUTION INTRAVENOUS at 05:27

## 2024-04-29 RX ADMIN — LEVETIRACETAM 500 MG: 100 INJECTION, SOLUTION INTRAVENOUS at 04:33

## 2024-04-29 RX ADMIN — AMPICILLIN SODIUM AND SULBACTAM SODIUM 3000 MG: 2; 1 INJECTION, POWDER, FOR SOLUTION INTRAMUSCULAR; INTRAVENOUS at 21:35

## 2024-04-29 RX ADMIN — AMPICILLIN SODIUM AND SULBACTAM SODIUM 3000 MG: 2; 1 INJECTION, POWDER, FOR SOLUTION INTRAMUSCULAR; INTRAVENOUS at 14:30

## 2024-04-29 RX ADMIN — POTASSIUM CHLORIDE 10 MEQ: 7.45 INJECTION INTRAVENOUS at 12:46

## 2024-04-29 RX ADMIN — LEVETIRACETAM 500 MG: 100 INJECTION, SOLUTION INTRAVENOUS at 16:33

## 2024-04-29 RX ADMIN — SODIUM CHLORIDE, PRESERVATIVE FREE 10 ML: 5 INJECTION INTRAVENOUS at 09:10

## 2024-04-29 RX ADMIN — AMPICILLIN SODIUM AND SULBACTAM SODIUM 3000 MG: 2; 1 INJECTION, POWDER, FOR SOLUTION INTRAMUSCULAR; INTRAVENOUS at 08:50

## 2024-04-29 RX ADMIN — POTASSIUM CHLORIDE 10 MEQ: 7.45 INJECTION INTRAVENOUS at 09:10

## 2024-04-29 RX ADMIN — SODIUM CHLORIDE, PRESERVATIVE FREE 40 MG: 5 INJECTION INTRAVENOUS at 09:10

## 2024-04-29 RX ADMIN — HYDRALAZINE HYDROCHLORIDE 10 MG: 20 INJECTION INTRAMUSCULAR; INTRAVENOUS at 04:33

## 2024-04-29 RX ADMIN — HYDRALAZINE HYDROCHLORIDE 10 MG: 20 INJECTION INTRAMUSCULAR; INTRAVENOUS at 11:31

## 2024-04-29 RX ADMIN — Medication 2 UNITS: at 04:33

## 2024-04-29 RX ADMIN — Medication 2 UNITS: at 18:30

## 2024-04-29 RX ADMIN — ENOXAPARIN SODIUM 30 MG: 100 INJECTION SUBCUTANEOUS at 01:20

## 2024-04-29 RX ADMIN — HYDRALAZINE HYDROCHLORIDE 10 MG: 20 INJECTION INTRAMUSCULAR; INTRAVENOUS at 17:40

## 2024-04-29 RX ADMIN — Medication 2 UNITS: at 12:43

## 2024-04-29 RX ADMIN — POTASSIUM CHLORIDE 10 MEQ: 7.45 INJECTION INTRAVENOUS at 10:48

## 2024-04-29 RX ADMIN — AMPICILLIN SODIUM AND SULBACTAM SODIUM 3000 MG: 2; 1 INJECTION, POWDER, FOR SOLUTION INTRAMUSCULAR; INTRAVENOUS at 02:34

## 2024-04-30 LAB
ANION GAP SERPL CALC-SCNC: 12 MMOL/L (ref 3–18)
BUN SERPL-MCNC: 11 MG/DL (ref 7–18)
BUN/CREAT SERPL: 18 (ref 12–20)
CALCIUM SERPL-MCNC: 9.6 MG/DL (ref 8.5–10.1)
CHLORIDE SERPL-SCNC: 113 MMOL/L (ref 100–111)
CO2 SERPL-SCNC: 16 MMOL/L (ref 21–32)
CREAT SERPL-MCNC: 0.6 MG/DL (ref 0.6–1.3)
GLUCOSE BLD STRIP.AUTO-MCNC: 241 MG/DL (ref 70–110)
GLUCOSE BLD STRIP.AUTO-MCNC: 249 MG/DL (ref 70–110)
GLUCOSE BLD STRIP.AUTO-MCNC: 259 MG/DL (ref 70–110)
GLUCOSE SERPL-MCNC: 188 MG/DL (ref 74–99)
POTASSIUM SERPL-SCNC: 3.7 MMOL/L (ref 3.5–5.5)
SODIUM SERPL-SCNC: 141 MMOL/L (ref 136–145)

## 2024-04-30 PROCEDURE — 99232 SBSQ HOSP IP/OBS MODERATE 35: CPT | Performed by: NURSE PRACTITIONER

## 2024-04-30 PROCEDURE — 6370000000 HC RX 637 (ALT 250 FOR IP): Performed by: FAMILY MEDICINE

## 2024-04-30 PROCEDURE — 6370000000 HC RX 637 (ALT 250 FOR IP): Performed by: HOSPITALIST

## 2024-04-30 PROCEDURE — 2580000003 HC RX 258: Performed by: INTERNAL MEDICINE

## 2024-04-30 PROCEDURE — 6360000002 HC RX W HCPCS: Performed by: HOSPITALIST

## 2024-04-30 PROCEDURE — C9113 INJ PANTOPRAZOLE SODIUM, VIA: HCPCS | Performed by: HOSPITALIST

## 2024-04-30 PROCEDURE — 2580000003 HC RX 258: Performed by: HOSPITALIST

## 2024-04-30 PROCEDURE — 6370000000 HC RX 637 (ALT 250 FOR IP): Performed by: INTERNAL MEDICINE

## 2024-04-30 PROCEDURE — 6360000002 HC RX W HCPCS: Performed by: INTERNAL MEDICINE

## 2024-04-30 PROCEDURE — 2500000003 HC RX 250 WO HCPCS: Performed by: INTERNAL MEDICINE

## 2024-04-30 PROCEDURE — 97530 THERAPEUTIC ACTIVITIES: CPT

## 2024-04-30 PROCEDURE — A4216 STERILE WATER/SALINE, 10 ML: HCPCS | Performed by: HOSPITALIST

## 2024-04-30 PROCEDURE — 1100000000 HC RM PRIVATE

## 2024-04-30 PROCEDURE — 97167 OT EVAL HIGH COMPLEX 60 MIN: CPT

## 2024-04-30 PROCEDURE — 80048 BASIC METABOLIC PNL TOTAL CA: CPT

## 2024-04-30 PROCEDURE — 82962 GLUCOSE BLOOD TEST: CPT

## 2024-04-30 PROCEDURE — 97163 PT EVAL HIGH COMPLEX 45 MIN: CPT

## 2024-04-30 RX ADMIN — Medication 4 UNITS: at 11:57

## 2024-04-30 RX ADMIN — SODIUM CHLORIDE, PRESERVATIVE FREE 10 ML: 5 INJECTION INTRAVENOUS at 09:12

## 2024-04-30 RX ADMIN — SODIUM CHLORIDE: 9 INJECTION, SOLUTION INTRAVENOUS at 14:25

## 2024-04-30 RX ADMIN — AMPICILLIN SODIUM AND SULBACTAM SODIUM 3000 MG: 2; 1 INJECTION, POWDER, FOR SOLUTION INTRAMUSCULAR; INTRAVENOUS at 03:00

## 2024-04-30 RX ADMIN — SODIUM CHLORIDE, PRESERVATIVE FREE 40 MG: 5 INJECTION INTRAVENOUS at 09:11

## 2024-04-30 RX ADMIN — INSULIN GLARGINE 8 UNITS: 100 INJECTION, SOLUTION SUBCUTANEOUS at 09:12

## 2024-04-30 RX ADMIN — AMPICILLIN SODIUM AND SULBACTAM SODIUM 3000 MG: 2; 1 INJECTION, POWDER, FOR SOLUTION INTRAMUSCULAR; INTRAVENOUS at 14:26

## 2024-04-30 RX ADMIN — HYDRALAZINE HYDROCHLORIDE 10 MG: 20 INJECTION INTRAMUSCULAR; INTRAVENOUS at 00:22

## 2024-04-30 RX ADMIN — AMPICILLIN SODIUM AND SULBACTAM SODIUM 3000 MG: 2; 1 INJECTION, POWDER, FOR SOLUTION INTRAMUSCULAR; INTRAVENOUS at 21:24

## 2024-04-30 RX ADMIN — ACETAMINOPHEN 650 MG: 650 SUPPOSITORY RECTAL at 11:27

## 2024-04-30 RX ADMIN — ENOXAPARIN SODIUM 30 MG: 100 INJECTION SUBCUTANEOUS at 01:32

## 2024-04-30 RX ADMIN — ACETAMINOPHEN 650 MG: 650 SUPPOSITORY RECTAL at 18:58

## 2024-04-30 RX ADMIN — FOLIC ACID: 5 INJECTION, SOLUTION INTRAMUSCULAR; INTRAVENOUS; SUBCUTANEOUS at 09:11

## 2024-04-30 RX ADMIN — LEVETIRACETAM 500 MG: 100 INJECTION, SOLUTION INTRAVENOUS at 16:56

## 2024-04-30 RX ADMIN — ENOXAPARIN SODIUM 30 MG: 100 INJECTION SUBCUTANEOUS at 13:36

## 2024-04-30 RX ADMIN — Medication 1 UNITS: at 18:57

## 2024-04-30 RX ADMIN — Medication 2 UNITS: at 00:21

## 2024-04-30 RX ADMIN — AMPICILLIN SODIUM AND SULBACTAM SODIUM 3000 MG: 2; 1 INJECTION, POWDER, FOR SOLUTION INTRAMUSCULAR; INTRAVENOUS at 09:11

## 2024-04-30 RX ADMIN — LEVETIRACETAM 500 MG: 100 INJECTION, SOLUTION INTRAVENOUS at 03:53

## 2024-04-30 ASSESSMENT — PAIN SCALES - GENERAL: PAINLEVEL_OUTOF10: 3

## 2024-05-01 LAB
BASOPHILS # BLD: 0 K/UL (ref 0–0.1)
BASOPHILS NFR BLD: 1 % (ref 0–2)
DIFFERENTIAL METHOD BLD: ABNORMAL
EOSINOPHIL # BLD: 0.1 K/UL (ref 0–0.4)
EOSINOPHIL NFR BLD: 2 % (ref 0–5)
ERYTHROCYTE [DISTWIDTH] IN BLOOD BY AUTOMATED COUNT: 12.9 % (ref 11.6–14.5)
GLUCOSE BLD STRIP.AUTO-MCNC: 216 MG/DL (ref 70–110)
GLUCOSE BLD STRIP.AUTO-MCNC: 235 MG/DL (ref 70–110)
GLUCOSE BLD STRIP.AUTO-MCNC: 239 MG/DL (ref 70–110)
GLUCOSE BLD STRIP.AUTO-MCNC: 239 MG/DL (ref 70–110)
HCT VFR BLD AUTO: 37.1 % (ref 36–48)
HGB BLD-MCNC: 12.6 G/DL (ref 13–16)
IMM GRANULOCYTES # BLD AUTO: 0 K/UL (ref 0–0.04)
IMM GRANULOCYTES NFR BLD AUTO: 1 % (ref 0–0.5)
LYMPHOCYTES # BLD: 2 K/UL (ref 0.9–3.6)
LYMPHOCYTES NFR BLD: 23 % (ref 21–52)
MCH RBC QN AUTO: 31 PG (ref 24–34)
MCHC RBC AUTO-ENTMCNC: 34 G/DL (ref 31–37)
MCV RBC AUTO: 91.4 FL (ref 78–100)
MONOCYTES # BLD: 0.7 K/UL (ref 0.05–1.2)
MONOCYTES NFR BLD: 8 % (ref 3–10)
NEUTS SEG # BLD: 5.6 K/UL (ref 1.8–8)
NEUTS SEG NFR BLD: 66 % (ref 40–73)
NRBC # BLD: 0 K/UL (ref 0–0.01)
NRBC BLD-RTO: 0 PER 100 WBC
PLATELET # BLD AUTO: 481 K/UL (ref 135–420)
PMV BLD AUTO: 10.1 FL (ref 9.2–11.8)
RBC # BLD AUTO: 4.06 M/UL (ref 4.35–5.65)
WBC # BLD AUTO: 8.5 K/UL (ref 4.6–13.2)

## 2024-05-01 PROCEDURE — 2580000003 HC RX 258: Performed by: INTERNAL MEDICINE

## 2024-05-01 PROCEDURE — 97530 THERAPEUTIC ACTIVITIES: CPT

## 2024-05-01 PROCEDURE — 2500000003 HC RX 250 WO HCPCS: Performed by: INTERNAL MEDICINE

## 2024-05-01 PROCEDURE — 2580000003 HC RX 258: Performed by: HOSPITALIST

## 2024-05-01 PROCEDURE — 85025 COMPLETE CBC W/AUTO DIFF WBC: CPT

## 2024-05-01 PROCEDURE — 6360000002 HC RX W HCPCS: Performed by: FAMILY MEDICINE

## 2024-05-01 PROCEDURE — C9113 INJ PANTOPRAZOLE SODIUM, VIA: HCPCS | Performed by: HOSPITALIST

## 2024-05-01 PROCEDURE — 6360000002 HC RX W HCPCS: Performed by: HOSPITALIST

## 2024-05-01 PROCEDURE — A4216 STERILE WATER/SALINE, 10 ML: HCPCS | Performed by: HOSPITALIST

## 2024-05-01 PROCEDURE — 6360000002 HC RX W HCPCS: Performed by: INTERNAL MEDICINE

## 2024-05-01 PROCEDURE — 1100000000 HC RM PRIVATE

## 2024-05-01 PROCEDURE — 6370000000 HC RX 637 (ALT 250 FOR IP): Performed by: FAMILY MEDICINE

## 2024-05-01 PROCEDURE — 36415 COLL VENOUS BLD VENIPUNCTURE: CPT

## 2024-05-01 PROCEDURE — 6370000000 HC RX 637 (ALT 250 FOR IP): Performed by: INTERNAL MEDICINE

## 2024-05-01 PROCEDURE — 82962 GLUCOSE BLOOD TEST: CPT

## 2024-05-01 PROCEDURE — 97110 THERAPEUTIC EXERCISES: CPT

## 2024-05-01 RX ORDER — HALOPERIDOL 5 MG/ML
5 INJECTION INTRAMUSCULAR ONCE
Status: COMPLETED | OUTPATIENT
Start: 2024-05-01 | End: 2024-05-01

## 2024-05-01 RX ADMIN — LEVETIRACETAM 500 MG: 100 INJECTION, SOLUTION INTRAVENOUS at 05:27

## 2024-05-01 RX ADMIN — AMPICILLIN SODIUM AND SULBACTAM SODIUM 3000 MG: 2; 1 INJECTION, POWDER, FOR SOLUTION INTRAMUSCULAR; INTRAVENOUS at 16:12

## 2024-05-01 RX ADMIN — SODIUM CHLORIDE, PRESERVATIVE FREE 10 ML: 5 INJECTION INTRAVENOUS at 11:06

## 2024-05-01 RX ADMIN — HYDRALAZINE HYDROCHLORIDE 10 MG: 20 INJECTION INTRAMUSCULAR; INTRAVENOUS at 17:22

## 2024-05-01 RX ADMIN — Medication 2 UNITS: at 17:26

## 2024-05-01 RX ADMIN — HYDRALAZINE HYDROCHLORIDE 10 MG: 20 INJECTION INTRAMUSCULAR; INTRAVENOUS at 05:32

## 2024-05-01 RX ADMIN — Medication 2 UNITS: at 00:35

## 2024-05-01 RX ADMIN — HALOPERIDOL LACTATE 5 MG: 5 INJECTION, SOLUTION INTRAMUSCULAR at 17:51

## 2024-05-01 RX ADMIN — ENOXAPARIN SODIUM 30 MG: 100 INJECTION SUBCUTANEOUS at 13:37

## 2024-05-01 RX ADMIN — ENOXAPARIN SODIUM 30 MG: 100 INJECTION SUBCUTANEOUS at 02:16

## 2024-05-01 RX ADMIN — INSULIN GLARGINE 8 UNITS: 100 INJECTION, SOLUTION SUBCUTANEOUS at 11:03

## 2024-05-01 RX ADMIN — HYDRALAZINE HYDROCHLORIDE 10 MG: 20 INJECTION INTRAMUSCULAR; INTRAVENOUS at 11:06

## 2024-05-01 RX ADMIN — SODIUM CHLORIDE, PRESERVATIVE FREE 10 ML: 5 INJECTION INTRAVENOUS at 00:37

## 2024-05-01 RX ADMIN — LEVETIRACETAM 500 MG: 100 INJECTION, SOLUTION INTRAVENOUS at 16:12

## 2024-05-01 RX ADMIN — SODIUM CHLORIDE, PRESERVATIVE FREE 40 MG: 5 INJECTION INTRAVENOUS at 11:06

## 2024-05-01 RX ADMIN — Medication 2 UNITS: at 06:06

## 2024-05-01 RX ADMIN — HYDRALAZINE HYDROCHLORIDE 10 MG: 20 INJECTION INTRAMUSCULAR; INTRAVENOUS at 00:35

## 2024-05-01 RX ADMIN — AMPICILLIN SODIUM AND SULBACTAM SODIUM 3000 MG: 2; 1 INJECTION, POWDER, FOR SOLUTION INTRAMUSCULAR; INTRAVENOUS at 10:40

## 2024-05-01 RX ADMIN — Medication 2 UNITS: at 13:37

## 2024-05-01 RX ADMIN — FOLIC ACID: 5 INJECTION, SOLUTION INTRAMUSCULAR; INTRAVENOUS; SUBCUTANEOUS at 15:20

## 2024-05-01 RX ADMIN — AMPICILLIN SODIUM AND SULBACTAM SODIUM 3000 MG: 2; 1 INJECTION, POWDER, FOR SOLUTION INTRAMUSCULAR; INTRAVENOUS at 02:16

## 2024-05-01 RX ADMIN — AMPICILLIN SODIUM AND SULBACTAM SODIUM 3000 MG: 2; 1 INJECTION, POWDER, FOR SOLUTION INTRAMUSCULAR; INTRAVENOUS at 23:18

## 2024-05-01 NOTE — PRE-PROCEDURE INSTRUCTIONS
Order rec'd for IR G tube placement. Called floor to speak with primary RN, unable to reach, will attempt call back later. Discussed with IR sudeep Crum and IR MOE Livingstno.    1330: assessed Pt with ENMA Livingston NP and primary RN Lida.

## 2024-05-02 LAB
GLUCOSE BLD STRIP.AUTO-MCNC: 244 MG/DL (ref 70–110)
GLUCOSE BLD STRIP.AUTO-MCNC: 246 MG/DL (ref 70–110)
GLUCOSE BLD STRIP.AUTO-MCNC: 254 MG/DL (ref 70–110)
GLUCOSE BLD STRIP.AUTO-MCNC: 260 MG/DL (ref 70–110)
GLUCOSE BLD STRIP.AUTO-MCNC: 274 MG/DL (ref 70–110)
GLUCOSE BLD STRIP.AUTO-MCNC: 292 MG/DL (ref 70–110)

## 2024-05-02 PROCEDURE — 1100000000 HC RM PRIVATE

## 2024-05-02 PROCEDURE — 99232 SBSQ HOSP IP/OBS MODERATE 35: CPT | Performed by: NURSE PRACTITIONER

## 2024-05-02 PROCEDURE — 6360000002 HC RX W HCPCS: Performed by: INTERNAL MEDICINE

## 2024-05-02 PROCEDURE — 6370000000 HC RX 637 (ALT 250 FOR IP): Performed by: INTERNAL MEDICINE

## 2024-05-02 PROCEDURE — A4216 STERILE WATER/SALINE, 10 ML: HCPCS | Performed by: HOSPITALIST

## 2024-05-02 PROCEDURE — 2500000003 HC RX 250 WO HCPCS: Performed by: INTERNAL MEDICINE

## 2024-05-02 PROCEDURE — 2580000003 HC RX 258: Performed by: INTERNAL MEDICINE

## 2024-05-02 PROCEDURE — 2580000003 HC RX 258: Performed by: HOSPITALIST

## 2024-05-02 PROCEDURE — 6360000002 HC RX W HCPCS: Performed by: HOSPITALIST

## 2024-05-02 PROCEDURE — 6370000000 HC RX 637 (ALT 250 FOR IP): Performed by: FAMILY MEDICINE

## 2024-05-02 PROCEDURE — C9113 INJ PANTOPRAZOLE SODIUM, VIA: HCPCS | Performed by: HOSPITALIST

## 2024-05-02 PROCEDURE — 82962 GLUCOSE BLOOD TEST: CPT

## 2024-05-02 RX ORDER — HALOPERIDOL 5 MG/ML
4 INJECTION INTRAMUSCULAR ONCE
Status: COMPLETED | OUTPATIENT
Start: 2024-05-02 | End: 2024-05-02

## 2024-05-02 RX ORDER — DEXTROSE AND SODIUM CHLORIDE 5; .45 G/100ML; G/100ML
INJECTION, SOLUTION INTRAVENOUS CONTINUOUS
Status: DISCONTINUED | OUTPATIENT
Start: 2024-05-02 | End: 2024-05-09 | Stop reason: HOSPADM

## 2024-05-02 RX ADMIN — FOLIC ACID: 5 INJECTION, SOLUTION INTRAMUSCULAR; INTRAVENOUS; SUBCUTANEOUS at 09:32

## 2024-05-02 RX ADMIN — HYDRALAZINE HYDROCHLORIDE 10 MG: 20 INJECTION INTRAMUSCULAR; INTRAVENOUS at 17:25

## 2024-05-02 RX ADMIN — Medication 2 UNITS: at 18:01

## 2024-05-02 RX ADMIN — AMPICILLIN SODIUM AND SULBACTAM SODIUM 3000 MG: 2; 1 INJECTION, POWDER, FOR SOLUTION INTRAMUSCULAR; INTRAVENOUS at 23:08

## 2024-05-02 RX ADMIN — MIDAZOLAM HYDROCHLORIDE 2 MG: 1 INJECTION, SOLUTION INTRAMUSCULAR; INTRAVENOUS at 22:07

## 2024-05-02 RX ADMIN — HYDRALAZINE HYDROCHLORIDE 10 MG: 20 INJECTION INTRAMUSCULAR; INTRAVENOUS at 23:51

## 2024-05-02 RX ADMIN — LEVETIRACETAM 500 MG: 100 INJECTION, SOLUTION INTRAVENOUS at 04:44

## 2024-05-02 RX ADMIN — AMPICILLIN SODIUM AND SULBACTAM SODIUM 3000 MG: 2; 1 INJECTION, POWDER, FOR SOLUTION INTRAMUSCULAR; INTRAVENOUS at 04:49

## 2024-05-02 RX ADMIN — ENOXAPARIN SODIUM 30 MG: 100 INJECTION SUBCUTANEOUS at 13:54

## 2024-05-02 RX ADMIN — SODIUM CHLORIDE, PRESERVATIVE FREE 10 ML: 5 INJECTION INTRAVENOUS at 09:31

## 2024-05-02 RX ADMIN — DEXTROSE AND SODIUM CHLORIDE: 5; 450 INJECTION, SOLUTION INTRAVENOUS at 09:29

## 2024-05-02 RX ADMIN — SODIUM CHLORIDE, PRESERVATIVE FREE 40 MG: 5 INJECTION INTRAVENOUS at 09:30

## 2024-05-02 RX ADMIN — LEVETIRACETAM 500 MG: 100 INJECTION, SOLUTION INTRAVENOUS at 16:04

## 2024-05-02 RX ADMIN — HYDRALAZINE HYDROCHLORIDE 10 MG: 20 INJECTION INTRAMUSCULAR; INTRAVENOUS at 11:53

## 2024-05-02 RX ADMIN — AMPICILLIN SODIUM AND SULBACTAM SODIUM 3000 MG: 2; 1 INJECTION, POWDER, FOR SOLUTION INTRAMUSCULAR; INTRAVENOUS at 10:17

## 2024-05-02 RX ADMIN — Medication 2 UNITS: at 07:03

## 2024-05-02 RX ADMIN — INSULIN GLARGINE 8 UNITS: 100 INJECTION, SOLUTION SUBCUTANEOUS at 09:23

## 2024-05-02 RX ADMIN — Medication 4 UNITS: at 12:01

## 2024-05-02 RX ADMIN — HYDRALAZINE HYDROCHLORIDE 10 MG: 20 INJECTION INTRAMUSCULAR; INTRAVENOUS at 04:50

## 2024-05-02 RX ADMIN — SODIUM CHLORIDE, PRESERVATIVE FREE 10 ML: 5 INJECTION INTRAVENOUS at 00:58

## 2024-05-02 RX ADMIN — AMPICILLIN SODIUM AND SULBACTAM SODIUM 3000 MG: 2; 1 INJECTION, POWDER, FOR SOLUTION INTRAMUSCULAR; INTRAVENOUS at 16:33

## 2024-05-02 RX ADMIN — ENOXAPARIN SODIUM 30 MG: 100 INJECTION SUBCUTANEOUS at 00:22

## 2024-05-02 RX ADMIN — Medication 4 UNITS: at 00:57

## 2024-05-02 RX ADMIN — SODIUM CHLORIDE, PRESERVATIVE FREE 10 ML: 5 INJECTION INTRAVENOUS at 22:15

## 2024-05-02 RX ADMIN — HALOPERIDOL LACTATE 4 MG: 5 INJECTION, SOLUTION INTRAMUSCULAR at 18:23

## 2024-05-02 ASSESSMENT — PAIN SCALES - GENERAL: PAINLEVEL_OUTOF10: 0

## 2024-05-02 ASSESSMENT — PAIN SCALES - WONG BAKER: WONGBAKER_NUMERICALRESPONSE: NO HURT

## 2024-05-02 NOTE — ACP (ADVANCE CARE PLANNING)
Advance Care Planning       Palliative Care Supportive Visit      Date of Visit: 05/02/24    Individuals present for the conversation: Patient's mother (Keisha Acosta), patient's wife (Coretta Pa), Crystal Hui NP (Palliative) and this writer.       ACP documents on file prior to discussion:  -None      Previously completed document/s not on file:  -None      Healthcare Decision Maker:      Primary Decision Maker: Coretta Pa - Spouse - [phone]       Conversation Summary:      This writer, along with Crystal Hui NP, with the Palliative Care team; visited with patient and his family today for support. Patient was laying in bed; appearing agitated. Patient's mother (Keisha) was brushing his hair and patient was agitated with his hair being brushed. Patient's wife (Coretta) was also at the bedside; comforting patient.     The Palliative Care team verified that patient's family has made the decision to move forward with a peg tube being placed. Family spoke about a special mushroom cap that had to be ordered; for the peg tube. Mom asked a few questions regarding patient's current nutrition; specifically the fluid with dextrose in it. She was asking why patient, who is diabetic, would be given \"sugar water\".     Mom was educated that patient needs the nutrition and calories. The dextrose fluid is an IV and set at a specific rate. Mom asked if there were any alternatives. She was made aware that there aren't any alternatives. Mom was reassured that patient's blood sugars are being closely monitored. At this time, patient will remain a FULL CODE WITH FULL INTERVENTIONS.      Resuscitation Status:   Code Status: Full Code       Documentation Completed:  -No new documents completed.      I spent 30 minutes with the patient and/or surrogate decision maker discussing the patient's wishes and goals.          Idris Lucero Jr., [facility]  Palliative Care   Ph#[phone]

## 2024-05-03 PROBLEM — E87.6 HYPOKALEMIA: Status: ACTIVE | Noted: 2024-05-03

## 2024-05-03 LAB
ANION GAP SERPL CALC-SCNC: 17 MMOL/L (ref 3–18)
BASOPHILS # BLD: 0 K/UL (ref 0–0.1)
BASOPHILS NFR BLD: 1 % (ref 0–2)
BUN SERPL-MCNC: 11 MG/DL (ref 7–18)
BUN/CREAT SERPL: 17 (ref 12–20)
CALCIUM SERPL-MCNC: 8.1 MG/DL (ref 8.5–10.1)
CHLORIDE SERPL-SCNC: 113 MMOL/L (ref 100–111)
CO2 SERPL-SCNC: 12 MMOL/L (ref 21–32)
CREAT SERPL-MCNC: 0.64 MG/DL (ref 0.6–1.3)
DIFFERENTIAL METHOD BLD: ABNORMAL
EOSINOPHIL # BLD: 0.1 K/UL (ref 0–0.4)
EOSINOPHIL NFR BLD: 2 % (ref 0–5)
ERYTHROCYTE [DISTWIDTH] IN BLOOD BY AUTOMATED COUNT: 13.1 % (ref 11.6–14.5)
GLUCOSE BLD STRIP.AUTO-MCNC: 228 MG/DL (ref 70–110)
GLUCOSE BLD STRIP.AUTO-MCNC: 252 MG/DL (ref 70–110)
GLUCOSE BLD STRIP.AUTO-MCNC: 260 MG/DL (ref 70–110)
GLUCOSE BLD STRIP.AUTO-MCNC: 274 MG/DL (ref 70–110)
GLUCOSE SERPL-MCNC: 278 MG/DL (ref 74–99)
HCT VFR BLD AUTO: 31.2 % (ref 36–48)
HGB BLD-MCNC: 10.5 G/DL (ref 13–16)
IMM GRANULOCYTES # BLD AUTO: 0 K/UL (ref 0–0.04)
IMM GRANULOCYTES NFR BLD AUTO: 1 % (ref 0–0.5)
LYMPHOCYTES # BLD: 1.6 K/UL (ref 0.9–3.6)
LYMPHOCYTES NFR BLD: 27 % (ref 21–52)
MAGNESIUM SERPL-MCNC: 1.2 MG/DL (ref 1.6–2.6)
MCH RBC QN AUTO: 30.5 PG (ref 24–34)
MCHC RBC AUTO-ENTMCNC: 33.7 G/DL (ref 31–37)
MCV RBC AUTO: 90.7 FL (ref 78–100)
MONOCYTES # BLD: 0.7 K/UL (ref 0.05–1.2)
MONOCYTES NFR BLD: 12 % (ref 3–10)
NEUTS SEG # BLD: 3.4 K/UL (ref 1.8–8)
NEUTS SEG NFR BLD: 58 % (ref 40–73)
NRBC # BLD: 0 K/UL (ref 0–0.01)
NRBC BLD-RTO: 0 PER 100 WBC
PLATELET # BLD AUTO: 366 K/UL (ref 135–420)
PMV BLD AUTO: 10 FL (ref 9.2–11.8)
POTASSIUM SERPL-SCNC: 3.4 MMOL/L (ref 3.5–5.5)
RBC # BLD AUTO: 3.44 M/UL (ref 4.35–5.65)
SODIUM SERPL-SCNC: 142 MMOL/L (ref 136–145)
WBC # BLD AUTO: 5.9 K/UL (ref 4.6–13.2)

## 2024-05-03 PROCEDURE — 83735 ASSAY OF MAGNESIUM: CPT

## 2024-05-03 PROCEDURE — 2580000003 HC RX 258: Performed by: INTERNAL MEDICINE

## 2024-05-03 PROCEDURE — 6370000000 HC RX 637 (ALT 250 FOR IP): Performed by: INTERNAL MEDICINE

## 2024-05-03 PROCEDURE — 6360000002 HC RX W HCPCS: Performed by: INTERNAL MEDICINE

## 2024-05-03 PROCEDURE — 85025 COMPLETE CBC W/AUTO DIFF WBC: CPT

## 2024-05-03 PROCEDURE — A4216 STERILE WATER/SALINE, 10 ML: HCPCS | Performed by: HOSPITALIST

## 2024-05-03 PROCEDURE — C9113 INJ PANTOPRAZOLE SODIUM, VIA: HCPCS | Performed by: HOSPITALIST

## 2024-05-03 PROCEDURE — 2500000003 HC RX 250 WO HCPCS: Performed by: INTERNAL MEDICINE

## 2024-05-03 PROCEDURE — 6370000000 HC RX 637 (ALT 250 FOR IP): Performed by: FAMILY MEDICINE

## 2024-05-03 PROCEDURE — 80048 BASIC METABOLIC PNL TOTAL CA: CPT

## 2024-05-03 PROCEDURE — 2580000003 HC RX 258: Performed by: HOSPITALIST

## 2024-05-03 PROCEDURE — 1100000000 HC RM PRIVATE

## 2024-05-03 PROCEDURE — 97110 THERAPEUTIC EXERCISES: CPT

## 2024-05-03 PROCEDURE — 2500000003 HC RX 250 WO HCPCS: Performed by: HOSPITALIST

## 2024-05-03 PROCEDURE — 6360000002 HC RX W HCPCS: Performed by: HOSPITALIST

## 2024-05-03 PROCEDURE — 36415 COLL VENOUS BLD VENIPUNCTURE: CPT

## 2024-05-03 PROCEDURE — 97530 THERAPEUTIC ACTIVITIES: CPT

## 2024-05-03 PROCEDURE — 82962 GLUCOSE BLOOD TEST: CPT

## 2024-05-03 RX ORDER — POTASSIUM CHLORIDE 7.45 MG/ML
10 INJECTION INTRAVENOUS
Status: COMPLETED | OUTPATIENT
Start: 2024-05-03 | End: 2024-05-03

## 2024-05-03 RX ORDER — HYDRALAZINE HYDROCHLORIDE 20 MG/ML
20 INJECTION INTRAMUSCULAR; INTRAVENOUS EVERY 6 HOURS
Status: DISCONTINUED | OUTPATIENT
Start: 2024-05-03 | End: 2024-05-04

## 2024-05-03 RX ORDER — MIDAZOLAM HYDROCHLORIDE 2 MG/2ML
0.5 INJECTION, SOLUTION INTRAMUSCULAR; INTRAVENOUS EVERY 4 HOURS PRN
Status: DISCONTINUED | OUTPATIENT
Start: 2024-05-03 | End: 2024-05-07 | Stop reason: HOSPADM

## 2024-05-03 RX ORDER — MAGNESIUM SULFATE HEPTAHYDRATE 40 MG/ML
2000 INJECTION, SOLUTION INTRAVENOUS
Status: COMPLETED | OUTPATIENT
Start: 2024-05-03 | End: 2024-05-03

## 2024-05-03 RX ORDER — HALOPERIDOL 5 MG/ML
1 INJECTION INTRAMUSCULAR EVERY 6 HOURS PRN
Status: DISCONTINUED | OUTPATIENT
Start: 2024-05-03 | End: 2024-05-09 | Stop reason: HOSPADM

## 2024-05-03 RX ADMIN — Medication 4 UNITS: at 23:50

## 2024-05-03 RX ADMIN — FOLIC ACID: 5 INJECTION, SOLUTION INTRAMUSCULAR; INTRAVENOUS; SUBCUTANEOUS at 08:53

## 2024-05-03 RX ADMIN — LEVETIRACETAM 500 MG: 100 INJECTION, SOLUTION INTRAVENOUS at 03:53

## 2024-05-03 RX ADMIN — MAGNESIUM SULFATE HEPTAHYDRATE 2000 MG: 40 INJECTION, SOLUTION INTRAVENOUS at 08:26

## 2024-05-03 RX ADMIN — INSULIN GLARGINE 8 UNITS: 100 INJECTION, SOLUTION SUBCUTANEOUS at 08:32

## 2024-05-03 RX ADMIN — ENOXAPARIN SODIUM 30 MG: 100 INJECTION SUBCUTANEOUS at 14:43

## 2024-05-03 RX ADMIN — POTASSIUM CHLORIDE 10 MEQ: 7.46 INJECTION, SOLUTION INTRAVENOUS at 09:33

## 2024-05-03 RX ADMIN — SODIUM CHLORIDE, PRESERVATIVE FREE 10 ML: 5 INJECTION INTRAVENOUS at 21:51

## 2024-05-03 RX ADMIN — LEVETIRACETAM 500 MG: 100 INJECTION, SOLUTION INTRAVENOUS at 16:41

## 2024-05-03 RX ADMIN — HYDRALAZINE HYDROCHLORIDE 20 MG: 20 INJECTION, SOLUTION INTRAMUSCULAR; INTRAVENOUS at 11:23

## 2024-05-03 RX ADMIN — MAGNESIUM SULFATE HEPTAHYDRATE 2000 MG: 40 INJECTION, SOLUTION INTRAVENOUS at 12:00

## 2024-05-03 RX ADMIN — POTASSIUM CHLORIDE 10 MEQ: 7.45 INJECTION INTRAVENOUS at 12:10

## 2024-05-03 RX ADMIN — SODIUM CHLORIDE, PRESERVATIVE FREE 40 MG: 5 INJECTION INTRAVENOUS at 08:32

## 2024-05-03 RX ADMIN — POTASSIUM CHLORIDE 10 MEQ: 7.46 INJECTION, SOLUTION INTRAVENOUS at 12:11

## 2024-05-03 RX ADMIN — Medication 4 UNITS: at 17:37

## 2024-05-03 RX ADMIN — POTASSIUM CHLORIDE 10 MEQ: 7.46 INJECTION, SOLUTION INTRAVENOUS at 10:54

## 2024-05-03 RX ADMIN — SODIUM CHLORIDE, PRESERVATIVE FREE 10 ML: 5 INJECTION INTRAVENOUS at 08:33

## 2024-05-03 RX ADMIN — MAGNESIUM SULFATE HEPTAHYDRATE 2000 MG: 40 INJECTION, SOLUTION INTRAVENOUS at 11:24

## 2024-05-03 RX ADMIN — HYDRALAZINE HYDROCHLORIDE 20 MG: 20 INJECTION, SOLUTION INTRAMUSCULAR; INTRAVENOUS at 23:28

## 2024-05-03 RX ADMIN — HYDRALAZINE HYDROCHLORIDE 20 MG: 20 INJECTION, SOLUTION INTRAMUSCULAR; INTRAVENOUS at 17:37

## 2024-05-03 RX ADMIN — POTASSIUM CHLORIDE 10 MEQ: 7.46 INJECTION, SOLUTION INTRAVENOUS at 11:00

## 2024-05-03 RX ADMIN — DEXTROSE AND SODIUM CHLORIDE: 5; 450 INJECTION, SOLUTION INTRAVENOUS at 14:45

## 2024-05-03 RX ADMIN — AMPICILLIN SODIUM AND SULBACTAM SODIUM 3000 MG: 2; 1 INJECTION, POWDER, FOR SOLUTION INTRAMUSCULAR; INTRAVENOUS at 04:50

## 2024-05-03 RX ADMIN — Medication 4 UNITS: at 12:13

## 2024-05-03 RX ADMIN — HYDRALAZINE HYDROCHLORIDE 10 MG: 20 INJECTION INTRAMUSCULAR; INTRAVENOUS at 06:13

## 2024-05-03 RX ADMIN — DEXTROSE AND SODIUM CHLORIDE: 5; 450 INJECTION, SOLUTION INTRAVENOUS at 03:53

## 2024-05-03 RX ADMIN — ENOXAPARIN SODIUM 30 MG: 100 INJECTION SUBCUTANEOUS at 02:13

## 2024-05-03 RX ADMIN — Medication 4 UNITS: at 00:01

## 2024-05-03 RX ADMIN — Medication 4 UNITS: at 06:13

## 2024-05-04 LAB
AMMONIA PLAS-SCNC: <10 UMOL/L (ref 11–32)
GLUCOSE BLD STRIP.AUTO-MCNC: 274 MG/DL (ref 70–110)
GLUCOSE BLD STRIP.AUTO-MCNC: 290 MG/DL (ref 70–110)
GLUCOSE BLD STRIP.AUTO-MCNC: 292 MG/DL (ref 70–110)
GLUCOSE BLD STRIP.AUTO-MCNC: 294 MG/DL (ref 70–110)

## 2024-05-04 PROCEDURE — 2500000003 HC RX 250 WO HCPCS: Performed by: INTERNAL MEDICINE

## 2024-05-04 PROCEDURE — 6370000000 HC RX 637 (ALT 250 FOR IP): Performed by: FAMILY MEDICINE

## 2024-05-04 PROCEDURE — 2580000003 HC RX 258: Performed by: HOSPITALIST

## 2024-05-04 PROCEDURE — 1100000000 HC RM PRIVATE

## 2024-05-04 PROCEDURE — 36415 COLL VENOUS BLD VENIPUNCTURE: CPT

## 2024-05-04 PROCEDURE — A4216 STERILE WATER/SALINE, 10 ML: HCPCS | Performed by: HOSPITALIST

## 2024-05-04 PROCEDURE — C9113 INJ PANTOPRAZOLE SODIUM, VIA: HCPCS | Performed by: HOSPITALIST

## 2024-05-04 PROCEDURE — 82140 ASSAY OF AMMONIA: CPT

## 2024-05-04 PROCEDURE — 6360000002 HC RX W HCPCS: Performed by: INTERNAL MEDICINE

## 2024-05-04 PROCEDURE — 82962 GLUCOSE BLOOD TEST: CPT

## 2024-05-04 PROCEDURE — 6360000002 HC RX W HCPCS: Performed by: HOSPITALIST

## 2024-05-04 PROCEDURE — 2580000003 HC RX 258: Performed by: INTERNAL MEDICINE

## 2024-05-04 PROCEDURE — 6370000000 HC RX 637 (ALT 250 FOR IP): Performed by: INTERNAL MEDICINE

## 2024-05-04 RX ORDER — METOPROLOL TARTRATE 1 MG/ML
2.5 INJECTION, SOLUTION INTRAVENOUS EVERY 4 HOURS
Status: DISCONTINUED | OUTPATIENT
Start: 2024-05-04 | End: 2024-05-09

## 2024-05-04 RX ORDER — METOPROLOL TARTRATE 1 MG/ML
5 INJECTION, SOLUTION INTRAVENOUS EVERY 6 HOURS
Status: DISCONTINUED | OUTPATIENT
Start: 2024-05-04 | End: 2024-05-04

## 2024-05-04 RX ORDER — INSULIN GLARGINE 100 [IU]/ML
10 INJECTION, SOLUTION SUBCUTANEOUS DAILY
Status: DISCONTINUED | OUTPATIENT
Start: 2024-05-05 | End: 2024-05-05

## 2024-05-04 RX ORDER — HYDRALAZINE HYDROCHLORIDE 20 MG/ML
10 INJECTION INTRAMUSCULAR; INTRAVENOUS EVERY 4 HOURS PRN
Status: DISCONTINUED | OUTPATIENT
Start: 2024-05-04 | End: 2024-05-09 | Stop reason: HOSPADM

## 2024-05-04 RX ADMIN — Medication 4 UNITS: at 05:50

## 2024-05-04 RX ADMIN — HYDRALAZINE HYDROCHLORIDE 20 MG: 20 INJECTION, SOLUTION INTRAMUSCULAR; INTRAVENOUS at 12:41

## 2024-05-04 RX ADMIN — METOPROLOL TARTRATE 2.5 MG: 5 INJECTION INTRAVENOUS at 21:23

## 2024-05-04 RX ADMIN — SODIUM CHLORIDE, PRESERVATIVE FREE 10 ML: 5 INJECTION INTRAVENOUS at 09:26

## 2024-05-04 RX ADMIN — SODIUM CHLORIDE, PRESERVATIVE FREE 40 MG: 5 INJECTION INTRAVENOUS at 09:26

## 2024-05-04 RX ADMIN — HYDRALAZINE HYDROCHLORIDE 20 MG: 20 INJECTION, SOLUTION INTRAMUSCULAR; INTRAVENOUS at 05:51

## 2024-05-04 RX ADMIN — FOLIC ACID: 5 INJECTION, SOLUTION INTRAMUSCULAR; INTRAVENOUS; SUBCUTANEOUS at 12:42

## 2024-05-04 RX ADMIN — Medication 4 UNITS: at 19:40

## 2024-05-04 RX ADMIN — Medication 4 UNITS: at 12:40

## 2024-05-04 RX ADMIN — LEVETIRACETAM 500 MG: 100 INJECTION, SOLUTION INTRAVENOUS at 03:54

## 2024-05-04 RX ADMIN — DEXTROSE AND SODIUM CHLORIDE: 5; 450 INJECTION, SOLUTION INTRAVENOUS at 02:57

## 2024-05-04 RX ADMIN — LEVETIRACETAM 500 MG: 100 INJECTION, SOLUTION INTRAVENOUS at 17:14

## 2024-05-04 RX ADMIN — METOPROLOL TARTRATE 2.5 MG: 5 INJECTION INTRAVENOUS at 15:25

## 2024-05-04 RX ADMIN — INSULIN GLARGINE 8 UNITS: 100 INJECTION, SOLUTION SUBCUTANEOUS at 09:25

## 2024-05-04 RX ADMIN — ENOXAPARIN SODIUM 30 MG: 100 INJECTION SUBCUTANEOUS at 15:26

## 2024-05-04 RX ADMIN — SODIUM CHLORIDE, PRESERVATIVE FREE 10 ML: 5 INJECTION INTRAVENOUS at 21:23

## 2024-05-04 RX ADMIN — ENOXAPARIN SODIUM 30 MG: 100 INJECTION SUBCUTANEOUS at 01:20

## 2024-05-04 ASSESSMENT — PAIN SCALES - WONG BAKER: WONGBAKER_NUMERICALRESPONSE: NO HURT

## 2024-05-05 LAB
ALBUMIN SERPL-MCNC: 2.9 G/DL (ref 3.4–5)
ALBUMIN/GLOB SERPL: 0.8 (ref 0.8–1.7)
ALP SERPL-CCNC: 72 U/L (ref 45–117)
ALT SERPL-CCNC: 11 U/L (ref 16–61)
ANION GAP SERPL CALC-SCNC: 11 MMOL/L (ref 3–18)
AST SERPL-CCNC: 16 U/L (ref 10–38)
BASOPHILS # BLD: 0 K/UL (ref 0–0.1)
BASOPHILS NFR BLD: 1 % (ref 0–2)
BILIRUB SERPL-MCNC: 0.5 MG/DL (ref 0.2–1)
BUN SERPL-MCNC: 16 MG/DL (ref 7–18)
BUN/CREAT SERPL: 23 (ref 12–20)
CALCIUM SERPL-MCNC: 9.9 MG/DL (ref 8.5–10.1)
CHLORIDE SERPL-SCNC: 107 MMOL/L (ref 100–111)
CO2 SERPL-SCNC: 19 MMOL/L (ref 21–32)
CREAT SERPL-MCNC: 0.7 MG/DL (ref 0.6–1.3)
DIFFERENTIAL METHOD BLD: ABNORMAL
EOSINOPHIL # BLD: 0.1 K/UL (ref 0–0.4)
EOSINOPHIL NFR BLD: 3 % (ref 0–5)
ERYTHROCYTE [DISTWIDTH] IN BLOOD BY AUTOMATED COUNT: 13.4 % (ref 11.6–14.5)
GLOBULIN SER CALC-MCNC: 3.8 G/DL (ref 2–4)
GLUCOSE BLD STRIP.AUTO-MCNC: 246 MG/DL (ref 70–110)
GLUCOSE BLD STRIP.AUTO-MCNC: 254 MG/DL (ref 70–110)
GLUCOSE BLD STRIP.AUTO-MCNC: 264 MG/DL (ref 70–110)
GLUCOSE BLD STRIP.AUTO-MCNC: 268 MG/DL (ref 70–110)
GLUCOSE BLD STRIP.AUTO-MCNC: 291 MG/DL (ref 70–110)
GLUCOSE BLD STRIP.AUTO-MCNC: 304 MG/DL (ref 70–110)
GLUCOSE BLD STRIP.AUTO-MCNC: 304 MG/DL (ref 70–110)
HCT VFR BLD AUTO: 38.5 % (ref 36–48)
HGB BLD-MCNC: 12.9 G/DL (ref 13–16)
IMM GRANULOCYTES # BLD AUTO: 0 K/UL (ref 0–0.04)
IMM GRANULOCYTES NFR BLD AUTO: 1 % (ref 0–0.5)
LYMPHOCYTES # BLD: 1.9 K/UL (ref 0.9–3.6)
LYMPHOCYTES NFR BLD: 39 % (ref 21–52)
MCH RBC QN AUTO: 30.5 PG (ref 24–34)
MCHC RBC AUTO-ENTMCNC: 33.5 G/DL (ref 31–37)
MCV RBC AUTO: 91 FL (ref 78–100)
MONOCYTES # BLD: 0.7 K/UL (ref 0.05–1.2)
MONOCYTES NFR BLD: 15 % (ref 3–10)
NEUTS SEG # BLD: 2 K/UL (ref 1.8–8)
NEUTS SEG NFR BLD: 41 % (ref 40–73)
NRBC # BLD: 0 K/UL (ref 0–0.01)
NRBC BLD-RTO: 0 PER 100 WBC
PLATELET # BLD AUTO: 410 K/UL (ref 135–420)
PMV BLD AUTO: 11 FL (ref 9.2–11.8)
POTASSIUM SERPL-SCNC: 4.2 MMOL/L (ref 3.5–5.5)
PROT SERPL-MCNC: 6.7 G/DL (ref 6.4–8.2)
RBC # BLD AUTO: 4.23 M/UL (ref 4.35–5.65)
SODIUM SERPL-SCNC: 137 MMOL/L (ref 136–145)
WBC # BLD AUTO: 4.7 K/UL (ref 4.6–13.2)

## 2024-05-05 PROCEDURE — 6360000002 HC RX W HCPCS: Performed by: INTERNAL MEDICINE

## 2024-05-05 PROCEDURE — 2500000003 HC RX 250 WO HCPCS: Performed by: INTERNAL MEDICINE

## 2024-05-05 PROCEDURE — 97110 THERAPEUTIC EXERCISES: CPT

## 2024-05-05 PROCEDURE — 6370000000 HC RX 637 (ALT 250 FOR IP): Performed by: INTERNAL MEDICINE

## 2024-05-05 PROCEDURE — 2580000003 HC RX 258: Performed by: HOSPITALIST

## 2024-05-05 PROCEDURE — C9113 INJ PANTOPRAZOLE SODIUM, VIA: HCPCS | Performed by: HOSPITALIST

## 2024-05-05 PROCEDURE — 6360000002 HC RX W HCPCS: Performed by: HOSPITALIST

## 2024-05-05 PROCEDURE — 85025 COMPLETE CBC W/AUTO DIFF WBC: CPT

## 2024-05-05 PROCEDURE — 36415 COLL VENOUS BLD VENIPUNCTURE: CPT

## 2024-05-05 PROCEDURE — 82962 GLUCOSE BLOOD TEST: CPT

## 2024-05-05 PROCEDURE — A4216 STERILE WATER/SALINE, 10 ML: HCPCS | Performed by: HOSPITALIST

## 2024-05-05 PROCEDURE — 1100000000 HC RM PRIVATE

## 2024-05-05 PROCEDURE — 80053 COMPREHEN METABOLIC PANEL: CPT

## 2024-05-05 PROCEDURE — 2580000003 HC RX 258: Performed by: INTERNAL MEDICINE

## 2024-05-05 PROCEDURE — 6370000000 HC RX 637 (ALT 250 FOR IP): Performed by: FAMILY MEDICINE

## 2024-05-05 RX ORDER — INSULIN GLARGINE 100 [IU]/ML
12 INJECTION, SOLUTION SUBCUTANEOUS DAILY
Status: DISCONTINUED | OUTPATIENT
Start: 2024-05-06 | End: 2024-05-09 | Stop reason: HOSPADM

## 2024-05-05 RX ADMIN — INSULIN GLARGINE 10 UNITS: 100 INJECTION, SOLUTION SUBCUTANEOUS at 08:59

## 2024-05-05 RX ADMIN — ENOXAPARIN SODIUM 30 MG: 100 INJECTION SUBCUTANEOUS at 00:53

## 2024-05-05 RX ADMIN — Medication 6 UNITS: at 12:05

## 2024-05-05 RX ADMIN — FOLIC ACID: 5 INJECTION, SOLUTION INTRAMUSCULAR; INTRAVENOUS; SUBCUTANEOUS at 08:58

## 2024-05-05 RX ADMIN — ENOXAPARIN SODIUM 30 MG: 100 INJECTION SUBCUTANEOUS at 12:06

## 2024-05-05 RX ADMIN — ENOXAPARIN SODIUM 30 MG: 100 INJECTION SUBCUTANEOUS at 23:46

## 2024-05-05 RX ADMIN — LEVETIRACETAM 500 MG: 100 INJECTION, SOLUTION INTRAVENOUS at 05:16

## 2024-05-05 RX ADMIN — LEVETIRACETAM 500 MG: 100 INJECTION, SOLUTION INTRAVENOUS at 16:57

## 2024-05-05 RX ADMIN — METOPROLOL TARTRATE 2.5 MG: 5 INJECTION INTRAVENOUS at 12:07

## 2024-05-05 RX ADMIN — Medication 4 UNITS: at 17:39

## 2024-05-05 RX ADMIN — Medication 6 UNITS: at 00:53

## 2024-05-05 RX ADMIN — METOPROLOL TARTRATE 2.5 MG: 5 INJECTION INTRAVENOUS at 23:45

## 2024-05-05 RX ADMIN — DEXTROSE AND SODIUM CHLORIDE: 5; 450 INJECTION, SOLUTION INTRAVENOUS at 16:57

## 2024-05-05 RX ADMIN — SODIUM CHLORIDE, PRESERVATIVE FREE 10 ML: 5 INJECTION INTRAVENOUS at 20:08

## 2024-05-05 RX ADMIN — Medication 4 UNITS: at 23:46

## 2024-05-05 RX ADMIN — Medication 2 UNITS: at 06:15

## 2024-05-05 RX ADMIN — METOPROLOL TARTRATE 2.5 MG: 5 INJECTION INTRAVENOUS at 20:08

## 2024-05-05 RX ADMIN — METOPROLOL TARTRATE 2.5 MG: 5 INJECTION INTRAVENOUS at 06:09

## 2024-05-05 RX ADMIN — METOPROLOL TARTRATE 2.5 MG: 5 INJECTION INTRAVENOUS at 00:54

## 2024-05-05 RX ADMIN — SODIUM CHLORIDE, PRESERVATIVE FREE 10 ML: 5 INJECTION INTRAVENOUS at 09:06

## 2024-05-05 RX ADMIN — METOPROLOL TARTRATE 2.5 MG: 5 INJECTION INTRAVENOUS at 08:59

## 2024-05-05 RX ADMIN — METOPROLOL TARTRATE 2.5 MG: 5 INJECTION INTRAVENOUS at 16:56

## 2024-05-05 RX ADMIN — SODIUM CHLORIDE, PRESERVATIVE FREE 40 MG: 5 INJECTION INTRAVENOUS at 08:59

## 2024-05-06 LAB
ANION GAP SERPL CALC-SCNC: 9 MMOL/L (ref 3–18)
BUN SERPL-MCNC: 16 MG/DL (ref 7–18)
BUN/CREAT SERPL: 21 (ref 12–20)
CALCIUM SERPL-MCNC: 10.1 MG/DL (ref 8.5–10.1)
CHLORIDE SERPL-SCNC: 106 MMOL/L (ref 100–111)
CO2 SERPL-SCNC: 22 MMOL/L (ref 21–32)
CREAT SERPL-MCNC: 0.77 MG/DL (ref 0.6–1.3)
GLUCOSE BLD STRIP.AUTO-MCNC: 236 MG/DL (ref 70–110)
GLUCOSE BLD STRIP.AUTO-MCNC: 252 MG/DL (ref 70–110)
GLUCOSE BLD STRIP.AUTO-MCNC: 257 MG/DL (ref 70–110)
GLUCOSE BLD STRIP.AUTO-MCNC: 274 MG/DL (ref 70–110)
GLUCOSE BLD STRIP.AUTO-MCNC: 281 MG/DL (ref 70–110)
GLUCOSE BLD STRIP.AUTO-MCNC: 294 MG/DL (ref 70–110)
GLUCOSE SERPL-MCNC: 315 MG/DL (ref 74–99)
POTASSIUM SERPL-SCNC: 3.9 MMOL/L (ref 3.5–5.5)
SODIUM SERPL-SCNC: 137 MMOL/L (ref 136–145)

## 2024-05-06 PROCEDURE — 2580000003 HC RX 258: Performed by: HOSPITALIST

## 2024-05-06 PROCEDURE — 2500000003 HC RX 250 WO HCPCS: Performed by: INTERNAL MEDICINE

## 2024-05-06 PROCEDURE — 1100000000 HC RM PRIVATE

## 2024-05-06 PROCEDURE — 6370000000 HC RX 637 (ALT 250 FOR IP): Performed by: INTERNAL MEDICINE

## 2024-05-06 PROCEDURE — 36415 COLL VENOUS BLD VENIPUNCTURE: CPT

## 2024-05-06 PROCEDURE — 2580000003 HC RX 258: Performed by: INTERNAL MEDICINE

## 2024-05-06 PROCEDURE — 6360000002 HC RX W HCPCS: Performed by: INTERNAL MEDICINE

## 2024-05-06 PROCEDURE — 80048 BASIC METABOLIC PNL TOTAL CA: CPT

## 2024-05-06 PROCEDURE — 82962 GLUCOSE BLOOD TEST: CPT

## 2024-05-06 PROCEDURE — A4216 STERILE WATER/SALINE, 10 ML: HCPCS | Performed by: HOSPITALIST

## 2024-05-06 PROCEDURE — 6370000000 HC RX 637 (ALT 250 FOR IP): Performed by: FAMILY MEDICINE

## 2024-05-06 PROCEDURE — 6370000000 HC RX 637 (ALT 250 FOR IP): Performed by: HOSPITALIST

## 2024-05-06 PROCEDURE — 97110 THERAPEUTIC EXERCISES: CPT

## 2024-05-06 PROCEDURE — C9113 INJ PANTOPRAZOLE SODIUM, VIA: HCPCS | Performed by: HOSPITALIST

## 2024-05-06 PROCEDURE — 6360000002 HC RX W HCPCS: Performed by: HOSPITALIST

## 2024-05-06 RX ORDER — INSULIN LISPRO 100 [IU]/ML
3 INJECTION, SOLUTION INTRAVENOUS; SUBCUTANEOUS EVERY 6 HOURS
Status: DISCONTINUED | OUTPATIENT
Start: 2024-05-06 | End: 2024-05-09 | Stop reason: HOSPADM

## 2024-05-06 RX ADMIN — INSULIN LISPRO 3 UNITS: 100 INJECTION, SOLUTION INTRAVENOUS; SUBCUTANEOUS at 23:27

## 2024-05-06 RX ADMIN — Medication 4 UNITS: at 17:08

## 2024-05-06 RX ADMIN — INSULIN LISPRO 3 UNITS: 100 INJECTION, SOLUTION INTRAVENOUS; SUBCUTANEOUS at 17:07

## 2024-05-06 RX ADMIN — INSULIN GLARGINE 12 UNITS: 100 INJECTION, SOLUTION SUBCUTANEOUS at 08:52

## 2024-05-06 RX ADMIN — METOPROLOL TARTRATE 2.5 MG: 5 INJECTION INTRAVENOUS at 21:15

## 2024-05-06 RX ADMIN — LEVETIRACETAM 500 MG: 100 INJECTION, SOLUTION INTRAVENOUS at 05:06

## 2024-05-06 RX ADMIN — FOLIC ACID: 5 INJECTION, SOLUTION INTRAMUSCULAR; INTRAVENOUS; SUBCUTANEOUS at 10:44

## 2024-05-06 RX ADMIN — SODIUM CHLORIDE, PRESERVATIVE FREE 10 ML: 5 INJECTION INTRAVENOUS at 10:44

## 2024-05-06 RX ADMIN — METOPROLOL TARTRATE 2.5 MG: 5 INJECTION INTRAVENOUS at 16:05

## 2024-05-06 RX ADMIN — LEVETIRACETAM 500 MG: 100 INJECTION, SOLUTION INTRAVENOUS at 16:07

## 2024-05-06 RX ADMIN — Medication 4 UNITS: at 12:44

## 2024-05-06 RX ADMIN — METOPROLOL TARTRATE 2.5 MG: 5 INJECTION INTRAVENOUS at 12:44

## 2024-05-06 RX ADMIN — DEXTROSE AND SODIUM CHLORIDE: 5; 450 INJECTION, SOLUTION INTRAVENOUS at 05:01

## 2024-05-06 RX ADMIN — SODIUM CHLORIDE, PRESERVATIVE FREE 10 ML: 5 INJECTION INTRAVENOUS at 21:16

## 2024-05-06 RX ADMIN — METOPROLOL TARTRATE 2.5 MG: 5 INJECTION INTRAVENOUS at 05:07

## 2024-05-06 RX ADMIN — INSULIN LISPRO 3 UNITS: 100 INJECTION, SOLUTION INTRAVENOUS; SUBCUTANEOUS at 12:43

## 2024-05-06 RX ADMIN — SODIUM CHLORIDE, PRESERVATIVE FREE 40 MG: 5 INJECTION INTRAVENOUS at 08:52

## 2024-05-06 RX ADMIN — Medication 4 UNITS: at 21:50

## 2024-05-06 RX ADMIN — Medication 4 UNITS: at 05:15

## 2024-05-06 RX ADMIN — METOPROLOL TARTRATE 2.5 MG: 5 INJECTION INTRAVENOUS at 08:52

## 2024-05-06 RX ADMIN — ENOXAPARIN SODIUM 30 MG: 100 INJECTION SUBCUTANEOUS at 12:43

## 2024-05-07 ENCOUNTER — ANESTHESIA (OUTPATIENT)
Facility: HOSPITAL | Age: 39
DRG: 207 | End: 2024-05-07
Payer: COMMERCIAL

## 2024-05-07 ENCOUNTER — ANESTHESIA EVENT (OUTPATIENT)
Facility: HOSPITAL | Age: 39
DRG: 207 | End: 2024-05-07
Payer: COMMERCIAL

## 2024-05-07 PROBLEM — E11.9 DM TYPE 2 (DIABETES MELLITUS, TYPE 2) (HCC): Status: ACTIVE | Noted: 2024-05-07

## 2024-05-07 LAB
GLUCOSE BLD STRIP.AUTO-MCNC: 250 MG/DL (ref 70–110)
GLUCOSE BLD STRIP.AUTO-MCNC: 251 MG/DL (ref 70–110)
GLUCOSE BLD STRIP.AUTO-MCNC: 252 MG/DL (ref 70–110)
GLUCOSE BLD STRIP.AUTO-MCNC: 256 MG/DL (ref 70–110)

## 2024-05-07 PROCEDURE — 2580000003 HC RX 258: Performed by: HOSPITALIST

## 2024-05-07 PROCEDURE — 6360000002 HC RX W HCPCS: Performed by: INTERNAL MEDICINE

## 2024-05-07 PROCEDURE — 3E0G76Z INTRODUCTION OF NUTRITIONAL SUBSTANCE INTO UPPER GI, VIA NATURAL OR ARTIFICIAL OPENING: ICD-10-PCS | Performed by: INTERNAL MEDICINE

## 2024-05-07 PROCEDURE — 2709999900 HC NON-CHARGEABLE SUPPLY: Performed by: INTERNAL MEDICINE

## 2024-05-07 PROCEDURE — 2580000003 HC RX 258: Performed by: INTERNAL MEDICINE

## 2024-05-07 PROCEDURE — 51798 US URINE CAPACITY MEASURE: CPT

## 2024-05-07 PROCEDURE — 0DH63UZ INSERTION OF FEEDING DEVICE INTO STOMACH, PERCUTANEOUS APPROACH: ICD-10-PCS | Performed by: INTERNAL MEDICINE

## 2024-05-07 PROCEDURE — 6370000000 HC RX 637 (ALT 250 FOR IP): Performed by: INTERNAL MEDICINE

## 2024-05-07 PROCEDURE — 2500000003 HC RX 250 WO HCPCS: Performed by: INTERNAL MEDICINE

## 2024-05-07 PROCEDURE — 3700000000 HC ANESTHESIA ATTENDED CARE: Performed by: INTERNAL MEDICINE

## 2024-05-07 PROCEDURE — 3700000001 HC ADD 15 MINUTES (ANESTHESIA): Performed by: INTERNAL MEDICINE

## 2024-05-07 PROCEDURE — A4216 STERILE WATER/SALINE, 10 ML: HCPCS | Performed by: HOSPITALIST

## 2024-05-07 PROCEDURE — 3600007512: Performed by: INTERNAL MEDICINE

## 2024-05-07 PROCEDURE — 6370000000 HC RX 637 (ALT 250 FOR IP): Performed by: FAMILY MEDICINE

## 2024-05-07 PROCEDURE — 82962 GLUCOSE BLOOD TEST: CPT

## 2024-05-07 PROCEDURE — C9113 INJ PANTOPRAZOLE SODIUM, VIA: HCPCS | Performed by: HOSPITALIST

## 2024-05-07 PROCEDURE — 1100000000 HC RM PRIVATE

## 2024-05-07 PROCEDURE — 6360000002 HC RX W HCPCS: Performed by: NURSE ANESTHETIST, CERTIFIED REGISTERED

## 2024-05-07 PROCEDURE — 6370000000 HC RX 637 (ALT 250 FOR IP): Performed by: HOSPITALIST

## 2024-05-07 PROCEDURE — 6360000002 HC RX W HCPCS: Performed by: HOSPITALIST

## 2024-05-07 PROCEDURE — 3600007502: Performed by: INTERNAL MEDICINE

## 2024-05-07 RX ORDER — MEPERIDINE HYDROCHLORIDE 50 MG/ML
12.5 INJECTION INTRAMUSCULAR; INTRAVENOUS; SUBCUTANEOUS ONCE
Status: DISCONTINUED | OUTPATIENT
Start: 2024-05-07 | End: 2024-05-07 | Stop reason: HOSPADM

## 2024-05-07 RX ORDER — LABETALOL HYDROCHLORIDE 5 MG/ML
10 INJECTION, SOLUTION INTRAVENOUS
Status: DISCONTINUED | OUTPATIENT
Start: 2024-05-07 | End: 2024-05-07 | Stop reason: HOSPADM

## 2024-05-07 RX ORDER — SODIUM CHLORIDE 0.9 % (FLUSH) 0.9 %
5-40 SYRINGE (ML) INJECTION EVERY 12 HOURS SCHEDULED
Status: DISCONTINUED | OUTPATIENT
Start: 2024-05-07 | End: 2024-05-09 | Stop reason: HOSPADM

## 2024-05-07 RX ORDER — SODIUM CHLORIDE 9 MG/ML
INJECTION, SOLUTION INTRAVENOUS PRN
Status: DISCONTINUED | OUTPATIENT
Start: 2024-05-07 | End: 2024-05-09 | Stop reason: HOSPADM

## 2024-05-07 RX ORDER — LIDOCAINE HYDROCHLORIDE 10 MG/ML
INJECTION, SOLUTION EPIDURAL; INFILTRATION; INTRACAUDAL; PERINEURAL PRN
Status: DISCONTINUED | OUTPATIENT
Start: 2024-05-07 | End: 2024-05-07 | Stop reason: ALTCHOICE

## 2024-05-07 RX ORDER — LORAZEPAM 2 MG/ML
0.5 INJECTION INTRAMUSCULAR ONCE
Status: DISCONTINUED | OUTPATIENT
Start: 2024-05-07 | End: 2024-05-07 | Stop reason: HOSPADM

## 2024-05-07 RX ORDER — NALOXONE HYDROCHLORIDE 0.4 MG/ML
INJECTION, SOLUTION INTRAMUSCULAR; INTRAVENOUS; SUBCUTANEOUS PRN
Status: DISCONTINUED | OUTPATIENT
Start: 2024-05-07 | End: 2024-05-09 | Stop reason: HOSPADM

## 2024-05-07 RX ORDER — FENTANYL CITRATE 50 UG/ML
25 INJECTION, SOLUTION INTRAMUSCULAR; INTRAVENOUS EVERY 5 MIN PRN
Status: DISCONTINUED | OUTPATIENT
Start: 2024-05-07 | End: 2024-05-09 | Stop reason: HOSPADM

## 2024-05-07 RX ORDER — ONDANSETRON 2 MG/ML
4 INJECTION INTRAMUSCULAR; INTRAVENOUS
Status: DISCONTINUED | OUTPATIENT
Start: 2024-05-07 | End: 2024-05-07 | Stop reason: HOSPADM

## 2024-05-07 RX ORDER — OXYCODONE HYDROCHLORIDE 5 MG/1
5 TABLET ORAL PRN
Status: DISCONTINUED | OUTPATIENT
Start: 2024-05-07 | End: 2024-05-07 | Stop reason: HOSPADM

## 2024-05-07 RX ORDER — DROPERIDOL 2.5 MG/ML
0.62 INJECTION, SOLUTION INTRAMUSCULAR; INTRAVENOUS
Status: DISCONTINUED | OUTPATIENT
Start: 2024-05-07 | End: 2024-05-07 | Stop reason: HOSPADM

## 2024-05-07 RX ORDER — SODIUM CHLORIDE 0.9 % (FLUSH) 0.9 %
5-40 SYRINGE (ML) INJECTION PRN
Status: DISCONTINUED | OUTPATIENT
Start: 2024-05-07 | End: 2024-05-07 | Stop reason: HOSPADM

## 2024-05-07 RX ORDER — OXYCODONE HYDROCHLORIDE 5 MG/1
10 TABLET ORAL PRN
Status: DISCONTINUED | OUTPATIENT
Start: 2024-05-07 | End: 2024-05-07 | Stop reason: HOSPADM

## 2024-05-07 RX ORDER — PROPOFOL 10 MG/ML
INJECTION, EMULSION INTRAVENOUS PRN
Status: DISCONTINUED | OUTPATIENT
Start: 2024-05-07 | End: 2024-05-07 | Stop reason: SDUPTHER

## 2024-05-07 RX ORDER — SODIUM CHLORIDE, SODIUM LACTATE, POTASSIUM CHLORIDE, CALCIUM CHLORIDE 600; 310; 30; 20 MG/100ML; MG/100ML; MG/100ML; MG/100ML
INJECTION, SOLUTION INTRAVENOUS CONTINUOUS
Status: DISCONTINUED | OUTPATIENT
Start: 2024-05-07 | End: 2024-05-07 | Stop reason: HOSPADM

## 2024-05-07 RX ORDER — DIPHENHYDRAMINE HYDROCHLORIDE 50 MG/ML
12.5 INJECTION INTRAMUSCULAR; INTRAVENOUS
Status: DISCONTINUED | OUTPATIENT
Start: 2024-05-07 | End: 2024-05-07 | Stop reason: HOSPADM

## 2024-05-07 RX ORDER — HYDRALAZINE HYDROCHLORIDE 20 MG/ML
10 INJECTION INTRAMUSCULAR; INTRAVENOUS
Status: DISCONTINUED | OUTPATIENT
Start: 2024-05-07 | End: 2024-05-09 | Stop reason: HOSPADM

## 2024-05-07 RX ORDER — HYDROMORPHONE HYDROCHLORIDE 1 MG/ML
0.5 INJECTION, SOLUTION INTRAMUSCULAR; INTRAVENOUS; SUBCUTANEOUS EVERY 5 MIN PRN
Status: DISCONTINUED | OUTPATIENT
Start: 2024-05-07 | End: 2024-05-07 | Stop reason: HOSPADM

## 2024-05-07 RX ADMIN — SODIUM CHLORIDE, PRESERVATIVE FREE 10 ML: 5 INJECTION INTRAVENOUS at 09:34

## 2024-05-07 RX ADMIN — INSULIN LISPRO 3 UNITS: 100 INJECTION, SOLUTION INTRAVENOUS; SUBCUTANEOUS at 05:42

## 2024-05-07 RX ADMIN — PROPOFOL 20 MG: 10 INJECTION, EMULSION INTRAVENOUS at 17:12

## 2024-05-07 RX ADMIN — PROPOFOL 40 MG: 10 INJECTION, EMULSION INTRAVENOUS at 17:01

## 2024-05-07 RX ADMIN — WATER 2000 MG: 1 INJECTION INTRAMUSCULAR; INTRAVENOUS; SUBCUTANEOUS at 16:57

## 2024-05-07 RX ADMIN — ENOXAPARIN SODIUM 30 MG: 100 INJECTION SUBCUTANEOUS at 01:38

## 2024-05-07 RX ADMIN — PROPOFOL 30 MG: 10 INJECTION, EMULSION INTRAVENOUS at 17:18

## 2024-05-07 RX ADMIN — PROPOFOL 40 MG: 10 INJECTION, EMULSION INTRAVENOUS at 16:58

## 2024-05-07 RX ADMIN — Medication 4 UNITS: at 11:42

## 2024-05-07 RX ADMIN — INSULIN LISPRO 3 UNITS: 100 INJECTION, SOLUTION INTRAVENOUS; SUBCUTANEOUS at 11:41

## 2024-05-07 RX ADMIN — PROPOFOL 40 MG: 10 INJECTION, EMULSION INTRAVENOUS at 17:17

## 2024-05-07 RX ADMIN — PROPOFOL 20 MG: 10 INJECTION, EMULSION INTRAVENOUS at 17:10

## 2024-05-07 RX ADMIN — PROPOFOL 40 MG: 10 INJECTION, EMULSION INTRAVENOUS at 16:59

## 2024-05-07 RX ADMIN — PROPOFOL 30 MG: 10 INJECTION, EMULSION INTRAVENOUS at 17:26

## 2024-05-07 RX ADMIN — METOPROLOL TARTRATE 2.5 MG: 5 INJECTION INTRAVENOUS at 01:01

## 2024-05-07 RX ADMIN — PROPOFOL 20 MG: 10 INJECTION, EMULSION INTRAVENOUS at 17:04

## 2024-05-07 RX ADMIN — INSULIN LISPRO 3 UNITS: 100 INJECTION, SOLUTION INTRAVENOUS; SUBCUTANEOUS at 19:53

## 2024-05-07 RX ADMIN — PROPOFOL 20 MG: 10 INJECTION, EMULSION INTRAVENOUS at 17:16

## 2024-05-07 RX ADMIN — PROPOFOL 30 MG: 10 INJECTION, EMULSION INTRAVENOUS at 17:28

## 2024-05-07 RX ADMIN — PROPOFOL 20 MG: 10 INJECTION, EMULSION INTRAVENOUS at 17:15

## 2024-05-07 RX ADMIN — PROPOFOL 30 MG: 10 INJECTION, EMULSION INTRAVENOUS at 17:22

## 2024-05-07 RX ADMIN — PROPOFOL 20 MG: 10 INJECTION, EMULSION INTRAVENOUS at 17:11

## 2024-05-07 RX ADMIN — INSULIN GLARGINE 12 UNITS: 100 INJECTION, SOLUTION SUBCUTANEOUS at 09:35

## 2024-05-07 RX ADMIN — PROPOFOL 40 MG: 10 INJECTION, EMULSION INTRAVENOUS at 17:00

## 2024-05-07 RX ADMIN — PROPOFOL 40 MG: 10 INJECTION, EMULSION INTRAVENOUS at 16:53

## 2024-05-07 RX ADMIN — PROPOFOL 20 MG: 10 INJECTION, EMULSION INTRAVENOUS at 17:13

## 2024-05-07 RX ADMIN — PROPOFOL 30 MG: 10 INJECTION, EMULSION INTRAVENOUS at 17:21

## 2024-05-07 RX ADMIN — DEXTROSE AND SODIUM CHLORIDE: 5; 450 INJECTION, SOLUTION INTRAVENOUS at 19:18

## 2024-05-07 RX ADMIN — LEVETIRACETAM 500 MG: 100 INJECTION, SOLUTION INTRAVENOUS at 04:04

## 2024-05-07 RX ADMIN — PROPOFOL 20 MG: 10 INJECTION, EMULSION INTRAVENOUS at 17:14

## 2024-05-07 RX ADMIN — Medication 4 UNITS: at 05:10

## 2024-05-07 RX ADMIN — PROPOFOL 50 MG: 10 INJECTION, EMULSION INTRAVENOUS at 17:35

## 2024-05-07 RX ADMIN — PROPOFOL 30 MG: 10 INJECTION, EMULSION INTRAVENOUS at 17:20

## 2024-05-07 RX ADMIN — METOPROLOL TARTRATE 2.5 MG: 5 INJECTION INTRAVENOUS at 12:06

## 2024-05-07 RX ADMIN — PROPOFOL 20 MG: 10 INJECTION, EMULSION INTRAVENOUS at 17:05

## 2024-05-07 RX ADMIN — Medication 4 UNITS: at 19:53

## 2024-05-07 RX ADMIN — PROPOFOL 20 MG: 10 INJECTION, EMULSION INTRAVENOUS at 17:06

## 2024-05-07 RX ADMIN — LEVETIRACETAM 500 MG: 100 INJECTION, SOLUTION INTRAVENOUS at 19:13

## 2024-05-07 RX ADMIN — PROPOFOL 20 MG: 10 INJECTION, EMULSION INTRAVENOUS at 17:08

## 2024-05-07 RX ADMIN — DEXTROSE AND SODIUM CHLORIDE: 5; 450 INJECTION, SOLUTION INTRAVENOUS at 10:23

## 2024-05-07 RX ADMIN — SODIUM CHLORIDE: 900 INJECTION, SOLUTION INTRAVENOUS at 16:40

## 2024-05-07 RX ADMIN — FOLIC ACID: 5 INJECTION, SOLUTION INTRAMUSCULAR; INTRAVENOUS; SUBCUTANEOUS at 12:11

## 2024-05-07 RX ADMIN — PROPOFOL 30 MG: 10 INJECTION, EMULSION INTRAVENOUS at 17:03

## 2024-05-07 RX ADMIN — PROPOFOL 30 MG: 10 INJECTION, EMULSION INTRAVENOUS at 17:24

## 2024-05-07 RX ADMIN — SODIUM CHLORIDE, PRESERVATIVE FREE 40 MG: 5 INJECTION INTRAVENOUS at 09:25

## 2024-05-07 RX ADMIN — PROPOFOL 20 MG: 10 INJECTION, EMULSION INTRAVENOUS at 17:07

## 2024-05-07 RX ADMIN — METOPROLOL TARTRATE 2.5 MG: 5 INJECTION INTRAVENOUS at 08:30

## 2024-05-07 RX ADMIN — ENOXAPARIN SODIUM 30 MG: 100 INJECTION SUBCUTANEOUS at 13:11

## 2024-05-07 RX ADMIN — METOPROLOL TARTRATE 2.5 MG: 5 INJECTION INTRAVENOUS at 05:01

## 2024-05-07 RX ADMIN — PROPOFOL 50 MG: 10 INJECTION, EMULSION INTRAVENOUS at 17:36

## 2024-05-07 RX ADMIN — PROPOFOL 20 MG: 10 INJECTION, EMULSION INTRAVENOUS at 17:30

## 2024-05-07 RX ADMIN — PROPOFOL 20 MG: 10 INJECTION, EMULSION INTRAVENOUS at 17:09

## 2024-05-07 RX ADMIN — PROPOFOL 40 MG: 10 INJECTION, EMULSION INTRAVENOUS at 17:02

## 2024-05-07 RX ADMIN — METOPROLOL TARTRATE 2.5 MG: 5 INJECTION INTRAVENOUS at 19:26

## 2024-05-07 ASSESSMENT — PAIN - FUNCTIONAL ASSESSMENT
PAIN_FUNCTIONAL_ASSESSMENT: ADULT NONVERBAL PAIN SCALE (NPVS)

## 2024-05-07 ASSESSMENT — PAIN SCALES - GENERAL: PAINLEVEL_OUTOF10: 0

## 2024-05-07 ASSESSMENT — PAIN SCALES - WONG BAKER: WONGBAKER_NUMERICALRESPONSE: NO HURT

## 2024-05-07 NOTE — PROCEDURES
ELECTROENCEPHALOGRAPHY     Patient: Louis Sarkar MRN: [id number]  CSN: [id number]    YOB: 1985  Age: 38 y.o.  Sex: male    DOA: 4/18/2024 LOS:  LOS: 1 day        Date of Service: 4/18/24    DICTATING: Jaspreet Quiñonez MD     REFERRING PHYSICIAN: Dr. Terrazas    ELECTROENCEPHALOGRAM NUMBER: [id number]    HISTORY OF PRESENT ILLNESS: This is a 38-year-old gentleman with history of diabetes, alcohol use, hypertension and hyperlipidemia, who presented with unresponsiveness in the setting of profound hypoglycemia. The patient is comatose, intubated and sedated.     ELECTROENCEPHALOGRAM INTERPRETATION: This is a referential and bipolar EEG recorded with a 10-20 system. EEG background shows generalized low amplitude 2 to 3 Hz delta activities intermixed with 4 to 6 Hz theta activities. There are intermittent periods of epileptiform discharges, more on the right side (PLEDs). There are periods where the background activity is suppressed briefly. There is no regular background of sleep pattern or wakefulness. Photic stimulation does not produce an abnormal activity. There are no seizures during this study.    IMPRESSION: This EEG is abnormal due to mild to moderate generalized slowing and periodic lateralized epileptiform discharges (PLEDs), more on the right side, which indicate diffuse cerebral dysfunction from any cause including -but not limited to- toxic, metabolic and infectious etiologies. This EEG cannot be used to determine anoxic brain injury. Clinical correlation is recommended.    Signed:  Jaspreet Quiñonez MD  4/19/2024  11:52 AM  
No acute findings in the abdomen or pelvis.  : JONI ADAME MD    Referring Provider:  Ty Mendez MD    Sedation:  MAC sedation Propofol 900 mg    Procedure Details:  After infomed consent was obtained for the procedure, with all risks and benefits of procedure explained to the family the patient was taken to the endoscopy suite and placed in the left lateral decubitus position.  Following sequential administration of sedation as per above, the endoscope was inserted into the mouth and advanced under direct vision to third portion of the duodenum.  A careful inspection was made as the gastroscope was withdrawn, including a retroflexed view of the proximal stomach; findings and interventions are described below.      OROPHARYNX: The vocal Cords and the larynx could not seen. pyriform recesses normal.   ESOPHAGUS: The proximal, mid, and distal oesophagus are normal. The Z-Line is intact. Tiny reducible Hiatal hernia. < 8 mm. The diaphragmatic opening located at 46 cm  STOMACH:  The stomach is large containing small amount of liquid in the stomach. There are 2 slightly congested folds in the prepyloric area. The fundus on antegrade and retroflex views is normal. The cardia, body, lesser curvature, greater curvature and pylorus are normal. The site of insertion is chosen after finding an adequate location by transillumination and local pressure on the abdominal wall.  The gastric mucosa is normal. A 20 Fr pull type PEG tube from the company [employer] is placed on the anterior wall of the distal gastric body at 57 cm using sterile technique, in the usual fashion, after injecting subcutaneous Xylocaine 1% without epinephrine 5 cc. Repeat endoscopy is done to check the position of the PEG tube from inside the stomach which was adequate.  DUODENUM: The bulb, second, third, fourth portions an d major papilla are normal.  Therapies:  20 Fr [employer] PEG tube placement according to the

## 2024-05-07 NOTE — PERIOP NOTE
Report called to Claritza Friedman RN on [address], patient now has PEG tube present and wrist restraints on for safety of tube, Propofol 900 given during the procedure. VSS, O2 sats 93% on RA, patient mentality back at base line,

## 2024-05-07 NOTE — ANESTHESIA POSTPROCEDURE EVALUATION
Post-Anesthesia Evaluation & Assessment    Vitals  BP: (!) 146/90  Temp: 98.5 °F (36.9 °C)  Temp Source: Axillary  Pulse: 77  Respirations: 18  SpO2: 100 %  Height: 190.5 cm (6' 3\")  Weight - Scale: 125.5 kg (276 lb 10.8 oz)  Pain Level: 0    Nausea/Vomiting: Controlled.    Post-operative hydration adequate.    Pain managed.    Mental status & Level of consciousness: baseline    Neurological status: unchanged    Pulmonary status: airway patent, adequate oxygenation.    Complications related to anesthesia: none    Patient has met all PACU discharge requirements.      Heriberto Taylor, DO

## 2024-05-07 NOTE — PERIOP NOTE
Patient transported back to his room via bed by endoscopy staff, updated his nurse at the bedside and visually inspected the PEG tube and site,

## 2024-05-08 LAB
ANION GAP SERPL CALC-SCNC: 12 MMOL/L (ref 3–18)
BUN SERPL-MCNC: 12 MG/DL (ref 7–18)
BUN/CREAT SERPL: 16 (ref 12–20)
CALCIUM SERPL-MCNC: 9.7 MG/DL (ref 8.5–10.1)
CHLORIDE SERPL-SCNC: 106 MMOL/L (ref 100–111)
CO2 SERPL-SCNC: 22 MMOL/L (ref 21–32)
CREAT SERPL-MCNC: 0.75 MG/DL (ref 0.6–1.3)
GLUCOSE BLD STRIP.AUTO-MCNC: 187 MG/DL (ref 70–110)
GLUCOSE BLD STRIP.AUTO-MCNC: 219 MG/DL (ref 70–110)
GLUCOSE BLD STRIP.AUTO-MCNC: 237 MG/DL (ref 70–110)
GLUCOSE BLD STRIP.AUTO-MCNC: 249 MG/DL (ref 70–110)
GLUCOSE BLD STRIP.AUTO-MCNC: 277 MG/DL (ref 70–110)
GLUCOSE SERPL-MCNC: 263 MG/DL (ref 74–99)
MAGNESIUM SERPL-MCNC: 1.4 MG/DL (ref 1.6–2.6)
PHOSPHATE SERPL-MCNC: 4 MG/DL (ref 2.5–4.9)
POTASSIUM SERPL-SCNC: 4 MMOL/L (ref 3.5–5.5)
SODIUM SERPL-SCNC: 140 MMOL/L (ref 136–145)

## 2024-05-08 PROCEDURE — 2580000003 HC RX 258: Performed by: INTERNAL MEDICINE

## 2024-05-08 PROCEDURE — 97110 THERAPEUTIC EXERCISES: CPT

## 2024-05-08 PROCEDURE — 2580000003 HC RX 258: Performed by: HOSPITALIST

## 2024-05-08 PROCEDURE — 97164 PT RE-EVAL EST PLAN CARE: CPT

## 2024-05-08 PROCEDURE — 2580000003 HC RX 258: Performed by: ANESTHESIOLOGY

## 2024-05-08 PROCEDURE — 82962 GLUCOSE BLOOD TEST: CPT

## 2024-05-08 PROCEDURE — 2500000003 HC RX 250 WO HCPCS: Performed by: INTERNAL MEDICINE

## 2024-05-08 PROCEDURE — 6370000000 HC RX 637 (ALT 250 FOR IP): Performed by: FAMILY MEDICINE

## 2024-05-08 PROCEDURE — 84100 ASSAY OF PHOSPHORUS: CPT

## 2024-05-08 PROCEDURE — 97112 NEUROMUSCULAR REEDUCATION: CPT

## 2024-05-08 PROCEDURE — 6370000000 HC RX 637 (ALT 250 FOR IP): Performed by: INTERNAL MEDICINE

## 2024-05-08 PROCEDURE — 2500000003 HC RX 250 WO HCPCS: Performed by: HOSPITALIST

## 2024-05-08 PROCEDURE — 1100000000 HC RM PRIVATE

## 2024-05-08 PROCEDURE — 36415 COLL VENOUS BLD VENIPUNCTURE: CPT

## 2024-05-08 PROCEDURE — 83735 ASSAY OF MAGNESIUM: CPT

## 2024-05-08 PROCEDURE — 6360000002 HC RX W HCPCS: Performed by: INTERNAL MEDICINE

## 2024-05-08 PROCEDURE — 6360000002 HC RX W HCPCS: Performed by: HOSPITALIST

## 2024-05-08 PROCEDURE — 80048 BASIC METABOLIC PNL TOTAL CA: CPT

## 2024-05-08 PROCEDURE — A4216 STERILE WATER/SALINE, 10 ML: HCPCS | Performed by: HOSPITALIST

## 2024-05-08 PROCEDURE — C9113 INJ PANTOPRAZOLE SODIUM, VIA: HCPCS | Performed by: HOSPITALIST

## 2024-05-08 PROCEDURE — 6370000000 HC RX 637 (ALT 250 FOR IP): Performed by: HOSPITALIST

## 2024-05-08 RX ORDER — MAGNESIUM SULFATE HEPTAHYDRATE 40 MG/ML
2000 INJECTION, SOLUTION INTRAVENOUS
Status: DISPENSED | OUTPATIENT
Start: 2024-05-08 | End: 2024-05-08

## 2024-05-08 RX ADMIN — INSULIN LISPRO 3 UNITS: 100 INJECTION, SOLUTION INTRAVENOUS; SUBCUTANEOUS at 06:20

## 2024-05-08 RX ADMIN — MAGNESIUM SULFATE HEPTAHYDRATE 2000 MG: 40 INJECTION, SOLUTION INTRAVENOUS at 18:19

## 2024-05-08 RX ADMIN — INSULIN GLARGINE 12 UNITS: 100 INJECTION, SOLUTION SUBCUTANEOUS at 09:02

## 2024-05-08 RX ADMIN — METOPROLOL TARTRATE 2.5 MG: 5 INJECTION INTRAVENOUS at 00:38

## 2024-05-08 RX ADMIN — SODIUM CHLORIDE, PRESERVATIVE FREE 10 ML: 5 INJECTION INTRAVENOUS at 09:03

## 2024-05-08 RX ADMIN — SODIUM CHLORIDE, PRESERVATIVE FREE 10 ML: 5 INJECTION INTRAVENOUS at 09:00

## 2024-05-08 RX ADMIN — METOPROLOL TARTRATE 2.5 MG: 5 INJECTION INTRAVENOUS at 09:01

## 2024-05-08 RX ADMIN — ENOXAPARIN SODIUM 30 MG: 100 INJECTION SUBCUTANEOUS at 00:35

## 2024-05-08 RX ADMIN — MAGNESIUM SULFATE HEPTAHYDRATE 2000 MG: 40 INJECTION, SOLUTION INTRAVENOUS at 13:28

## 2024-05-08 RX ADMIN — MAGNESIUM SULFATE HEPTAHYDRATE 2000 MG: 40 INJECTION, SOLUTION INTRAVENOUS at 20:40

## 2024-05-08 RX ADMIN — ENOXAPARIN SODIUM 30 MG: 100 INJECTION SUBCUTANEOUS at 13:28

## 2024-05-08 RX ADMIN — METOPROLOL TARTRATE 2.5 MG: 5 INJECTION INTRAVENOUS at 17:41

## 2024-05-08 RX ADMIN — Medication 2 UNITS: at 17:42

## 2024-05-08 RX ADMIN — METOPROLOL TARTRATE 2.5 MG: 5 INJECTION INTRAVENOUS at 21:37

## 2024-05-08 RX ADMIN — WATER 2000 MG: 1 INJECTION INTRAMUSCULAR; INTRAVENOUS; SUBCUTANEOUS at 00:36

## 2024-05-08 RX ADMIN — INSULIN LISPRO 3 UNITS: 100 INJECTION, SOLUTION INTRAVENOUS; SUBCUTANEOUS at 23:26

## 2024-05-08 RX ADMIN — SODIUM CHLORIDE, PRESERVATIVE FREE 10 ML: 5 INJECTION INTRAVENOUS at 21:37

## 2024-05-08 RX ADMIN — INSULIN LISPRO 3 UNITS: 100 INJECTION, SOLUTION INTRAVENOUS; SUBCUTANEOUS at 17:42

## 2024-05-08 RX ADMIN — FOLIC ACID: 5 INJECTION, SOLUTION INTRAMUSCULAR; INTRAVENOUS; SUBCUTANEOUS at 10:55

## 2024-05-08 RX ADMIN — METOPROLOL TARTRATE 2.5 MG: 5 INJECTION INTRAVENOUS at 11:44

## 2024-05-08 RX ADMIN — INSULIN LISPRO 3 UNITS: 100 INJECTION, SOLUTION INTRAVENOUS; SUBCUTANEOUS at 00:34

## 2024-05-08 RX ADMIN — INSULIN LISPRO 3 UNITS: 100 INJECTION, SOLUTION INTRAVENOUS; SUBCUTANEOUS at 11:44

## 2024-05-08 RX ADMIN — LEVETIRACETAM 500 MG: 100 INJECTION, SOLUTION INTRAVENOUS at 06:20

## 2024-05-08 RX ADMIN — SODIUM CHLORIDE, PRESERVATIVE FREE 10 ML: 5 INJECTION INTRAVENOUS at 09:02

## 2024-05-08 RX ADMIN — LEVETIRACETAM 500 MG: 100 INJECTION, SOLUTION INTRAVENOUS at 15:39

## 2024-05-08 RX ADMIN — METOPROLOL TARTRATE 2.5 MG: 5 INJECTION INTRAVENOUS at 06:29

## 2024-05-08 RX ADMIN — Medication 2 UNITS: at 11:44

## 2024-05-08 RX ADMIN — SODIUM CHLORIDE, PRESERVATIVE FREE 40 MG: 5 INJECTION INTRAVENOUS at 09:01

## 2024-05-08 RX ADMIN — SODIUM CHLORIDE, PRESERVATIVE FREE 10 ML: 5 INJECTION INTRAVENOUS at 21:38

## 2024-05-08 RX ADMIN — Medication 2 UNITS: at 23:27

## 2024-05-08 RX ADMIN — MAGNESIUM SULFATE HEPTAHYDRATE 2000 MG: 40 INJECTION, SOLUTION INTRAVENOUS at 16:10

## 2024-05-08 ASSESSMENT — PAIN SCALES - GENERAL
PAINLEVEL_OUTOF10: 0
PAINLEVEL_OUTOF10: 0

## 2024-05-08 ASSESSMENT — PAIN SCALES - WONG BAKER: WONGBAKER_NUMERICALRESPONSE: NO HURT

## 2024-05-08 NOTE — CONSULTS
[facility]  (A Division of [facility])                                                           Date of Admission: 4/18/2024       Reason for Consult:   Referring MD: Dr Candice Smith    C/C: AMS/unresponsiveness    Current Antimicrobials:    Prior Antimicrobials:    Zosyn 4/18 to date   NA   Allergy to antibiotics: NA       Assessment--ID related:     Critically ill patient with:    BL pneumonia, dense infiltrate on CT- probable aspiration    Bacteremia with GPC- may or may not be real    Acute encephalopathy -- Multifactorial-- hypoglycemia/etoh/drugs +/- infection. Brain edema on head MRI    Acute hypercarbic respiratory failure- intubated on admission      Microlab data:    4/18-Viral test /PCR for COVID 19/Influenza A and B -negative.          -Urine antigen for legionealla and pneumococcus is negative          -Blood culture -NGSF         -UA clean      Co-morbidities:  Seizure episode-new, DM, neuropathy, Asthma, MDD, cocaine use. ETOH use    Recommendation -- ID related:     Cont Zosyn for aspiration pneumonia. Vancomycin dosing per pharmacy. Will continue until GPC ID is final    Sputum culture, MRSA screen ordered  FU blood culture- ID until final. Hold off repeat blood culture until ID known  Daily labs per ICU routine     DW nursing/ Guido Brush and Mk.    Thank you for involving me in the care of this patient. Please do not hesitate to contact me on the above number if question or concern.          HPI:      Louis Sarkar is a 38 y.o. male with PMH as above. Found unresponsive and admitted via ED on 4/18-- with hypoglcyemia of 31, urine screen positive for etoh/cocaine and THC. Brain MRI with edema-- intubated and admitted to ICU. Emperic Zosyn/Vanco in place with dense pneumonia on CT.    Pt not able to give history-intubated.    History obtained from wife( provided limited info), med stafff, chart reviewed in Epic and relevant 
  NEUROLOGY CONSULTATION NOTE    Patient: Louis Sarkar MRN: [id number]  CSN: [id number]    YOB: 1985  Age: 38 y.o.  Sex: male    DOA: 4/18/2024 LOS:  LOS: 0 days        Requesting Physician: Dr. Scott  Reason for Consultation:coma               HISTORY OF PRESENT ILLNESS:   38 y.o. male with history of diabetes, alcohol use, hypertension, hyperlipidemia, depression presented to ER due to unresponsiveness.  Patient was last witnessed normal by his wife at midnight.  After his wife went back home around 10: 30,  is unresponsive with foaming in the mouth.    Per ER and wife reported, wife came home from work this morning, she found the patient unresponsiveness with \"foam staff\"in her mouth.  Wife reported that Dexcom needs to be replaced recently.  At emergency room, he was found profound hypoglycemia with BG of 31.CT head indicated  possible cerebral edema.  Urine drug screen indicated cocaine and marijuana positive.   Patient does not have history of seizure.  When he was coughing, there is muscle jerking movements.  PAST MEDICAL HISTORY:  Past Medical History:   Diagnosis Date    Alcohol use disorder, severe, dependence ([facility]) 3/29/2018    Alcohol withdrawal, uncomplicated ([facility]) 3/29/2018    Asthma     vs Reactive Airway Disease with normal PFT 7/7/14    Diabetes ([facility]) 2012    HTN (hypertension)     Hyperlipidemia LDL goal < 70 12/2014    Leg pain, bilateral 2012    ? peripheral neuropathy    MDD (major depressive disorder), recurrent severe, without psychosis ([facility]) 3/29/2018    Vitamin D deficiency 12/2014     PAST SURGICAL HISTORY:  No past surgical history on file.  FAMILY HISTORY:  Family History   Problem Relation Age of Onset    Diabetes Other         cousin, Type I dm    Diabetes Maternal Aunt     Diabetes Paternal Aunt     Stroke Paternal Uncle     Diabetes Mother     Hypertension Mother      SOCIAL HISTORY:  Social History     Socioeconomic History    Marital status: 
INTERVENTIONAL RADIOLOGY  Consult Note    Patient:  Louis Sarkar  :  1985  Age:  38 y.o.  MRN:  [id number]    Today's Date:  2024  Admission Date:  2024  Hospital Day:  13  Consult requested by:  Simin Malone MD      CC / HPI   Louis Sarkar is a 38 y.o. male with a history of polysubstance abuse. Found unresponsive. Anoxic encephalopathy, acute respiratory failure extubated , malnutrition.    IR consulted for evaluation for percutaneous gastrostomy tube placement.    Patient seen at bedside, sitter present. Patient not cooperative, nonverbal. No family present.    PAST MEDICAL HISTORY  Past Medical History:   Diagnosis Date    Alcohol use disorder, severe, dependence ([facility]) 3/29/2018    Alcohol withdrawal, uncomplicated ([facility]) 3/29/2018    Asthma     vs Reactive Airway Disease with normal PFT 14    Diabetes ([facility])     HTN (hypertension)     Hyperlipidemia LDL goal < 70 2014    Leg pain, bilateral     ? peripheral neuropathy    MDD (major depressive disorder), recurrent severe, without psychosis ([facility]) 3/29/2018    Vitamin D deficiency 2014       PAST SURGICAL HISTORY  Past Surgical History:   Procedure Laterality Date    BRONCHOSCOPY N/A 2024    BRONCHOSCOPY; LAVAGE performed by Angel James MD at [facility] ENDOSCOPY       CURRENT MEDICATIONS  Current Facility-Administered Medications   Medication Dose Route Frequency Provider Last Rate Last Admin    cloNIDine (CATAPRES) 0.1 MG/24HR 1 patch  1 patch TransDERmal Weekly Angel James MD   1 patch at 24 1338    ipratropium 0.5 mg-albuterol 2.5 mg (DUONEB) nebulizer solution 1 Dose  1 Dose Inhalation Q4H PRN Angel James MD        ampicillin-sulbactam (UNASYN) 3,000 mg in sodium chloride 0.9 % 100 mL IVPB (mini-bag)  3,000 mg IntraVENous Q6H Mariela Andrew MD   Stopped at 24 1110    0.9 % sodium chloride infusion   IntraVENous Continuous Angel James MD 50 
IP WOUND CONSULT    Louis Sarkar  MEDICAL RECORD NUMBER:  [id number]  AGE: 38 y.o.   GENDER: male  : 1985  TODAY'S DATE:  2024    GENERAL     [] Follow-up   [] New Consult    [] Present on Admission  [] Hospital Acquired    Louis Sarkar is a 38 y.o. male referred by:   [] Physician  [] Nursing  [] Other:         PAST MEDICAL HISTORY    Past Medical History:   Diagnosis Date    Alcohol use disorder, severe, dependence ([facility]) 3/29/2018    Alcohol withdrawal, uncomplicated ([facility]) 3/29/2018    Asthma     vs Reactive Airway Disease with normal PFT 14    Diabetes ([facility])     HTN (hypertension)     Hyperlipidemia LDL goal < 70 2014    Leg pain, bilateral     ? peripheral neuropathy    MDD (major depressive disorder), recurrent severe, without psychosis ([facility]) 3/29/2018    Vitamin D deficiency 2014        PAST SURGICAL HISTORY    No past surgical history on file.    FAMILY HISTORY    Family History   Problem Relation Age of Onset    Diabetes Other         cousin, Type I dm    Diabetes Maternal Aunt     Diabetes Paternal Aunt     Stroke Paternal Uncle     Diabetes Mother     Hypertension Mother        SOCIAL HISTORY    Social History     Tobacco Use    Smoking status: Former    Smokeless tobacco: Never   Substance Use Topics    Alcohol use: Not Currently    Drug use: No       ALLERGIES    No Known Allergies    MEDICATIONS    No current facility-administered medications on file prior to encounter.     Current Outpatient Medications on File Prior to Encounter   Medication Sig Dispense Refill    albuterol sulfate HFA (PROVENTIL;VENTOLIN;PROAIR) 108 (90 Base) MCG/ACT inhaler Inhale 2 puffs into the lungs every 4 hours as needed      amitriptyline (ELAVIL) 25 MG tablet Take 1 tablet by mouth nightly      ARIPiprazole (ABILIFY) 5 MG tablet Take 1.5 tablets by mouth daily      atorvastatin (LIPITOR) 40 MG tablet Take 1 tablet by mouth nightly      empagliflozin (JARDIANCE) 10 MG 
IP WOUND CONSULT    Louis Sarkar  MEDICAL RECORD NUMBER:  [id number]  AGE: 38 y.o.   GENDER: male  : 1985  TODAY'S DATE:  2024    GENERAL     [] Follow-up   [x] New Consult    [x] Present on Admission  [] Hospital Acquired    Louis Sarkar is a 38 y.o. male referred by:   [] Physician  [x] Nursing  [] Other:         PAST MEDICAL HISTORY    Past Medical History:   Diagnosis Date    Alcohol use disorder, severe, dependence ([facility]) 3/29/2018    Alcohol withdrawal, uncomplicated ([facility]) 3/29/2018    Asthma     vs Reactive Airway Disease with normal PFT 14    Diabetes ([facility])     HTN (hypertension)     Hyperlipidemia LDL goal < 70 2014    Leg pain, bilateral     ? peripheral neuropathy    MDD (major depressive disorder), recurrent severe, without psychosis ([facility]) 3/29/2018    Vitamin D deficiency 2014        PAST SURGICAL HISTORY    Past Surgical History:   Procedure Laterality Date    BRONCHOSCOPY N/A 2024    BRONCHOSCOPY; LAVAGE performed by Angel James MD at [facility] ENDOSCOPY       FAMILY HISTORY    Family History   Problem Relation Age of Onset    Diabetes Other         cousin, Type I dm    Diabetes Maternal Aunt     Diabetes Paternal Aunt     Stroke Paternal Uncle     Diabetes Mother     Hypertension Mother        SOCIAL HISTORY    Social History     Tobacco Use    Smoking status: Former    Smokeless tobacco: Never   Substance Use Topics    Alcohol use: Not Currently    Drug use: No       ALLERGIES    No Known Allergies    MEDICATIONS    No current facility-administered medications on file prior to encounter.     Current Outpatient Medications on File Prior to Encounter   Medication Sig Dispense Refill    albuterol sulfate HFA (PROVENTIL;VENTOLIN;PROAIR) 108 (90 Base) MCG/ACT inhaler Inhale 2 puffs into the lungs every 4 hours as needed      amitriptyline (ELAVIL) 25 MG tablet Take 1 tablet by mouth nightly      ARIPiprazole (ABILIFY) 5 MG tablet Take 
Palliative Medicine  Patient Name: Louis Sarkar  YOB: 1985  MRN: [id number]  Age: 38 y.o.  Gender: male    Date of Initial Consult: 4/18/2024  Date of Service: 4/18/2024  Time: 2:59 PM  Provider: VELIA Eckert NP  Hospital Day: 1  Admit Date: 4/18/2024  Referring Provider: Simin Malone MD        Reasons for Consultation:  Goals of Care    HISTORY OF PRESENT ILLNESS (HPI):   Louis Sarkar is a 38 y.o. male with a past medical history of type 2 DM, HTN, and ETOH use, and polysubstance use, who was admitted on 4/18/2024 from home with a diagnosis of unresponsiveness, hypoglycemia, acute respiratory failure, aspiration pneumonia, urine drug screen positive for cocaine, marijuana, and ethanol level 90 mg/dL.  Wife reports that patient was last seen well at midnight on 4/18 and was found this morning at 10:28 AM unresponsive, foaming at the mouth, with an empty insulin pen beside him.  Wife does report cocaine and alcohol use, and recent stress due to their 14-year-old child running away 2 months ago and has not yet been found.  Wife unsure if patient was considering self harm.  Per chart review, blood sugar was 31 when EMS checked it.  Patient is currently in the intensive care unit, orally intubated, nonresponsive.  CT of the head-Suggestion overall of loss of the gray-white junction and diffuse sulcal effacement which is concerning for cerebral edema, please correlate clinically. No acute hemorrhage is identified.  CT abdomen and pelvis shows 1. Extensive bilateral lung  infiltrates consistent with multifocal pneumonia versus aspiration pneumonitis. 2. The ET tube tip is at the josefa. It can be retracted 2-3 cm for improved positioning. 3. No acute findings in the abdomen or pelvis.        PALLIATIVE DIAGNOSES:    Goals of care discussion/advance care planning  Acute respiratory failure  Unresponsiveness  Hypoglycemic with underlying diabetes mellitus  Aspiration 
to sedate.    Further note will follow.        SHELLI ZEE MD MBE/MIRA  D:  05/08/2024 17:56:28  T:  05/08/2024 18:55:49  JOB #:  [id number]    CC:    __________.  
pneumonitis. 2. The ET tube tip is at the josefa. It can be retracted 2-3 cm for improved positioning. 3. No acute findings in the abdomen or pelvis.    CT HEAD WO CONTRAST    Result Date: 4/18/2024  Suggestion overall of loss of the gray-white junction and diffuse sulcal effacement which is concerning for cerebral edema, please correlate clinically. No acute hemorrhage is identified.     XR CHEST PORTABLE    Result Date: 4/18/2024  Diffuse bilateral pulmonary infiltrates. ET tube and NG tube satisfactory position.           ·Please note: Voice-recognition software may have been used to generate this report, which may have resulted in some phonetic-based errors in grammar and contents. Even though attempts were made to correct all the mistakes, some may have been missed, and remained in the body of the document.    Caitlyn Terrazas MD  4/18/2024

## 2024-05-09 VITALS
RESPIRATION RATE: 15 BRPM | BODY MASS INDEX: 32.1 KG/M2 | TEMPERATURE: 98.7 F | WEIGHT: 258.16 LBS | SYSTOLIC BLOOD PRESSURE: 130 MMHG | HEART RATE: 83 BPM | OXYGEN SATURATION: 97 % | HEIGHT: 75 IN | DIASTOLIC BLOOD PRESSURE: 73 MMHG

## 2024-05-09 LAB
ANION GAP SERPL CALC-SCNC: 8 MMOL/L (ref 3–18)
BUN SERPL-MCNC: 14 MG/DL (ref 7–18)
BUN/CREAT SERPL: 18 (ref 12–20)
CALCIUM SERPL-MCNC: 9.4 MG/DL (ref 8.5–10.1)
CHLORIDE SERPL-SCNC: 104 MMOL/L (ref 100–111)
CO2 SERPL-SCNC: 25 MMOL/L (ref 21–32)
CREAT SERPL-MCNC: 0.8 MG/DL (ref 0.6–1.3)
GLUCOSE BLD STRIP.AUTO-MCNC: 273 MG/DL (ref 70–110)
GLUCOSE BLD STRIP.AUTO-MCNC: 301 MG/DL (ref 70–110)
GLUCOSE BLD STRIP.AUTO-MCNC: 303 MG/DL (ref 70–110)
GLUCOSE SERPL-MCNC: 311 MG/DL (ref 74–99)
MAGNESIUM SERPL-MCNC: 2 MG/DL (ref 1.6–2.6)
PHOSPHATE SERPL-MCNC: 4.4 MG/DL (ref 2.5–4.9)
POTASSIUM SERPL-SCNC: 4 MMOL/L (ref 3.5–5.5)
SODIUM SERPL-SCNC: 137 MMOL/L (ref 136–145)

## 2024-05-09 PROCEDURE — 83735 ASSAY OF MAGNESIUM: CPT

## 2024-05-09 PROCEDURE — 84100 ASSAY OF PHOSPHORUS: CPT

## 2024-05-09 PROCEDURE — C9113 INJ PANTOPRAZOLE SODIUM, VIA: HCPCS | Performed by: HOSPITALIST

## 2024-05-09 PROCEDURE — A4216 STERILE WATER/SALINE, 10 ML: HCPCS | Performed by: HOSPITALIST

## 2024-05-09 PROCEDURE — 80048 BASIC METABOLIC PNL TOTAL CA: CPT

## 2024-05-09 PROCEDURE — 6370000000 HC RX 637 (ALT 250 FOR IP): Performed by: HOSPITALIST

## 2024-05-09 PROCEDURE — 2500000003 HC RX 250 WO HCPCS: Performed by: INTERNAL MEDICINE

## 2024-05-09 PROCEDURE — 36415 COLL VENOUS BLD VENIPUNCTURE: CPT

## 2024-05-09 PROCEDURE — 82962 GLUCOSE BLOOD TEST: CPT

## 2024-05-09 PROCEDURE — 6360000002 HC RX W HCPCS: Performed by: INTERNAL MEDICINE

## 2024-05-09 PROCEDURE — 6360000002 HC RX W HCPCS: Performed by: HOSPITALIST

## 2024-05-09 PROCEDURE — 2580000003 HC RX 258: Performed by: HOSPITALIST

## 2024-05-09 PROCEDURE — 2580000003 HC RX 258: Performed by: INTERNAL MEDICINE

## 2024-05-09 PROCEDURE — 6370000000 HC RX 637 (ALT 250 FOR IP): Performed by: FAMILY MEDICINE

## 2024-05-09 PROCEDURE — 6370000000 HC RX 637 (ALT 250 FOR IP): Performed by: INTERNAL MEDICINE

## 2024-05-09 PROCEDURE — 2580000003 HC RX 258: Performed by: ANESTHESIOLOGY

## 2024-05-09 PROCEDURE — 97110 THERAPEUTIC EXERCISES: CPT

## 2024-05-09 RX ORDER — INSULIN LISPRO 100 [IU]/ML
3 INJECTION, SOLUTION INTRAVENOUS; SUBCUTANEOUS EVERY 6 HOURS
Qty: 10 ML | Refills: 0
Start: 2024-05-09

## 2024-05-09 RX ORDER — PANTOPRAZOLE SODIUM 40 MG/1
40 TABLET, DELAYED RELEASE ORAL
Qty: 30 TABLET | Refills: 0
Start: 2024-05-10

## 2024-05-09 RX ORDER — PANTOPRAZOLE SODIUM 40 MG/1
40 TABLET, DELAYED RELEASE ORAL
Status: DISCONTINUED | OUTPATIENT
Start: 2024-05-10 | End: 2024-05-09 | Stop reason: HOSPADM

## 2024-05-09 RX ORDER — CLONIDINE 0.1 MG/24H
1 PATCH, EXTENDED RELEASE TRANSDERMAL WEEKLY
Qty: 4 PATCH | Refills: 0
Start: 2024-05-11

## 2024-05-09 RX ORDER — GAUZE BANDAGE 2" X 2"
100 BANDAGE TOPICAL DAILY
Status: DISCONTINUED | OUTPATIENT
Start: 2024-05-09 | End: 2024-05-09 | Stop reason: HOSPADM

## 2024-05-09 RX ORDER — LEVETIRACETAM 100 MG/ML
500 SOLUTION ORAL 2 TIMES DAILY
Status: DISCONTINUED | OUTPATIENT
Start: 2024-05-09 | End: 2024-05-09 | Stop reason: HOSPADM

## 2024-05-09 RX ORDER — THIAMINE MONONITRATE (VIT B1) 100 MG
TABLET ORAL
Qty: 30 TABLET | Refills: 0
Start: 2024-05-09

## 2024-05-09 RX ORDER — IPRATROPIUM BROMIDE AND ALBUTEROL SULFATE 2.5; .5 MG/3ML; MG/3ML
3 SOLUTION RESPIRATORY (INHALATION) EVERY 4 HOURS PRN
Qty: 360 ML | Refills: 0
Start: 2024-05-09

## 2024-05-09 RX ORDER — LEVETIRACETAM 100 MG/ML
500 SOLUTION ORAL 2 TIMES DAILY
Qty: 473 ML | Refills: 0
Start: 2024-05-09

## 2024-05-09 RX ORDER — INSULIN GLARGINE 100 [IU]/ML
12 INJECTION, SOLUTION SUBCUTANEOUS DAILY
Qty: 10 ML | Refills: 3
Start: 2024-05-10

## 2024-05-09 RX ADMIN — INSULIN GLARGINE 12 UNITS: 100 INJECTION, SOLUTION SUBCUTANEOUS at 10:16

## 2024-05-09 RX ADMIN — LEVETIRACETAM 500 MG: 100 INJECTION, SOLUTION INTRAVENOUS at 04:15

## 2024-05-09 RX ADMIN — Medication 6 UNITS: at 11:50

## 2024-05-09 RX ADMIN — METOPROLOL TARTRATE 2.5 MG: 5 INJECTION INTRAVENOUS at 10:17

## 2024-05-09 RX ADMIN — METOPROLOL TARTRATE 2.5 MG: 5 INJECTION INTRAVENOUS at 01:36

## 2024-05-09 RX ADMIN — SODIUM CHLORIDE, PRESERVATIVE FREE 10 ML: 5 INJECTION INTRAVENOUS at 10:18

## 2024-05-09 RX ADMIN — SODIUM CHLORIDE, PRESERVATIVE FREE 40 MG: 5 INJECTION INTRAVENOUS at 10:17

## 2024-05-09 RX ADMIN — INSULIN LISPRO 3 UNITS: 100 INJECTION, SOLUTION INTRAVENOUS; SUBCUTANEOUS at 11:51

## 2024-05-09 RX ADMIN — THIAMINE HCL TAB 100 MG 100 MG: 100 TAB at 12:53

## 2024-05-09 RX ADMIN — METOPROLOL TARTRATE 25 MG: 25 TABLET, FILM COATED ORAL at 12:40

## 2024-05-09 RX ADMIN — INSULIN LISPRO 3 UNITS: 100 INJECTION, SOLUTION INTRAVENOUS; SUBCUTANEOUS at 05:33

## 2024-05-09 RX ADMIN — FOLIC ACID: 5 INJECTION, SOLUTION INTRAMUSCULAR; INTRAVENOUS; SUBCUTANEOUS at 10:43

## 2024-05-09 RX ADMIN — Medication 4 UNITS: at 05:34

## 2024-05-09 RX ADMIN — SODIUM CHLORIDE, PRESERVATIVE FREE 10 ML: 5 INJECTION INTRAVENOUS at 10:19

## 2024-05-09 RX ADMIN — METOPROLOL TARTRATE 2.5 MG: 5 INJECTION INTRAVENOUS at 05:33

## 2024-05-09 RX ADMIN — ENOXAPARIN SODIUM 30 MG: 100 INJECTION SUBCUTANEOUS at 13:54

## 2024-05-09 RX ADMIN — ENOXAPARIN SODIUM 30 MG: 100 INJECTION SUBCUTANEOUS at 01:35

## 2024-05-09 ASSESSMENT — PAIN SCALES - GENERAL: PAINLEVEL_OUTOF10: 0

## 2024-05-09 ASSESSMENT — PAIN SCALES - WONG BAKER: WONGBAKER_NUMERICALRESPONSE: NO HURT

## 2024-05-09 NOTE — PLAN OF CARE
Problem: Discharge Planning  Goal: Discharge to home or other facility with appropriate resources  5/4/2024 1854 by Marietta Cain RN  Outcome: Progressing  5/4/2024 1853 by Marietta Cain RN  Outcome: Not Progressing  5/4/2024 1852 by Marietta Cain RN  Outcome: Progressing     Problem: Safety - Adult  Goal: Free from fall injury  5/4/2024 1854 by Marietta Cain RN  Outcome: Progressing  5/4/2024 1852 by Marietta Cain RN  Outcome: Progressing     Problem: Safety - Medical Restraint  Goal: Remains free of injury from restraints (Restraint for Interference with Medical Device)  Description: INTERVENTIONS:  1. Determine that other, less restrictive measures have been tried or would not be effective before applying the restraint  2. Evaluate the patient's condition at the time of restraint application  3. Inform patient/family regarding the reason for restraint  4. Q2H: Monitor safety, psychosocial status, comfort, nutrition and hydration  5/4/2024 1854 by Marietta Cain RN  Outcome: Progressing  5/4/2024 1852 by Marietta Cain RN  Outcome: Progressing     Problem: Discharge Planning  Goal: Discharge to home or other facility with appropriate resources  5/4/2024 1854 by Marietta Cain RN  Outcome: Progressing  5/4/2024 1853 by Marietta Cain RN  Outcome: Not Progressing  5/4/2024 1852 by Marietta aCin RN  Outcome: Progressing     
  Problem: Discharge Planning  Goal: Discharge to home or other facility with appropriate resources  5/5/2024 2157 by Venita Shelton RN  Outcome: Progressing  5/5/2024 1254 by Bess Raphael RN  Outcome: Progressing  Flowsheets (Taken 5/5/2024 0252 by Tonja Patterson, RN)  Discharge to home or other facility with appropriate resources:   Arrange for needed discharge resources and transportation as appropriate   Identify barriers to discharge with patient and caregiver   Identify discharge learning needs (meds, wound care, etc)     Problem: Safety - Adult  Goal: Free from fall injury  5/5/2024 2157 by Venita Shelton RN  Outcome: Progressing  5/5/2024 1254 by Bess Raphael RN  Outcome: Progressing     Problem: Pain  Goal: Verbalizes/displays adequate comfort level or baseline comfort level  Outcome: Progressing     Problem: Chronic Conditions and Co-morbidities  Goal: Patient's chronic conditions and co-morbidity symptoms are monitored and maintained or improved  5/5/2024 2157 by Venita Shelton RN  Outcome: Progressing  5/5/2024 1254 by Bess Raphael RN  Outcome: Progressing  Flowsheets (Taken 5/5/2024 0252 by Tonja Patterson, RN)  Care Plan - Patient's Chronic Conditions and Co-Morbidity Symptoms are Monitored and Maintained or Improved: Monitor and assess patient's chronic conditions and comorbid symptoms for stability, deterioration, or improvement     Problem: Skin/Tissue Integrity  Goal: Absence of new skin breakdown  Description: 1.  Monitor for areas of redness and/or skin breakdown  2.  Assess vascular access sites hourly  3.  Every 4-6 hours minimum:  Change oxygen saturation probe site  4.  Every 4-6 hours:  If on nasal continuous positive airway pressure, respiratory therapy assess nares and determine need for appliance change or resting period.  5/5/2024 2157 by Venita Shelton RN  Outcome: Progressing  5/5/2024 1254 by Bess Raphael RN  Outcome: Progressing     Problem: 
  Problem: Discharge Planning  Goal: Discharge to home or other facility with appropriate resources  Outcome: Progressing     Problem: Safety - Adult  Goal: Free from fall injury  5/7/2024 0022 by Janis Peoples, RN  Outcome: Progressing  5/6/2024 1828 by Lida Gibson, RN  Outcome: Progressing     Problem: Chronic Conditions and Co-morbidities  Goal: Patient's chronic conditions and co-morbidity symptoms are monitored and maintained or improved  Outcome: Progressing     Problem: Skin/Tissue Integrity  Goal: Absence of new skin breakdown  Description: 1.  Monitor for areas of redness and/or skin breakdown  2.  Assess vascular access sites hourly  3.  Every 4-6 hours minimum:  Change oxygen saturation probe site  4.  Every 4-6 hours:  If on nasal continuous positive airway pressure, respiratory therapy assess nares and determine need for appliance change or resting period.  Outcome: Progressing     Problem: Nutrition Deficit:  Goal: Optimize nutritional status  Outcome: Progressing     
  Problem: Discharge Planning  Goal: Discharge to home or other facility with appropriate resources  Outcome: Progressing     Problem: Safety - Adult  Goal: Free from fall injury  Outcome: Progressing     Problem: Pain  Goal: Verbalizes/displays adequate comfort level or baseline comfort level  Outcome: Progressing     Problem: Chronic Conditions and Co-morbidities  Goal: Patient's chronic conditions and co-morbidity symptoms are monitored and maintained or improved  Outcome: Progressing     Problem: Skin/Tissue Integrity  Goal: Absence of new skin breakdown  Description: 1.  Monitor for areas of redness and/or skin breakdown  2.  Assess vascular access sites hourly  3.  Every 4-6 hours minimum:  Change oxygen saturation probe site  4.  Every 4-6 hours:  If on nasal continuous positive airway pressure, respiratory therapy assess nares and determine need for appliance change or resting period.  Outcome: Progressing     Problem: Nutrition Deficit:  Goal: Optimize nutritional status  Outcome: Progressing     Problem: Safety - Medical Restraint  Goal: Remains free of injury from restraints (Restraint for Interference with Medical Device)  Description: INTERVENTIONS:  1. Determine that other, less restrictive measures have been tried or would not be effective before applying the restraint  2. Evaluate the patient's condition at the time of restraint application  3. Inform patient/family regarding the reason for restraint  4. Q2H: Monitor safety, psychosocial status, comfort, nutrition and hydration  Outcome: Progressing     
  Problem: Discharge Planning  Goal: Discharge to home or other facility with appropriate resources  Outcome: Progressing     Problem: Safety - Adult  Goal: Free from fall injury  Outcome: Progressing     Problem: Safety - Medical Restraint  Goal: Remains free of injury from restraints (Restraint for Interference with Medical Device)  Description: INTERVENTIONS:  1. Determine that other, less restrictive measures have been tried or would not be effective before applying the restraint  2. Evaluate the patient's condition at the time of restraint application  3. Inform patient/family regarding the reason for restraint  4. Q2H: Monitor safety, psychosocial status, comfort, nutrition and hydration  Outcome: Progressing     
  Problem: Discharge Planning  Goal: Discharge to home or other facility with appropriate resources  Outcome: Progressing  Flowsheets (Taken 4/24/2024 0800)  Discharge to home or other facility with appropriate resources:   Identify barriers to discharge with patient and caregiver   Arrange for needed discharge resources and transportation as appropriate   Identify discharge learning needs (meds, wound care, etc)   Refer to discharge planning if patient needs post-hospital services based on physician order or complex needs related to functional status, cognitive ability or social support system     Problem: Safety - Medical Restraint  Goal: Remains free of injury from restraints (Restraint for Interference with Medical Device)  Description: INTERVENTIONS:  1. Determine that other, less restrictive measures have been tried or would not be effective before applying the restraint  2. Evaluate the patient's condition at the time of restraint application  3. Inform patient/family regarding the reason for restraint  4. Q2H: Monitor safety, psychosocial status, comfort, nutrition and hydration  Outcome: Progressing     Problem: Safety - Adult  Goal: Free from fall injury  Outcome: Progressing     Problem: Pain  Goal: Verbalizes/displays adequate comfort level or baseline comfort level  Outcome: Progressing  Flowsheets (Taken 4/24/2024 0800)  Verbalizes/displays adequate comfort level or baseline comfort level:   Encourage patient to monitor pain and request assistance   Assess pain using appropriate pain scale   Administer analgesics based on type and severity of pain and evaluate response   Implement non-pharmacological measures as appropriate and evaluate response   Consider cultural and social influences on pain and pain management   Notify Licensed Independent Practitioner if interventions unsuccessful or patient reports new pain     Problem: Chronic Conditions and Co-morbidities  Goal: Patient's chronic conditions and 
  Problem: Discharge Planning  Goal: Discharge to home or other facility with appropriate resources  Outcome: Progressing  Flowsheets (Taken 5/5/2024 0252 by Tonja Patterson, RN)  Discharge to home or other facility with appropriate resources:   Arrange for needed discharge resources and transportation as appropriate   Identify barriers to discharge with patient and caregiver   Identify discharge learning needs (meds, wound care, etc)     Problem: Safety - Adult  Goal: Free from fall injury  Outcome: Progressing     Problem: Chronic Conditions and Co-morbidities  Goal: Patient's chronic conditions and co-morbidity symptoms are monitored and maintained or improved  Outcome: Progressing  Flowsheets (Taken 5/5/2024 0252 by Tonja Patterson, RN)  Care Plan - Patient's Chronic Conditions and Co-Morbidity Symptoms are Monitored and Maintained or Improved: Monitor and assess patient's chronic conditions and comorbid symptoms for stability, deterioration, or improvement     Problem: Skin/Tissue Integrity  Goal: Absence of new skin breakdown  Description: 1.  Monitor for areas of redness and/or skin breakdown  2.  Assess vascular access sites hourly  3.  Every 4-6 hours minimum:  Change oxygen saturation probe site  4.  Every 4-6 hours:  If on nasal continuous positive airway pressure, respiratory therapy assess nares and determine need for appliance change or resting period.  Outcome: Progressing     Problem: Nutrition Deficit:  Goal: Optimize nutritional status  Outcome: Progressing     Problem: Safety - Medical Restraint  Goal: Remains free of injury from restraints (Restraint for Interference with Medical Device)  Description: INTERVENTIONS:  1. Determine that other, less restrictive measures have been tried or would not be effective before applying the restraint  2. Evaluate the patient's condition at the time of restraint application  3. Inform patient/family regarding the reason for restraint  4. Q2H: Monitor safety, 
  Problem: Safety - Adult  Goal: Free from fall injury  5/4/2024 0250 by Loan Trujillo, RN  Outcome: Progressing  5/3/2024 1807 by Lida Gibson, RN  Outcome: Progressing     Problem: Skin/Tissue Integrity  Goal: Absence of new skin breakdown  Description: 1.  Monitor for areas of redness and/or skin breakdown  2.  Assess vascular access sites hourly  3.  Every 4-6 hours minimum:  Change oxygen saturation probe site  4.  Every 4-6 hours:  If on nasal continuous positive airway pressure, respiratory therapy assess nares and determine need for appliance change or resting period.  Outcome: Progressing     
  Problem: Safety - Adult  Goal: Free from fall injury  Outcome: Progressing     Problem: Chronic Conditions and Co-morbidities  Goal: Patient's chronic conditions and co-morbidity symptoms are monitored and maintained or improved  Outcome: Progressing     Problem: Skin/Tissue Integrity  Goal: Absence of new skin breakdown  Description: 1.  Monitor for areas of redness and/or skin breakdown  2.  Assess vascular access sites hourly  3.  Every 4-6 hours minimum:  Change oxygen saturation probe site  4.  Every 4-6 hours:  If on nasal continuous positive airway pressure, respiratory therapy assess nares and determine need for appliance change or resting period.  Outcome: Progressing     Problem: Nutrition Deficit:  Goal: Optimize nutritional status  Outcome: Progressing     
  Problem: Safety - Adult  Goal: Free from fall injury  Outcome: Progressing     Problem: Pain  Goal: Verbalizes/displays adequate comfort level or baseline comfort level  Outcome: Progressing     Problem: Chronic Conditions and Co-morbidities  Goal: Patient's chronic conditions and co-morbidity symptoms are monitored and maintained or improved  Outcome: Progressing     Problem: Safety - Medical Restraint  Goal: Remains free of injury from restraints (Restraint for Interference with Medical Device)  Description: INTERVENTIONS:  1. Determine that other, less restrictive measures have been tried or would not be effective before applying the restraint  2. Evaluate the patient's condition at the time of restraint application  3. Inform patient/family regarding the reason for restraint  4. Q2H: Monitor safety, psychosocial status, comfort, nutrition and hydration  Outcome: Progressing     
  Problem: Skin/Tissue Integrity  Goal: Absence of new skin breakdown  Description: 1.  Monitor for areas of redness and/or skin breakdown  2.  Assess vascular access sites hourly  3.  Every 4-6 hours minimum:  Change oxygen saturation probe site  4.  Every 4-6 hours:  If on nasal continuous positive airway pressure, respiratory therapy assess nares and determine need for appliance change or resting period.  5/3/2024 0257 by Loan Trujillo, RN  Outcome: Progressing  5/2/2024 1708 by Lima Prado, RN  Outcome: Progressing       Problem: Safety - Adult  Goal: Free from fall injury  5/3/2024 0257 by Loan Trujillo, RN  Outcome: Progressing  5/2/2024 1708 by Lima Prado, RN  Outcome: Progressing        
Comprehensive Nutrition Assessment    Type and Reason for Visit:  Initial, Consult    Nutrition Recommendations/Plan:   Initiate Glucerna 1.5 @ 10ml/hr and advance by 10ml/hr, as tolerated. Goal rate of 50ml/hr, plus 150ml water flushes every 4 hours, providing 1800kcal, 99g protein and 1811ml water.  Monitor tolerance of EN support  Monitor labs, weight and POC while admitted     Malnutrition Assessment:  Malnutrition Status:  Insufficient data (complete NFPE in-person) (04/20/24 1059)    Context:  Acute Illness     Findings of the 6 clinical characteristics of malnutrition:  Energy Intake:  Mild decrease in energy intake (Comment) (NPO since admission; unknown nutr hx prior to admission)  Weight Loss:  Unable to assess     Body Fat Loss:  Unable to assess     Muscle Mass Loss:  Unable to assess    Fluid Accumulation:  No significant fluid accumulation     Strength:  Not Performed    Nutrition Assessment:    Pt admitted for the managementt of unresponsiveness. Pt brought to ED via EMS. Per documentation, despite correction of hypoglycemia and administration of  naloxone without improvement, pt was intubated to protect airways. Imaging revealed multifocal pneumonia and receiving antibiotics. Consult for enteral nutrition recommendations placed. Recommend initiation of Glucerna 1.5 @ 10ml/hr, increase by 10ml/hr as tolerated, with goal rate of 50ml/hr x 24 hrs, providing 1800kcal and 99g of protein. Include 150ml water flushes every 4 hours.    Nutrition Related Findings:    Last BM: PTA. Currently intubated and NPO. No edema. Labs: K+ 3.4, albumin 2.4 and glucose 218 Wound Type: None       Current Nutrition Intake & Therapies:    Average Meal Intake: NPO  Average Supplements Intake: NPO  Diet NPO  ADULT TUBE FEEDING; Nasogastric; Diabetic; Continuous; 10; Yes; 10; Q 6 hours; 50; 150; Q 4 hours    Anthropometric Measures:  Height: 182.9 cm (6')  Ideal Body Weight (IBW): 178 lbs (81 kg)    Admission Body Weight: 
Nutrition Assessment     Type and Reason for Visit: Reassess    Nutrition Recommendations/Plan:   Continue TF as ordered  Replete Mag  Monitor other lytes and replace PRN  Monitor labs, weights, EN tolerance and plan of care while admitted     Malnutrition Assessment:  Malnutrition Status: At risk for malnutrition (Comment) (wt fluctations mass or inaccurate weights? new PEG placement)    Nutrition Assessment:  PEG tube successfully placed since 5/6/24, RD assessment. PEG feeding rate adjusted accordingly as ordered. Glucerna 1.5 @ 15 ml/hr, advance 10 ml q 6 hr, goal @ 65 ml/hr. Water Flushes: 190 mL q 4 hr (1140 mL total). Goal TF & Flush Orders Provides: 2340 kcal, 129 gm pro, 1184 mL free water from tube feeding only, 2324 mL total water. Suspect weight 5/6/24 of 288lb not most accurate as lots of blankets visible on bed and bed scale used v cbw of 258lb.    Estimated Daily Nutrient Needs:  Energy (kcal):  2400 (based on cbw MSJ 1.1 AF, 1 SF) Weight Used for Energy Requirements: Current     Protein (g):   (1.2-1.5g.kg) Weight Used for Protein Requirements: Ideal        Fluid (ml/day):  or per MD Method Used for Fluid Requirements: 1 ml/kcal    Nutrition Related Findings:   Labs and meds reviewed. Mag 1.4 and glucose > 300 Wound Type: Multiple, Partial Thickness    Current Nutrition Therapies:    Diet NPO  ADULT TUBE FEEDING; PEG; Diabetic; Continuous; 15; Yes; 10; Q 6 hours; 65; 190; Q 4 hours    Anthropometric Measures:  Height: 190.5 cm (6' 3\")  Current Body Wt: 117 kg (258 lb)   BMI: 32.2    Nutrition Diagnosis:   In context of acute illness or injury related to altered GI function as evidenced by other (comment) (need for PEG placement)    Nutrition Interventions:   Food and/or Nutrient Delivery: Continue Current Tube Feeding  Nutrition Education/Counseling: No recommendation at this time  Coordination of Nutrition Care: Continue to monitor while inpatient, Coordination of Care       Goals:  Previous 
co-morbidity symptoms are monitored and maintained or improved  Outcome: Progressing  Flowsheets (Taken 4/23/2024 1532)  Care Plan - Patient's Chronic Conditions and Co-Morbidity Symptoms are Monitored and Maintained or Improved:   Monitor and assess patient's chronic conditions and comorbid symptoms for stability, deterioration, or improvement   Collaborate with multidisciplinary team to address chronic and comorbid conditions and prevent exacerbation or deterioration   Update acute care plan with appropriate goals if chronic or comorbid symptoms are exacerbated and prevent overall improvement and discharge     Problem: Skin/Tissue Integrity  Goal: Absence of new skin breakdown  Description: 1.  Monitor for areas of redness and/or skin breakdown  2.  Assess vascular access sites hourly  3.  Every 4-6 hours minimum:  Change oxygen saturation probe site  4.  Every 4-6 hours:  If on nasal continuous positive airway pressure, respiratory therapy assess nares and determine need for appliance change or resting period.  Outcome: Progressing     Problem: Nutrition Deficit:  Goal: Optimize nutritional status  Outcome: Progressing

## 2024-05-09 NOTE — PROGRESS NOTES
Pulmonary Specialists  Pulmonary, Critical Care, and Sleep Medicine    Name: Louis Sarkar MRN: [id number]   : 1985 Hospital: [facility]    Date: 2024  Room: [address] [place] [address] Note                                              Consult requesting physician: Dr. Malone  Reason for Consult: Respiratory failure acute change in mental status, unresponsiveness, brain edema, seizures.  Drug overdose.hypogycemia    IMPRESSION:   Acute hypoxemic and hypercarbic respiratory failure                    J96.01 J96.02  Brain edema                                                                                  G93.6  Unresponsives                                                                              R41.89    Seizures                                                                                        R 56.9  Overdose  Hypoglycemia  Polysubstance abuse          Active Hospital Problems    Diagnosis Date Noted    Alcohol use disorder, moderate, dependence (HCC) [F10.20] 2018     Priority: Medium    Smoker [F17.200] 2024    Unresponsiveness [R41.89] 2024    Acute respiratory failure with hypoxia and hypercarbia (HCC) [J96.01, J96.02] 2024    Cocaine abuse (HCC) [F14.10] 2024    Hypoglycemia [E16.2] 2024    Brain edema (HCC) [G93.6] 2024    Seizures (HCC) [R56.9] 2024    MDD (major depressive disorder), recurrent severe, without psychosis (HCC) [F33.2] 2018    HTN (hypertension) [I10] 2014    Asthma [J45.909] 04/10/2013        Code status: Full Code       RECOMMENDATIONS:     Respiratory:   Acute hypercarbic hypoxemic respiratory failure  Found unresponsive with hypoglycemia and positive tox screen  Related to change in mental status subsequently aspiration event.  On full ventilatory support.  ABG improving 7.46/39/79/96 ON  /18/35/PEEP5  Bronchodilators ( smoker brovana,Pulmicort,Atrovent and acc for 
                     Pulmonary Specialists  Pulmonary, Critical Care, and Sleep Medicine    Name: Louis Sarkar MRN: [id number]   : 1985 Hospital: [facility]    Date: 2024  Room: [address] [place] [address] Note                                              Consult requesting physician: Dr. Malone  Reason for Consult: Respiratory failure acute change in mental status, unresponsiveness, brain edema, seizures.  Drug overdose.hypogycemia    IMPRESSION:   Acute hypoxemic and hypercarbic respiratory failure                     Anoxic encephalopathy    Delirium                                                                          Cocaine abuse                                                                      Seizures                                                                                         Hypoglycemia  Polysubstance abuse      Active Hospital Problems    Diagnosis Date Noted    Alcohol use disorder, moderate, dependence (HCC) [F10.20] 2018     Priority: Medium    Delirium [R41.0] 2024    Anoxic encephalopathy (HCC) [G93.1] 2024    Smoker [F17.200] 2024    Unresponsiveness [R41.89] 2024    Acute respiratory failure with hypoxia and hypercarbia (HCC) [J96.01, J96.02] 2024    Cocaine abuse (HCC) [F14.10] 2024    Hypoglycemia [E16.2] 2024    Seizures (HCC) [R56.9] 2024    MDD (major depressive disorder), recurrent severe, without psychosis (HCC) [F33.2] 2018    HTN (hypertension) [I10] 2014    Asthma [J45.909] 04/10/2013          Code status: Full Code       RECOMMENDATIONS:     Patient presented with altered mentation, hypoglycemia, respiratory failure with aspiration pneumonia.  Patient was intubated 2024.    Respiratory: Patient extubated    Overall stable respirations and improved oxygenation; patient currently on room air  Chest x-ray  reviewed images and report-mild cardiomegaly, obese chest wall, 
                     Pulmonary Specialists  Pulmonary, Critical Care, and Sleep Medicine    Name: Louis Sarkar MRN: [id number]   : 1985 Hospital: [facility]    Date: 2024  Room: [address] [place] [address] Note                                              Consult requesting physician: Dr. Malone  Reason for Consult: Respiratory failure acute change in mental status, unresponsiveness, brain edema, seizures.  Drug overdose.hypogycemia    IMPRESSION:   Acute hypoxemic and hypercarbic respiratory failure                     Anoxic encephalopathy    Delirium                                                                          Cocaine abuse                                                                      Seizures                                                                                         Hypoglycemia  Polysubstance abuse      Active Hospital Problems    Diagnosis Date Noted    Alcohol use disorder, moderate, dependence (HCC) [F10.20] 2018     Priority: Medium    Delirium [R41.0] 2024    Anoxic encephalopathy (HCC) [G93.1] 2024    Smoker [F17.200] 2024    Unresponsiveness [R41.89] 2024    Acute respiratory failure with hypoxia and hypercarbia (HCC) [J96.01, J96.02] 2024    Cocaine abuse (HCC) [F14.10] 2024    Hypoglycemia [E16.2] 2024    Seizures (HCC) [R56.9] 2024    MDD (major depressive disorder), recurrent severe, without psychosis (HCC) [F33.2] 2018    HTN (hypertension) [I10] 2014    Asthma [J45.909] 04/10/2013          Code status: Full Code       RECOMMENDATIONS:     Patient presented with altered mentation, hypoglycemia, respiratory failure with aspiration pneumonia.  Patient was intubated 2024.    Respiratory: Patient extubated    Overall stable respirations and improved oxygenation; patient currently on room air  Chest x-ray -mild cardiomegaly, obese chest wall, hypoventilation, no 
                     Pulmonary Specialists  Pulmonary, Critical Care, and Sleep Medicine    Name: Louis Sarkar MRN: [id number]   : 1985 Hospital: [facility]    Date: 2024  Room: [address] [place] [address] Note                                              Consult requesting physician: Dr. Malone  Reason for Consult: Respiratory failure acute change in mental status, unresponsiveness, brain edema, seizures.  Drug overdose.hypogycemia    IMPRESSION:   Acute hypoxemic and hypercarbic respiratory failure                     Anoxic encephalopathy    Delirium                                                                          Cocaine abuse                                                                      Seizures                                                                                         Hypoglycemia  Polysubstance abuse      Active Hospital Problems    Diagnosis Date Noted    Alcohol use disorder, moderate, dependence (HCC) [F10.20] 2018     Priority: Medium    Delirium [R41.0] 2024    Anoxic encephalopathy (HCC) [G93.1] 2024    Smoker [F17.200] 2024    Unresponsiveness [R41.89] 2024    Acute respiratory failure with hypoxia and hypercarbia (HCC) [J96.01, J96.02] 2024    Cocaine abuse (HCC) [F14.10] 2024    Hypoglycemia [E16.2] 2024    Seizures (HCC) [R56.9] 2024    MDD (major depressive disorder), recurrent severe, without psychosis (HCC) [F33.2] 2018    HTN (hypertension) [I10] 2014    Asthma [J45.909] 04/10/2013          Code status: Full Code       RECOMMENDATIONS:     Patient presented with altered mentation, hypoglycemia, respiratory failure with aspiration pneumonia.  Anoxic encephalopathy.  Patient was intubated 2024; extubated on 2024.    Respiratory: Patient extubated   Respiratory status appears stable.  On room air.  Strict aspiration precautions.    Chest x-ray  reviewed images and 
                     Pulmonary Specialists  Pulmonary, Critical Care, and Sleep Medicine    Name: Louis Sarkar MRN: [id number]   : 1985 Hospital: [facility]    Date: 2024  Room: [address] [place] [address] Note                                              Consult requesting physician: Dr. Malone  Reason for Consult: Respiratory failure acute change in mental status, unresponsiveness, brain edema, seizures.  Drug overdose.hypogycemia    IMPRESSION:   Acute hypoxemic and hypercarbic respiratory failure                    J96.01 J96.02  Brain edema                                                                                  G93.6  Unresponsives                                                                              R41.89    Seizures                                                                                        R 56.9  Overdose  Hypoglycemia  Polysubstance abuse          Active Hospital Problems    Diagnosis Date Noted    Alcohol use disorder, moderate, dependence (HCC) [F10.20] 2018     Priority: Medium    Smoker [F17.200] 2024    Unresponsiveness [R41.89] 2024    Acute respiratory failure with hypoxia and hypercarbia (HCC) [J96.01, J96.02] 2024    Cocaine abuse (HCC) [F14.10] 2024    Hypoglycemia [E16.2] 2024    Brain edema (HCC) [G93.6] 2024    Seizures (HCC) [R56.9] 2024    MDD (major depressive disorder), recurrent severe, without psychosis (HCC) [F33.2] 2018    HTN (hypertension) [I10] 2014    Asthma [J45.909] 04/10/2013        Code status: Full Code       RECOMMENDATIONS:     Respiratory:   Acute hypercarbic hypoxemic respiratory failure  Found unresponsive with hypoglycemia and positive tox screen  Related to change in mental status subsequently aspiration event.  On full ventilatory support.  ABG improving 7.47/37/83/97 /18/35/PEEP5  Bronchodilators ( smoker brovana,Pulmicort,Atrovent and acc for thick 
       [facility]  (A Division of [facility])                                                           Date of Admission: 4/18/2024       Reason for Consult:   Referring MD: Dr Candice Smith    C/C: AMS/unresponsiveness    Current Antimicrobials:    Prior Antimicrobials:      Unasyn 4/27 to date  #10     Vanco 4/18 to 20  Zosyn 4/18 to 4/26  Doxy 4/21 to 4/25   Allergy to antibiotics: NA       Assessment--ID related:       BL pneumonia, dense infiltrate on CT- probable aspiration  Procal high-30, declining  FU CXR with pul edema, lung collapse etc  --S/P Bronch 4/24/24--MSSA along with routine resp telma    LGF with progression on CXR-- T max 101-- etiology?Improved    CoNS bacteremia  likely procurement contamination     Acute encephalopathy -- Multifactorial-- hypoglycemia/etoh/drugs +/- infection. Brain edema on head MRI. Cognitive status impaired-- ongoing evaluation    Acute hypercarbic respiratory failure- improved, extubated 4/25      Microlab data:    4/18-Viral test /PCR for COVID 19/Influenza A and B -negative.          -Urine antigen for legionealla and pneumococcus is negative          -Blood culture GPC-- staph spp 1/4 bottle-- non S.aureus.epidermis or lugenesis by PCR         -UA clean  4/19 - resp culture: few normal telma  4/20--blood culture: NGSF  4/24- Bronch -- cultures with MSSA moderate growth, resp telma  4/25- HIV non reactive    Co-morbidities:  Seizure episode-new, DM, neuropathy, Asthma, MDD, cocaine use. ETOH use    Recommendation -- ID related:     Resumed Unasyn IV-- will give extended rx for MSSA Pneumonia-- to May 3. Can use Augmentin when able to take PO  Supportive care and additional treatment per respective team     Discussed with ( in bold) 1. Patient 2. MD, Dr James- Nursing 3. Microbiology/ Labratory 4. Radiology  5./case management 6. Others         Today's Visit:     Patient was extubated 4/25.  On RA. Afebrile. 
       [facility]  (A Division of [facility])                                                           Date of Admission: 4/18/2024       Reason for Consult:   Referring MD: Dr Candice Smith    C/C: AMS/unresponsiveness    Current Antimicrobials:    Prior Antimicrobials:    Zosyn 4/18 to date #8  Doxy 4/21 to date     Vanco 4/18 to 20   Allergy to antibiotics: NA       Assessment--ID related:     Critically ill patient with:    BL pneumonia, dense infiltrate on CT- probable aspiration  Procal high-30, declining  FU CXR with pul edema, lung collapse etc  --S/P Bronch 4/24/24--    LGF with progression on CXR-- T max 101-- etiology? Seem to decline after doxycycline added    CoNS bacteremia  likely procurement contamination     Acute encephalopathy -- Multifactorial-- hypoglycemia/etoh/drugs +/- infection. Brain edema on head MRI    Acute hypercarbic respiratory failure- extubated 4/25      Microlab data:    4/18-Viral test /PCR for COVID 19/Influenza A and B -negative.          -Urine antigen for legionealla and pneumococcus is negative          -Blood culture GPC-- staph spp 1/4 bottle-- non S.aureus.epidermis or lugenesis by PCR         -UA clean  4/19 - resp culture: few normal telma  4/20--blood culture: NGSF  4/24- Bronch -- cultures in progress  4/25- HIV non reactive    Co-morbidities:  Seizure episode-new, DM, neuropathy, Asthma, MDD, cocaine use. ETOH use    Recommendation -- ID related:     Cont with Zosyn/doxy--complete today and DC  Supportive care and additional treatment per respective team     Discussed with ( in bold) 1. Patient 2. MD, Dr James- Nursing 3. Microbiology/ Labratory 4. Radiology  5./case management 6. Others         Today's Visit:     Patient was extubated 4/25.on NC/HF- has  rough night per nursing- with agitation.  On 2 point restraint.     Afebrile  His aunt in room    Notes/Labs/Cultures and Imaging reports reviewed  
       [facility]  (A Division of [facility])                                                           Date of Admission: 4/18/2024       Reason for Consult:   Referring MD: Dr Candice Smith    C/C: AMS/unresponsiveness    Current Antimicrobials:    Prior Antimicrobials:    Zosyn 4/18 to date  Vanco   NA   Allergy to antibiotics: NA       Assessment--ID related:     Critically ill patient with:    BL pneumonia, dense infiltrate on CT- probable aspiration    Low grade Bacteremia with staph spp- likely procurement contamination     Acute encephalopathy -- Multifactorial-- hypoglycemia/etoh/drugs +/- infection. Brain edema on head MRI    Acute hypercarbic respiratory failure- intubated on admission      Microlab data:    4/18-Viral test /PCR for COVID 19/Influenza A and B -negative.          -Urine antigen for legionealla and pneumococcus is negative          -Blood culture GPC-- staph spp 1/4 bottle-- non S.aureus.epidermis or lugenesis by PCR         -UA clean      Co-morbidities:  Seizure episode-new, DM, neuropathy, Asthma, MDD, cocaine use. ETOH use    Recommendation -- ID related:     Cont Zosyn for aspiration pneumonia.   Agree with DC Vancomycin - gpc likley contaminant  No need to repeat blood culture-- unless clinical changes-- as above is contaminant  FU sputum culture until final  Daily labs per ICU routine       Discussed with ( in bold) 1. Patient / family 2. MD- Dr Terrazas/Nursing 3. Microbiology/ Labratory 4. Radiology  5./case management 6. Others         Today's Visit:       Patient seen and examined in ICU at bedside . Remains intubated/sedated on propfol. Thick secretion per RN.  No pressor  Wife at bedside    Notes/Labs/Cultures and Imaging reports reviewed      HPI:      Louis Sarkar is a 38 y.o. male with PMH as above. Found unresponsive and admitted via ED on 4/18-- with hypoglcyemia of 31, urine screen positive for 
       [facility]  (A Division of [facility])                                                           Date of Admission: 4/18/2024       Reason for Consult:   Referring MD: Dr Candice Smith    C/C: AMS/unresponsiveness    Current Antimicrobials:    Prior Antimicrobials:      Unasyn 4/27 to date  #10     Vanco 4/18 to 20  Zosyn 4/18 to 4/26  Doxy 4/21 to 4/25   Allergy to antibiotics: NA       Assessment--ID related:       BL pneumonia, dense infiltrate on CT- probable aspiration  Procal high-30, declining  FU CXR with pul edema, lung collapse etc  --S/P Bronch 4/24/24--MSSA along with routine resp telma from BAL    LGF with progression on CXR-- due to MSSA pneumonia--Improved    CoNS bacteremia  likely procurement contamination     Acute encephalopathy -- Multifactorial-- hypoglycemia/anoxia /etoh/drugs +/- infection. Brain edema on head MRI. Cognitive status impaired-- Prognosis guarded    Acute hypercarbic respiratory failure- improved, extubated 4/25      Microlab data:    4/18-Viral test /PCR for COVID 19/Influenza A and B -negative.          -Urine antigen for legionealla and pneumococcus is negative          -Blood culture GPC-- staph spp 1/4 bottle-- non S.aureus.epidermis or lugenesis by PCR         -UA clean  4/19 - resp culture: few normal telma  4/20--blood culture: NGSF  4/24- Bronch -- cultures with MSSA moderate growth, resp telma  4/25- HIV non reactive    Co-morbidities:  Seizure episode-new, DM, neuropathy, Asthma, MDD, cocaine use. ETOH use    Recommendation -- ID related:     Resumed Unasyn IV-- ( PO Augmentin if able to take PO/NGT)  to end May 3.  Will sign off. Please call back if questions or concerns. Thanks.      Discussed with ( in bold) 1. Patient 2. MD, Asad Green-- Nursing 3. Microbiology/ Labratory 4. Radiology  5./case management 6. Others         Today's Visit:     Patient is awake, no distress but non verbal, no 
    1053 Page MD Malone per family request stating feel uncomfortable taking out midline on right side and want to speak to MD.     1110 MD Malone page back and speaking to wife on zone phone. Confirmed will remove 2 R midline via MD Malone  
  Hospitalist Progress Note    Patient: Louis Sarkar MRN: [id number]  CSN: [id number]    YOB: 1985  Age: 38 y.o.  Sex: male    DOA: 4/18/2024 LOS:  LOS: 10 days                Assessment/Plan     Active Hospital Problems    Diagnosis     Alcohol use disorder, moderate, dependence (HCC) [F10.20]      Priority: Medium    Delirium [R41.0]     Anoxic encephalopathy (HCC) [G93.1]     Smoker [F17.200]     Unresponsiveness [R41.89]     Acute respiratory failure with hypoxia and hypercarbia (HCC) [J96.01, J96.02]     Cocaine abuse (HCC) [F14.10]     Hypoglycemia [E16.2]     Seizures (HCC) [R56.9]     MDD (major depressive disorder), recurrent severe, without psychosis (HCC) [F33.2]     HTN (hypertension) [I10]     Asthma [J45.909]         Chief complaint :  Unresponsive  38-year-old male with history of alcohol abuse, multidrug use and diabetes admitted with unresponsiveness, hypoglycemia, multifocal pneumonia and hypertensive urgency.    04/25/2024  Patient extubated, he was extremely agitated and required 4 people to hold him, given dose of haldol and started on precedex drip.    CRITICAL CARE PLAN    Resp -   Acute respiratory failure with hypoxia and hypercapnia -   Extubated 04/25/2024.   Oxygen as needed by NC  Possible aspiration pneumonia.  S/p bronch, no significant mucous plug noted  Bronchodilators as needed.  CXR with mild pulmonary edema    ID -   Blood cultures with coag neg staph, likely contaminant  Follow up blood cultures negative.   BAL growing MSSA, GNR  ANTIBIOTICS unasyn.   ID following.    CVS - Monitor HD.   CXR with mild pulm edema  On lasix  HTN - on amlodipine  Avoid betablocker secondary to cocaine use.   Echo with normal LV systolic function, EF of 60-65%    Heme/onc - Follow H&H, plts. INR.    Renal - Trend BUN, Cr, follow I/O. Check and replace Mg, K, phos.    Endocrine -    Hypoglycemia resolved  DM - on lantus, SSI    Neuro/ Pain/ Sedation -   Encephalopathy - 
  Hospitalist Progress Note    Patient: Louis Sarkar MRN: [id number]  CSN: [id number]    YOB: 1985  Age: 38 y.o.  Sex: male    DOA: 4/18/2024 LOS:  LOS: 7 days                Assessment/Plan     Active Hospital Problems    Diagnosis     Alcohol use disorder, moderate, dependence (HCC) [F10.20]      Priority: Medium    Anoxic encephalopathy (HCC) [G93.1]     Smoker [F17.200]     Unresponsiveness [R41.89]     Acute respiratory failure with hypoxia and hypercarbia (HCC) [J96.01, J96.02]     Cocaine abuse (HCC) [F14.10]     Hypoglycemia [E16.2]     Seizures (HCC) [R56.9]     MDD (major depressive disorder), recurrent severe, without psychosis (HCC) [F33.2]     HTN (hypertension) [I10]     Asthma [J45.909]         Chief complaint :  Unresponsive  38-year-old male with history of alcohol abuse, multidrug use and diabetes admitted with unresponsiveness, hypoglycemia, multifocal pneumonia and hypertensive urgency.    CRITICAL CARE PLAN    Resp -   Acute respiratory failure with hypoxia and hypercapnia -   Extubated 04/25/2024.   Possible aspiration pneumonia.  S/p bronch, no significant mucous plug noted  Bronchodilators as needed.  CXR with mild pulmonary edema    ID - .  Blood cultures with coag neg staph, likely contaminant  Follow up blood cultures negative.   ANTIBIOTICS zosyn, doxycycline.   ID following    CVS - Monitor HD.   CXR with mild pulm edema  On lasix  HTN - on amlodipine  Avoid betablocker secondary to cocaine use.   Echo with normal LV systolic function, EF of 60-65%    Heme/onc - Follow H&H, plts. INR.    Renal - Trend BUN, Cr, follow I/O. Check and replace Mg, K, phos.    Endocrine -    Hypoglycemia resolved  DM - on lantus, SSI    Neuro/ Pain/ Sedation -   Encephalopathy - concern for anoxic encephalopathy  MRI brain reviewed  EEG reviewed  Continue keppra per neurology.  Neurology following      GI - NPO for now.   SLP eval and diet per SLP    Prophylaxis - DVT: lovenox, GI: 
  Hospitalist Progress Note    Patient: Louis Sarkar MRN: [id number]  CSN: [id number]    YOB: 1985  Age: 38 y.o.  Sex: male    DOA: 4/18/2024 LOS:  LOS: 9 days                Assessment/Plan     Active Hospital Problems    Diagnosis     Alcohol use disorder, moderate, dependence (HCC) [F10.20]      Priority: Medium    Delirium [R41.0]     Anoxic encephalopathy (HCC) [G93.1]     Smoker [F17.200]     Unresponsiveness [R41.89]     Acute respiratory failure with hypoxia and hypercarbia (HCC) [J96.01, J96.02]     Cocaine abuse (HCC) [F14.10]     Hypoglycemia [E16.2]     Seizures (HCC) [R56.9]     MDD (major depressive disorder), recurrent severe, without psychosis (HCC) [F33.2]     HTN (hypertension) [I10]     Asthma [J45.909]         Chief complaint :  Unresponsive  38-year-old male with history of alcohol abuse, multidrug use and diabetes admitted with unresponsiveness, hypoglycemia, multifocal pneumonia and hypertensive urgency.    04/25/2024  Patient extubated, he was extremely agitated and required 4 people to hold him, given dose of haldol and started on precedex drip.    CRITICAL CARE PLAN    Resp -   Acute respiratory failure with hypoxia and hypercapnia -   Extubated 04/25/2024.   On high flow oxygen  Possible aspiration pneumonia.  S/p bronch, no significant mucous plug noted  Bronchodilators as needed.  CXR with mild pulmonary edema    ID - .  Blood cultures with coag neg staph, likely contaminant  Follow up blood cultures negative.   BAL growing MSSA, GNR  ANTIBIOTICS unasyn.   ID following    CVS - Monitor HD.   CXR with mild pulm edema  On lasix  HTN - on amlodipine  Avoid betablocker secondary to cocaine use.   Echo with normal LV systolic function, EF of 60-65%    Heme/onc - Follow H&H, plts. INR.    Renal - Trend BUN, Cr, follow I/O. Check and replace Mg, K, phos.    Endocrine -    Hypoglycemia resolved  DM - on lantus, SSI    Neuro/ Pain/ Sedation -   Encephalopathy - concern 
  Hospitalist Progress Note    Patient: Louis Sarkar MRN: [id number]  CSN: [id number]    YOB: 1985  Age: 38 y.o.  Sex: male    DOA: 4/18/2024 LOS:  LOS: 10 days                Assessment/Plan     Active Hospital Problems    Diagnosis     Alcohol use disorder, moderate, dependence (HCC) [F10.20]      Priority: Medium    Delirium [R41.0]     Anoxic encephalopathy (HCC) [G93.1]     Smoker [F17.200]     Unresponsiveness [R41.89]     Acute respiratory failure with hypoxia and hypercarbia (HCC) [J96.01, J96.02]     Cocaine abuse (HCC) [F14.10]     Hypoglycemia [E16.2]     Seizures (HCC) [R56.9]     MDD (major depressive disorder), recurrent severe, without psychosis (HCC) [F33.2]     HTN (hypertension) [I10]     Asthma [J45.909]         Chief complaint :  Unresponsive  38-year-old male with history of alcohol abuse, multidrug use and diabetes admitted with unresponsiveness, hypoglycemia, multifocal pneumonia and hypertensive urgency.    04/25/2024  Patient extubated, he was extremely agitated and required 4 people to hold him, given dose of haldol and started on precedex drip.    CRITICAL CARE PLAN    Resp -   Acute respiratory failure with hypoxia and hypercapnia -   Extubated 04/25/2024.   On high flow oxygen  Possible aspiration pneumonia.  S/p bronch, no significant mucous plug noted  Bronchodilators as needed.  CXR with mild pulmonary edema    ID - .  Blood cultures with coag neg staph, likely contaminant  Follow up blood cultures negative.   BAL growing MSSA, GNR  ANTIBIOTICS unasyn.   ID following    [facility] - Monitor HD.   CXR with mild pulm edema  On lasix  HTN - on amlodipine  Avoid betablocker secondary to cocaine use.   Echo with normal LV systolic function, EF of 60-65%    Heme/onc - Follow H&H, plts. INR.    Renal - Trend BUN, Cr, follow I/O. Check and replace Mg, K, phos.    Endocrine -    Hypoglycemia resolved  DM - on lantus, SSI    Neuro/ Pain/ Sedation -   Encephalopathy - concern 
  Hospitalist Progress Note-critical care note     Patient: Louis Sarkar MRN: [id number]  CSN: [id number]    YOB: 1985  Age: 38 y.o.  Sex: male    DOA: 4/18/2024 LOS:  LOS: 21 days            Chief complaint: anoxic encephalopathy, cocaine abuse , seizure      Assessment/Plan         Active Hospital Problems    Diagnosis Date Noted    Alcohol use disorder, moderate, dependence (HCC) [F10.20] 03/29/2018     Priority: Medium    DM type 2 (diabetes mellitus, type 2) (HCC) [E11.9] 05/07/2024    Hypokalemia [E87.6] 05/03/2024    Delirium [R41.0] 04/26/2024    Anoxic encephalopathy (HCC) [G93.1] 04/22/2024    Smoker [F17.200] 04/20/2024    Unresponsiveness [R41.89] 04/18/2024    Acute respiratory failure with hypoxia and hypercarbia (HCC) [J96.01, J96.02] 04/18/2024    Cocaine abuse (HCC) [F14.10] 04/18/2024    Hypoglycemia [E16.2] 04/18/2024    Seizures (HCC) [R56.9] 04/18/2024    MDD (major depressive disorder), recurrent severe, without psychosis (HCC) [F33.2] 03/29/2018    Hypomagnesemia [E83.42] 02/07/2015    HTN (hypertension) [I10] 06/09/2014    Asthma [J45.909] 04/10/2013          Unresponsive  38-year-old male with history of alcohol abuse, multidrug use and diabetes admitted with unresponsiveness, hypoglycemia, multifocal pneumonia and hypertensive urgency.     04/25/2024  Patient extubated, he was extremely agitated and required 4 people to hold him, given dose of haldol and started on precedex drip.now off precedex  still confused from the anoxic encephalopathy   Still confused not follow the command.   Peg tube placed per gi on 5/7 tolerated well ,tube feeding start last night , tolerated well     Hypomagnesemia replaced and resolved will continue monitoring        Acute respiratory failure with hypoxia and hypercapnia -   Extubated 04/25/2024.   Oxygen as needed by NC  Possible aspiration pneumonia.  S/p bronch, no significant mucous plug noted  Bronchodilators as needed.  CXR with 
  Hospitalist Progress Note-critical care note     Patient: Louis Sarkar MRN: [id number]  CSN: [id number]    YOB: 1985  Age: 38 y.o.  Sex: male    DOA: 4/18/2024 LOS:  LOS: 18 days            Chief complaint: anoxic encephalopathy, cocaine abuse , seizure      Assessment/Plan         Active Hospital Problems    Diagnosis Date Noted    Alcohol use disorder, moderate, dependence (HCC) [F10.20] 03/29/2018     Priority: Medium    Hypokalemia [E87.6] 05/03/2024    Delirium [R41.0] 04/26/2024    Anoxic encephalopathy (HCC) [G93.1] 04/22/2024    Smoker [F17.200] 04/20/2024    Unresponsiveness [R41.89] 04/18/2024    Acute respiratory failure with hypoxia and hypercarbia (HCC) [J96.01, J96.02] 04/18/2024    Cocaine abuse (HCC) [F14.10] 04/18/2024    Hypoglycemia [E16.2] 04/18/2024    Seizures (HCC) [R56.9] 04/18/2024    MDD (major depressive disorder), recurrent severe, without psychosis (HCC) [F33.2] 03/29/2018    Hypomagnesemia [E83.42] 02/07/2015    HTN (hypertension) [I10] 06/09/2014    Asthma [J45.909] 04/10/2013          Unresponsive  38-year-old male with history of alcohol abuse, multidrug use and diabetes admitted with unresponsiveness, hypoglycemia, multifocal pneumonia and hypertensive urgency.     04/25/2024  Patient extubated, he was extremely agitated and required 4 people to hold him, given dose of haldol and started on precedex drip.now off precedex  still confused from the anoxic encephalopathy   Still confused not follow the command. Talked with IR for peg tube placement-differ to GI- need order tube per GI -cm will follow it   Received versed last night -will decrease the dose -weaning off , low dose haloidal prn for agitation .     Acute respiratory failure with hypoxia and hypercapnia -   Extubated 04/25/2024.   Oxygen as needed by NC  Possible aspiration pneumonia.  S/p bronch, no significant mucous plug noted  Bronchodilators as needed.  CXR with mild pulmonary edema     HTN 
  Hospitalist Progress Note-critical care note     Patient: Louis Sarkar MRN: [id number]  CSN: [id number]    YOB: 1985  Age: 38 y.o.  Sex: male    DOA: 4/18/2024 LOS:  LOS: 5 days            Chief complaint: acute respiratory failure unresponsivenss cocaine abuse seizure     Assessment/Plan         Active Hospital Problems    Diagnosis Date Noted    Alcohol use disorder, moderate, dependence (HCC) [F10.20] 03/29/2018     Priority: Medium    Anoxic encephalopathy (HCC) [G93.1] 04/22/2024    Smoker [F17.200] 04/20/2024    Unresponsiveness [R41.89] 04/18/2024    Acute respiratory failure with hypoxia and hypercarbia (HCC) [J96.01, J96.02] 04/18/2024    Cocaine abuse (HCC) [F14.10] 04/18/2024    Hypoglycemia [E16.2] 04/18/2024    Seizures (HCC) [R56.9] 04/18/2024    MDD (major depressive disorder), recurrent severe, without psychosis (HCC) [F33.2] 03/29/2018    HTN (hypertension) [I10] 06/09/2014    Asthma [J45.909] 04/10/2013         Acute hypoxic Respiratory failure  Vent management per pulmonary   Bilateral pneumonia  Aspiration  On zosyn and doxy   Continue weaning off vent as tolerated      Hypertensive urgency  Improved     Pnumonia  Staph bacteremia contamanation  ID on broad zosyn and dox      Nuero:  Toxic encephalapthy  Some improvement  Patient tearing  Neurologist on board eeg done abnormal     Gi  Npo  Iv protonix     Fen  Icu electrolyte protocol    Endo hypoglycemia -resolved     Prognosis still guarded       Wife was at the bedside     30 minutes of critical care time spent in the direct evaluation and treatment of this high risk patient. The reason for providing this level of medical care for this critically ill patient was due a critical illness that impaired one or more vital organ systems such that there was a high probability of imminent or life threatening deterioration in the patients condition. This care involved high complexity decision making to assess, manipulate, 
  Hospitalist Progress Note-critical care note     Patient: Louis Sarkar MRN: [id number]  CSN: [id number]    YOB: 1985  Age: 38 y.o.  Sex: male    DOA: 4/18/2024 LOS:  LOS: 13 days            Chief complaint: anoxic encephalopathy, cocaine abuse , seizure      Assessment/Plan         Active Hospital Problems    Diagnosis Date Noted    Alcohol use disorder, moderate, dependence (HCC) [F10.20] 03/29/2018     Priority: Medium    Delirium [R41.0] 04/26/2024    Anoxic encephalopathy (HCC) [G93.1] 04/22/2024    Smoker [F17.200] 04/20/2024    Unresponsiveness [R41.89] 04/18/2024    Acute respiratory failure with hypoxia and hypercarbia (HCC) [J96.01, J96.02] 04/18/2024    Cocaine abuse (HCC) [F14.10] 04/18/2024    Hypoglycemia [E16.2] 04/18/2024    Seizures (HCC) [R56.9] 04/18/2024    MDD (major depressive disorder), recurrent severe, without psychosis (HCC) [F33.2] 03/29/2018    HTN (hypertension) [I10] 06/09/2014    Asthma [J45.909] 04/10/2013          Unresponsive  38-year-old male with history of alcohol abuse, multidrug use and diabetes admitted with unresponsiveness, hypoglycemia, multifocal pneumonia and hypertensive urgency.     04/25/2024  Patient extubated, he was extremely agitated and required 4 people to hold him, given dose of haldol and started on precedex drip.now off precedex    Still confused not follow the command. Goal of care discussed with wife and she is open to talk about it and she agrees with peg tube insertion      Acute respiratory failure with hypoxia and hypercapnia -   Extubated 04/25/2024.   Oxygen as needed by NC  Possible aspiration pneumonia.  S/p bronch, no significant mucous plug noted  Bronchodilators as needed.  CXR with mild pulmonary edema     HTN - continue current medication   Avoid betablocker secondary to cocaine use.   Echo with normal LV systolic function, EF of 60-65%     Hypoglycemia resolved  DM - on lantus, SSI     Encephalopathy - concern for 
  Hospitalist Progress Note-critical care note     Patient: Louis Sarkar MRN: [id number]  [facility]: [id number]    YOB: 1985  Age: 38 y.o.  Sex: male    DOA: 4/18/2024 LOS:  LOS: 12 days            Chief complaint: anoxic encephalopathy, cocaine abuse , seizure      Assessment/Plan         Active Hospital Problems    Diagnosis Date Noted    Alcohol use disorder, moderate, dependence (HCC) [F10.20] 03/29/2018     Priority: Medium    Delirium [R41.0] 04/26/2024    Anoxic encephalopathy (HCC) [G93.1] 04/22/2024    Smoker [F17.200] 04/20/2024    Unresponsiveness [R41.89] 04/18/2024    Acute respiratory failure with hypoxia and hypercarbia (HCC) [J96.01, J96.02] 04/18/2024    Cocaine abuse (HCC) [F14.10] 04/18/2024    Hypoglycemia [E16.2] 04/18/2024    Seizures (HCC) [R56.9] 04/18/2024    MDD (major depressive disorder), recurrent severe, without psychosis (HCC) [F33.2] 03/29/2018    HTN (hypertension) [I10] 06/09/2014    Asthma [J45.909] 04/10/2013          Unresponsive  38-year-old male with history of alcohol abuse, multidrug use and diabetes admitted with unresponsiveness, hypoglycemia, multifocal pneumonia and hypertensive urgency.     04/25/2024  Patient extubated, he was extremely agitated and required 4 people to hold him, given dose of haldol and started on precedex drip.now off precedex       Acute respiratory failure with hypoxia and hypercapnia -   Extubated 04/25/2024.   Oxygen as needed by NC  Possible aspiration pneumonia.  S/p bronch, no significant mucous plug noted  Bronchodilators as needed.  CXR with mild pulmonary edema     HTN - continue current medication   Avoid betablocker secondary to cocaine use.   Echo with normal LV systolic function, EF of 60-65%     Hypoglycemia resolved  DM - on lantus, SSI     Encephalopathy - concern for anoxic encephalopathy  MRI brain reviewed  EEG reviewed  Continue keppra per neurology.  Neurology following  Agitation - halodol and sitter   UDS 
  Hospitalist Progress Note-critical care note     Patient: Louis Sarkar MRN: [id number]  CSN: [id number]    YOB: 1985  Age: 38 y.o.  Sex: male    DOA: 4/18/2024 LOS:  LOS: 20 days            Chief complaint: anoxic encephalopathy, cocaine abuse , seizure      Assessment/Plan         Active Hospital Problems    Diagnosis Date Noted    Alcohol use disorder, moderate, dependence (HCC) [F10.20] 03/29/2018     Priority: Medium    DM type 2 (diabetes mellitus, type 2) (HCC) [E11.9] 05/07/2024    Hypokalemia [E87.6] 05/03/2024    Delirium [R41.0] 04/26/2024    Anoxic encephalopathy (HCC) [G93.1] 04/22/2024    Smoker [F17.200] 04/20/2024    Unresponsiveness [R41.89] 04/18/2024    Acute respiratory failure with hypoxia and hypercarbia (HCC) [J96.01, J96.02] 04/18/2024    Cocaine abuse (HCC) [F14.10] 04/18/2024    Hypoglycemia [E16.2] 04/18/2024    Seizures (HCC) [R56.9] 04/18/2024    MDD (major depressive disorder), recurrent severe, without psychosis (HCC) [F33.2] 03/29/2018    Hypomagnesemia [E83.42] 02/07/2015    HTN (hypertension) [I10] 06/09/2014    Asthma [J45.909] 04/10/2013          Unresponsive  38-year-old male with history of alcohol abuse, multidrug use and diabetes admitted with unresponsiveness, hypoglycemia, multifocal pneumonia and hypertensive urgency.     04/25/2024  Patient extubated, he was extremely agitated and required 4 people to hold him, given dose of haldol and started on precedex drip.now off precedex  still confused from the anoxic encephalopathy   Still confused not follow the command.   Peg tube placed per gi on 5/7 tolerated well     Hypomagnesemia mg replacement before tube feeding   Will replace mg and recheck then start low rate tube feeding        Acute respiratory failure with hypoxia and hypercapnia -   Extubated 04/25/2024.   Oxygen as needed by NC  Possible aspiration pneumonia.  S/p bronch, no significant mucous plug noted  Bronchodilators as needed.  CXR 
  Hospitalist Progress Note-critical care note     Patient: Louis Sarkar MRN: [id number]  CSN: [id number]    YOB: 1985  Age: 38 y.o.  Sex: male    DOA: 4/18/2024 LOS:  LOS: 4 days            Chief complaint: acute respiratory failure unresponsivenss cocaine abuse seizure     Assessment/Plan         Active Hospital Problems    Diagnosis Date Noted    Alcohol use disorder, moderate, dependence (HCC) [F10.20] 03/29/2018     Priority: Medium    Smoker [F17.200] 04/20/2024    Unresponsiveness [R41.89] 04/18/2024    Acute respiratory failure with hypoxia and hypercarbia (HCC) [J96.01, J96.02] 04/18/2024    Cocaine abuse (HCC) [F14.10] 04/18/2024    Hypoglycemia [E16.2] 04/18/2024    Brain edema (HCC) [G93.6] 04/18/2024    Seizures (HCC) [R56.9] 04/18/2024    MDD (major depressive disorder), recurrent severe, without psychosis (HCC) [F33.2] 03/29/2018    HTN (hypertension) [I10] 06/09/2014    Asthma [J45.909] 04/10/2013         Acute hypoxic Respiratory failure  Vent management per pulmonary   Bilateral pneumonia  Aspiration  On zosyn and doxy      Hypertensive urgency  Improved     Pnumonia  Staph bacteremia contamanation  ID on broad zosyn and dox      Nuero:  Toxic encephalapthy  Some improvement  Patient tearing  Neurologist on board eeg done abnormal     Gi  Npo  Iv protonix     Fen  Icu electrolyte protocol    Prognosis still guarded     Rn stable    Disposition :tbd,     Wife was at the bedside     30 minutes of critical care time spent in the direct evaluation and treatment of this high risk patient. The reason for providing this level of medical care for this critically ill patient was due a critical illness that impaired one or more vital organ systems such that there was a high probability of imminent or life threatening deterioration in the patients condition. This care involved high complexity decision making to assess, manipulate, and support vital system functions, to treat this 
  Hospitalist Progress Note-critical care note     Patient: Louis Sarkar MRN: [id number]  CSN: [id number]    YOB: 1985  Age: 38 y.o.  Sex: male    DOA: 4/18/2024 LOS:  LOS: 6 days            Chief complaint: acute respiratory failure unresponsivenss cocaine abuse seizure     Assessment/Plan         Active Hospital Problems    Diagnosis Date Noted    Alcohol use disorder, moderate, dependence (HCC) [F10.20] 03/29/2018     Priority: Medium    Anoxic encephalopathy (HCC) [G93.1] 04/22/2024    Smoker [F17.200] 04/20/2024    Unresponsiveness [R41.89] 04/18/2024    Acute respiratory failure with hypoxia and hypercarbia (HCC) [J96.01, J96.02] 04/18/2024    Cocaine abuse (HCC) [F14.10] 04/18/2024    Hypoglycemia [E16.2] 04/18/2024    Seizures (HCC) [R56.9] 04/18/2024    MDD (major depressive disorder), recurrent severe, without psychosis (HCC) [F33.2] 03/29/2018    HTN (hypertension) [I10] 06/09/2014    Asthma [J45.909] 04/10/2013           Respiratory   Acute hypoxic Respiratory failure  Vent management per pulmonary -vent had to adjust due to desat last night   Cxr :Increased pulmonary edema with small left effusion. Low lung  volumes.-on lasix now   Possible bronch     Bilateral pneumonia  Aspiration  On zosyn and doxy   Continue weaning off vent as tolerated      Asthma breathing tx     Cvs   Hypertensive urgency  Improved     Pnumonia  Staph bacteremia contamanation  ID on broad zosyn and dox      Nuero:  Toxic encephalopathy-anoxic encephalopathy    Neurologist on board eeg done abnormal   Continue sedation vacation     Seizure : keppra continue  seizure  iv  seizure precaution     Gi  Tube feeding   Iv protonix     Fen  Icu electrolyte protocol    Endo hypoglycemia -resolved       Wife was at the bedside     Rn desat last night , open eyes , but no tracking     Prognosis is poor palliative care f/u since admission     30 minutes of critical care time spent in the direct evaluation and 
  Hospitalist Progress note     Patient: Louis Sarkar MRN: [id number]  CSN: [id number]    YOB: 1985  Age: 38 y.o.  Sex: male    DOA: 4/18/2024 LOS:  LOS: 17 days            Chief complaint: anoxic encephalopathy, cocaine abuse , seizure      Assessment/Plan         Active Hospital Problems    Diagnosis Date Noted    Alcohol use disorder, moderate, dependence (HCC) [F10.20] 03/29/2018     Priority: Medium    Hypokalemia [E87.6] 05/03/2024    Delirium [R41.0] 04/26/2024    Anoxic encephalopathy (HCC) [G93.1] 04/22/2024    Smoker [F17.200] 04/20/2024    Unresponsiveness [R41.89] 04/18/2024    Acute respiratory failure with hypoxia and hypercarbia (HCC) [J96.01, J96.02] 04/18/2024    Cocaine abuse (HCC) [F14.10] 04/18/2024    Hypoglycemia [E16.2] 04/18/2024    Seizures (HCC) [R56.9] 04/18/2024    MDD (major depressive disorder), recurrent severe, without psychosis (HCC) [F33.2] 03/29/2018    Hypomagnesemia [E83.42] 02/07/2015    HTN (hypertension) [I10] 06/09/2014    Asthma [J45.909] 04/10/2013          Unresponsive  38-year-old male with history of alcohol abuse, multidrug use and diabetes admitted with unresponsiveness, hypoglycemia, multifocal pneumonia and hypertensive urgency.     04/25/2024  Patient extubated, he was extremely agitated and required 4 people to hold him, given dose of haldol and started on precedex drip.now off precedex    Still confused not follow the command. Talked with IR for peg tube placement-differ to GI- need order tube per GI -cm will follow it   Received versed last night -will decrease the dose -weaning off , low dose haloidal prn for agitation .    Hypomagnesemia   Mg replacement     Hypokalemia   K replacement       Acute respiratory failure with hypoxia and hypercapnia -   Extubated 04/25/2024.   Oxygen as needed by NC  Possible aspiration pneumonia.  S/p bronch, no significant mucous plug noted  Bronchodilators as needed.  CXR with mild pulmonary 
  Medications wasted with Ishan RN     Versed 75 mL  Fentanyl 60 mL     
  Physical Therapy Goals:  Initiated 5/6/2024 to be met within 7-10 days.  Short Term Goals  Short Term Goal 1: Patient will perform volitional movement LE's for participation with AROM in order to facilitate purposeful movement for assist with bed mobility tasks.  Short Term Goal 2: Patient will perform rolling L/R with max/total assist for improved functional mobility.        PHYSICAL THERAPY TREATMENT    Patient: Louis Sarkar (38 y.o. male)  Date: 5/6/2024  Diagnosis: Seizure ([facility]) [R56.9]  Hypoglycemia [E16.2]  Polysubstance abuse ([facility]) [F19.10]  Acute respiratory failure with hypoxia ([facility]) [J96.01]  Unresponsiveness [R41.89]  Aspiration pneumonia of both lower lobes, unspecified aspiration pneumonia type ([facility]) [J69.0] Unresponsiveness  Procedure(s) (LRB):  BRONCHOSCOPY; LAVAGE (N/A) 12 Days Post-Op  Precautions: Aspiration Risk, Fall Risk  PLOF: Independent PTA    ASSESSMENT:  Pt found supine, activity, spontaneously and repeatedly flexing/extending L knee and raising L UE overhead with elbow flexed. Nurse Lida present attending to IV BLUE.  Pt eyes closed and barely opening occasionally.  Pt appears in NAD.  Pt received PROM BLE's and BUE's with gentle Achilles tendon stretch, as well as end range jt  stretch Ue's due to increased tone intermittently. Noted R UE/LE increased tone >L.  Pt does not follow commands for extremity volitional movement or opening eyes, despite sternal rub.  Did note pt to open eyes to look at wife while turning head toward L side while wife standing to left of pt.  ? Spontaneous gaze vs intentional.  Pt left moved up in bed via [place] mattress and left with HOB elevated slightly.  All lines intact and nurse Lida made aware of above.  Will continue IPPT  for above POC.     Assessment  Activity Tolerance: Treatment limited secondary to decreased cognition;Treatment limited secondary to medical complications    Progression toward goals:   []      Improving appropriately 
  Physical Therapy Goals:  Initiated 5/9/2024 to be met within 7-10 days.  Short Term Goals  Short Term Goal 1: Patient will perform volitional movement LE's for participation with AROM in order to facilitate purposeful movement for assist with bed mobility tasks.  Short Term Goal 2: Patient will perform rolling L/R with max/total assist for improved functional mobility.    []  Patient has met MD david henriquez for d/c home   []  Recommend HH with 24 hour adult care   [x]  Benefit from additional acute PT session to address:   any change in command following and functional mobility, placement needed      PHYSICAL THERAPY TREATMENT    Patient: Louis Sarkar (38 y.o. male)  Date: 5/9/2024  Diagnosis: Seizure (HCC) [R56.9]  Hypoglycemia [E16.2]  Polysubstance abuse (HCC) [F19.10]  Acute respiratory failure with hypoxia (HCC) [J96.01]  Unresponsiveness [R41.89]  Aspiration pneumonia of both lower lobes, unspecified aspiration pneumonia type ([facility]) [J69.0] Unresponsiveness  Procedure(s) (LRB):  ESOPHAGOGASTRODUODENOSCOPY PERCUTANEOUS ENDOSCOPIC GASTROSTOMY TUBE INSERTION (N/A) 2 Days Post-Op  Precautions: Aspiration Risk, Fall Risk,  ,  ,  ,  ,  ,  ,    PLOF: independent, ambulatory, drug abuse    ASSESSMENT:  Assessment  Assessment: Pt supine in bed, 2 family members present.  Non verbal.  Consistently moving head and LUE to face.  The only command followed was to move the ankles in which he only perfomed on the R through a very short range.  PROM with pt resisting performed on the LEs, RLE more mobile then LLE.  LLE pt is so resistant the only PROM performed was hip abd/add.  Pt freely moves the LUE but not on command, PROM performed on the LLE with pt resisting.  Attempted ROM on the LEs again s/p UEs and no change in performance.  Activity Tolerance: Other (comment) (brain injury)  Discharge Recommendations: Long Term Care with PT;Subacute/Skilled Nursing Facility    Progression toward goals:   [] 
  Verbal report given to Waleska LYONS, on Louis Sarkar  being transferred to [facility].     Report consisted of patient's Situation, Background, Assessment and   Recommendations(SBAR).     Information from the following report(s) Nurse Handoff Report, Index, Intake/Output, MAR, Recent Results, and Med Rec Status was reviewed with the receiving nurse.    Opportunity for questions and clarification was provided.      Patient transported with: Transport  
 conducted a follow up visit with Marltonus YOSELIN Sarkar, who is a 38 y.o.,male.      Patient's wife Coretta and later other family members were at the bedside.  Patient had been extubated and everyone was celebrating that and relieved.  But patient was making \"strange\" movements and not responding to them.  Wife explained her fears and being so overwhelmed.  Their middle child (14 years old) has been \"missing for a couple of months.  But mom is somewhat relieved that he is texting his brother (15 yo). So she knows he's \"safe-derrek\"  \"Just so much. So much.\"  Their 12 yo daughter will come up to see her dad later tonight.     Continued the relationship of care and support.   Listened empathically.  Offered prayer and assurance of continued prayer on patients behalf.   Chart reviewed.  Family expressed gratitude for Spiritual Care visit.    Chaplains will continue to follow and will provide pastoral care as needed or requested.   recommends bedside caregivers page the  on duty if patient shows signs of acute spiritual or emotional distress.      Noelle Desir MDiv.   Board Certified   [phone] - Office  
 conducted an initial consultation and spiritual assessment for Louis Sarkar, who is a 38 y.o.,male.     Patient is vented and unresponsive to me.  Wife and sister at the bedside both overwhelmed and teary.   came in from [facility] and had prayers with them.    1300 - Met with wife alone in room. Pt out for a test. They have a 15 yo at [facility], a 13 yo at [facility] and an 10 yo at [employer].  Pt grew up in this area and has lots of family around. Wife is from [place] but her mother is here.  Children know some of what is going on.     Initiated a relationship of care and support.    confirmed Patient's Orthodoxy Affiliation.  Offered prayer and assurance of continued prayers on patients behalf.   Chart reviewed.    Chaplains will continue to follow and will provide pastoral care as needed or requested.    recommends bedside caregivers page  on duty if patient shows signs of acute spiritual or emotional distress.    Chaplain Noelle Desir M.Div.  Board Certified   [phone] - Office  
-Called Dr. Malone since the patient has no telemetry monitoring ordered. She is aware.  
-Coretta Pa, listed as primary decision maker, was notified of the patients transfer to room 302 at this time.  
-PM nurse notified me that the patient was not able to tolerate NGT insertion.  -The patient has a tube feed order, but has no tube feed access.  
1050 Family stated wants to have IV fluids changed. Wants Dextrose to be half because \"it is too much sugar for him\" mom stated \"I don't like too much toxins in the body and his sugar is always high even his urine is darker. Nurse educated mother stating that urine can also be darker from dehydration and may need the fluids. Mother stated would like dextrose decreased and would be okay if he needs another bag of regular fluids added.     1055 Page MD Malone regarding family requests.     1103 MD Malone stated needs to have dextrose can't be replaced but will fix order to reduce dextrose and change sliding scale.     1411 Mom is asking if regular fluid can be added in addition to the Dextrose.   
1148- 90cc of Propofol wasted with DINAH Baker, RN  
1148- Wasted 90 cc of propofol with RN  
1200  Peg tube feeding bottle replaced with a new set. Dose rate increased by 10 ml per order. Rate currently running at 55 ml/hr, and flush at 190 ml/hr.  
1300  Nurse at bed side. Patient hasn't voided since shift started. Bladder scanned, 193 ml urine in bladder. 5% and 0.45% sodium chloride continuous fluid still infusing at 25 ml/hr.  
1300-CM verified the SNF facility has the tube feeding for the patient.     1700-CM reviewed the physician's order to stop the tube feeding for transport and resume upon admission to [facility] with the primary RN.   
1700  At 1630 reassessment, this RN observed small area of redness in the R AC and +2 edema to hand; pulse +1. Elevated arm with pillows.     1855  No changes noted to RUE. Ice applied for comfort.  
1900: Bedside and Verbal shift change report given to JOSE Quiroz (oncoming nurse) by MARCOS Londono RN (offgoing nurse). Report included the following information Nurse Handoff Report, Intake/Output, MAR, Recent Results, Med Rec Status, Cardiac Rhythm Sinus Rhythm, Alarm Parameters, Quality Measures, and Neuro Assessment.   Family at bedside.     2000:     
1900: Bedside and Verbal shift change report given to JOSE Quiroz RN (oncoming nurse) by   (offgoing nurse). Report included the following information Nurse Handoff Report, Index, Intake/Output, MAR, Recent Results, Med Rec Status, Cardiac Rhythm Sinus Rhythm, Alarm Parameters, Quality Measures, Neuro Assessment, and Event Log.     2000: [facility] on unit for status update. All questions answered to satisfaction without warranted clarification.         
1900: Bedside and Verbal shift change report given to JOSE Quiroz RN (oncoming nurse) by GILDA Simpson RN (offgoing nurse). Report included the following information Nurse Handoff Report, Index, Adult Overview, Intake/Output, MAR, Recent Results, Med Rec Status, Cardiac Rhythm Sinus Rhythm, Alarm Parameters, Quality Measures, Neuro Assessment, and Event Log.   Family at bedside at this time.    2000: Shift assessment completed. Pt responds to painful stimuli with involuntary non-purposeful movement, no command following or eye tracking noted. Pt tolerating ventilator settings with periodic coughing spells with noted desats requiring increased sedation. (SEE MAR) Midline IVs x4, Stanley, ETT, OGT in place and patent.     0000: Reassessment completed. Pt required PRN IVP for sedation. Pt now relaxed and complying with ventilator.    0400: Reassessment completed. Pt relaxed and complying with current ventilator settings.     RT at bedside collecting ABG specimen.    SEE MAR for sedation increases for coughing and ventilator resistance.     Bedside and Verbal shift change report given to JOSE Fitzgerald and RUBY Roberts (oncoming nurse) by JOSE Quiroz RN (offgoing nurse). Report included the following information Nurse Handoff Report, Index, Intake/Output, MAR, Recent Results, Med Rec Status, Cardiac Rhythm Sinus Rhythm, RN, Alarm Parameters, Neuro Assessment, and Event Log.     
1900: Bedside and Verbal shift change report given to JOSE uQiroz RN (oncoming nurse) by MAT Park RN (offgoing nurse). Report included the following information Nurse Handoff Report, Index, Intake/Output, MAR, Recent Results, Med Rec Status, Cardiac Rhythm Sinus Rhythm/Sinus Patricio, Alarm Parameters, Neuro Assessment, and Event Log.     2000: Shift assessment completed. Pt A&Ox0, impulsive, not following commands or tracking with eyes. Male external catheter and BUE PIVx4 in place, left arm access not in use due to DVTs. Restraints in place. Sitter at bedside.   Wife and son at bedside.     0000: Reassessment completed.    0400: Reassessment completed.       
1930 - Bedside report received from EVANGELINA Simpson RN. Wife at bedside, requests to make patient private and gave list of visitors able to come and see patient, list is in the patient's chart.   
1930- Report and care received, assessment completed per flow sheet. Family at bedside. Opens eyes spontaneously at times, moves head from side to side at times and occasionally moves arms. No command following.     2019- Restless, moving around in bed, precedex increased.    2059- Agitated, restless, swinging arms and legs over rail, getting tangled in IV tubing and wires. See MAR, soft bilateral wrist restraints applied to protect integrity of lines/tubes.     2107-  updated, orders received.     2149- Increased agitation, kicking in bed, attempting to sit up and swinging legs OOB. Kicking bed board with feet. Dr.Mohammed baptiste, MD to place orders, see MAR.     2300- Reassessment completed, calm, resting quietly.     0235-  notified of /89 and HR 40-50s.   
2:40 Pm-Patient transported by ER RN and respiratory, 2x RN at bedside, 98.3 temperature, weight 126.9 kilos, Patient unresponsive, labs drawn vitals stable.   
3195-8111- Report and care received, agitated, eyes open but not making eye contact, makes occasional sounds but nonverbal. Rolling in bed and swinging arms and legs over side rails, getting tangled in cords, strong. Requiring several people to keep in bed and ensure safety.  paged, soft bilateral wrist restraints placed to protect integrity of lines/tubes, however, still sitting up in bed and thrashing around.     2009-  updated, orders received.    2012- Extreme agitation continued, banging arms, legs and head against rale. Side rails remain padded, pillows positioned for safety, staff and family present. Haldol administered per orders.     2028- Continued to be dangerously agitated, precedex started per orders.     2042- Agitated, thrashing in bed, still requiring 4 people to keep in bed and safe. SPO2 88%,  down to see, nasal cannula increased to 6 liters, RT notified and to place high flow nasal cannula per MD orders.     2100- Slightly less agitated, SPO2 improved on high flow.    2130- CNA in to sit with patient, agitation continues to improve.     2330- Reassessment completed, currently calm. Does become restless briefly when stimulated, however, settles down when not disturbed.     0330- Reassessment completed.   
4/29/2024 PT note: F/up for PT consult; chart reviewed.   Spoke with pt nurse Sruthi who confirms pt remains with spontaneous, but non-purposeful movement of extremities, eyes closed, and not following commands; not appropriate for PT evaluation/intervention at this time.  Will f/up for attempt of PT evaluation as medically appropriate. Thank you for this referral.   Hortencia, PT        
5/7/2024  PT treatment not completed due to:  [x] Off Unit for testing/procedure  [] Pain  [] Eating  [] Patient too lethargic  [] Nausea/vomiting  [] Dialysis treatment in progress   [x] Other: pt leaving unit for PEG tube placement.  Will f/u at later time as pt schedule allows.  Thank you.   ENMA Berry, PT      
Anoxic encephalopathy in a 37 yo diabetic male unable to swallow. After being extubated for bilateral aspiration penumonia.  He needs a feeding PEG tube. We are going to insert the PEG tube tomorrow. Because of his alcohol and drug dependence I may have to ask anesthesia to sedate him.   CT HEAD WO CONTRAST on 4/18/2024  INDICATION: Altered mental status COMPARISON: None. CONTRAST: None. TECHNIQUE: Unenhanced CT of the head was performed using 5 mm images. Brain and bone windows were generated. Coronal and sagittal reformats. CT dose reduction was achieved through use of a standardized protocol tailored for this examination and automatic exposure control for dose modulation.  FINDINGS: There is suggestion overall of loss of the gray-white junction and diffuse sulcal effacement.. 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 There is no intracranial hemorrhage, extra-axial collection, or mass effect. The basilar cisterns are open. No CT evidence of acute infarct. The bone windows demonstrate no abnormalities. The visualized portions of the paranasal sinuses and mastoid air cells are clear.      Suggestion overall of loss of the gray-white junction and diffuse sulcal effacement which is concerning for cerebral edema, please correlate clinically. No acute hemorrhage is identified.   
Assumed care of patient as of this time. Patient's condition at status quo, no purposeful interactions noted. Sitter at bedside.     0710    Bedside and Verbal shift change report given to CINDY Mcmanus (oncoming nurse) by CINDY Cates (offgoing nurse). Report included the following information Nurse Handoff Report, Adult Overview, Intake/Output, MAR, and Recent Results.                
Bedside and Verbal shift change report given to CINDY Bruce (oncoming nurse) by CINDY Hilton (offgoing nurse). Report included the following information Nurse Handoff Report, Adult Overview, Intake/Output, and MAR.     
Bedside and Verbal shift change report given to CINDY Cates (oncoming nurse) by CINDY Hilton (offgoing nurse). Report included the following information Nurse Handoff Report, Adult Overview, Intake/Output, and MAR.    
Bedside and Verbal shift change report given to CINDY Zuniga (oncoming nurse) by CINDY Hilton (offgoing nurse). Report included the following information Nurse Handoff Report, Adult Overview, Intake/Output, and MAR.    
Bedside and Verbal shift change report given to Grace (oncoming nurse) by Janis (offgoing nurse). Report included the following information Nurse Handoff Report, Index, Intake/Output, and MAR.    
Bedside and Verbal shift change report received from CINDY Cortes.     Assumed care of patient as of this time. Patient remains to be non-verbal, no purposeful interactions noted. Sitter at bedside.     PEG feeding rate adjusted accordingly as ordered.   
Bedside shift change report given to Efrain WHITE (oncoming nurse) by Claritza Friedman RN   (offgoing nurse). Report included the following information Nurse Handoff Report, Index, Intake/Output, MAR, and Recent Results.     
CM Team present to patient room to obtain the family's preference. Patient's mother at bedside, who advised patient's wife has departed from facility and is reachable by phone.     SW call patient wife regarding their preference. The wife agreed to patient discharging to the [facility] and for transportation to be arranged. BALTA updated [facility], who    12:45 pm - Called [employer] # [phone] - spoke to Rep Knight and received trip# [id number]. Omar to call the nurse's station at [phone] once transportation is confirmed. BALTA updated floor nurse and unit secretary.    SW to continue to follow.      Hector Smith, MSW  Supervisee in Social Work  Care Management Department  
CM notes patient is pending PEG placement.     ANSHU met with Humaira in GI who reported Dr. Noe Andre has the PEG tube equipment and plans to place PEG later today.   
CM notes patient remains intubated, working on SBT trials. CM notes plans for possible trach and PEG as needed. CM tp continue to follow to assist in identifying any discharge planning needs.   
CM rounded with provider who reported GI plans to place mushroom/bumper style PEG tube and asked CM to follow up on progress of ordering the tube. CM met with Humaira in GI who reported that the barrier is tubes have been on back order for many months, and that plan A is to get a replacement tube from IR, and plan B is to get the needed supplies from [facility] sent via courrier today/overnight. Per Humaira the plan for PEG insertion is tomorrow, most likely in the evening due to the GI surgeon's caseload.   
CM spoke with patient's spouse who reported she has agreed to have PEG tube placed and agreed for referrals to be sent to all the SNF in [facility] and [place].   
Care  assisting ARACELI Galeano Care Mgr with Discharge Planning needs for patient.   Referrals sent to area SNF's for review and consideration for placement.    Will follow.  
Care  assisting Care Mgr ARACELI Galeano with Discharge Planning needs for patient.  Updates sent to area SNF's in [place] for review and consideration for placement.    Will follow along for further discharge planning needs.  
Care  assisting Care Mgr, Sera Parmar RN and ARACELI Galeano with Discharge Planning needs for patient.  Referrals and updates sent to area SNF's for review and consideration for placement.    Will follow along for further needs.  
Clinical update sent to area SNF's.  
[facility]   Pharmacy Pharmacokinetic Monitoring Service - Vancomycin     Louis Sarkar is a 38 y.o. male starting on vancomycin therapy for Sepsis of Unknown Etiology ( until MRSA ruled out ). Pharmacy consulted by Dr. Andrew for monitoring and adjustment.    Target Concentration: Goal AUC/MILLICENT 400-600 mg*hr/L    Additional Antimicrobials: Zosyn    Pertinent Laboratory Values:   Wt Readings from Last 1 Encounters:   04/19/24 127 kg (280 lb)     Temp Readings from Last 1 Encounters:   04/19/24 99 °F (37.2 °C) (Oral)     Estimated Creatinine Clearance: 128 mL/min (based on SCr of 1.1 mg/dL).  Recent Labs     04/18/24  1448 04/18/24  1602 04/19/24  0505 04/19/24  1035   CREATININE 0.74   < > 1.07 1.10   BUN 10  --  16 15   WBC 6.5  --  13.1  --     < > = values in this interval not displayed.       Plan:  Dosing recommendations based on Bayesian software  Patient received Vancomycin 1500 mg IV 4/18/2024 at 14:04, will continue with Vancomycin 1500 mg IV q12hrs  Anticipated AUC of 479 mg/l.hr and Trough concentration of 13.8 mg/l at steady state  Renal labs as indicated   Pharmacy will continue to monitor patient and adjust therapy as indicated    MELANIE ROBERTSON RPH, [facility]  4/19/2024 12:55 PM   [phone]  
Decreased fio2 to 40%  
Decreased rate to 20  
EEG done and Dr. Rousseau was notified.  
Extubated Pt and placed on 4L NC  
Extubated but extremely agitated required 4 people to hold him down  Give one dose of haldol then start precedex drip  
From discharge huddle patient is medically ready for discharge. The Care Team, which include BALTA, RN and Care Management Manage met with patient's wife regarding discharge planning and explained available options: SNF placement or home with  services. The wife agreed for the CM department to follow in an hour to give her more time  to research with family members about their ability to care for her spouse at home. CM department to continue to follow.     SW received notification [facility] Admission Rep, Dory that they are able to take patient after 5pm. SW to follow up with family regarding their preference for discharge.     Hector Smith, ARACELI  Supervisee in Social Work  Care Management Department  
From discharge huddle, patient is still pending the PEG procedures. On 5/3, Family requested SNF vs HH. CMS to expand SNF and resend referrals to SNF that indicated patient did not meet criteria. SW to continue to follow.       3:25 pm- SW called and spoke with patient's Coretta Pa # [phone] and updated her regarding SNF referrals. SW will continue to follow.      Hector Smith, MSW  Supervisee in Social Work  Care Management Department    
ICU    Chart reviewed; patient on ventilator support and stable oxygen saturations  Appropriate for bronchoscopy evaluation  Patient on ventilator support via endotracheal tube and sedated  Medication reviewed  Tube feed on hold    Planned for diagnostic bronchoscopy at bedside in the ICU, and lavage.  Discussed with wife and obtained informed consent.  No significant risks for bleeding or pneumothorax.      Angel James MD    
Medications wasted with CINDY Cary    Versed 75 mL  Fentanyl 60 mL  
Midline removed. 24 inserted in hand for continuous fluids at this time as limb alert on R. 2 attempts by Marta GARCIA RN. Patient is more awake. Continously thrashes around in bed and ttempts to put legs over rail. Still no verbal or meaningful stimuli from the patient. Able to open eyes for and stare but no meaning contact or tracking. Patient  is rubbing face slight redness on face. Sclera WNL.   Patient appeared to be able to follow command with turning. But no additional meaningful contact at this time    Sitter at bedside throughout the night.       Patient changed condom cath applied.       Patient opened eyes frequently but does not track.   
NEUROLOGY PROGRESS NOTE        Patient: Louis Sarkar        Sex: male          DOA: 4/18/2024  YOB: 1985      Age:  38 y.o.         LOS: 2 days     Identification:  38-year-old gentleman with history of diabetes, alcohol use, hypertension and hyperlipidemia, who presented with unresponsiveness in the setting of profound hypoglycemia              SUBJECTIVE:   The patient is examined without any sedation. He may become restless without sedation. His brain MRI shows abnormal findings suggestive of anoxic brain injury. He is intubated. EEG showed generalized slowing and periodic lateralized epileptiform discharges on both sides, more on the right.    REVIEW OF SYSTEMS: I am unable to review the systems. The patient is intubated.    OBJECTIVE:    Visit Vitals  /76   Pulse 100   Temp 98.8 °F (37.1 °C) (Oral)   Resp 24   Ht 1.829 m (6')   Wt 127 kg (279 lb 15.8 oz)   SpO2 97%   BMI 37.97 kg/m²     Physical Exam:  GEN: the patient is somnolent, intubated.  EYES: conjunctiva normal, lids with out lesions  HEENT: MMM. ET tube is in.  HEART: RRR +S1 +S2  LUNGS: CTA B/L no rales or rhonchi.  ABDOMEN: Soft, non-tender.  EXTREMITIES: No edema cyanosis  SKIN: no rashes or skin breakdown, no nodules  NEURO: the patient doesn't respond to his name. He grimaces to noxious stimuli. Pupils are round and reactive to light, gag, cough, corneal reflexes are intact. He withdraws to noxious stimuli on all four extremities. He doesn't follow commands.     Current Facility-Administered Medications   Medication Dose Route Frequency    arformoterol tartrate (BROVANA) nebulizer solution 15 mcg  15 mcg Nebulization BID RT    budesonide (PULMICORT) nebulizer suspension 250 mcg  0.25 mg Nebulization BID RT    ipratropium (ATROVENT) 0.02 % nebulizer solution 0.5 mg  0.5 mg Nebulization TID RT    acetylcysteine (MUCOMYST) 10 % solution 400 mg  4 mL Inhalation BID RT    magnesium sulfate 4000 mg in 100 mL IVPB 
No accepting SNF acceptance from referral sent. BALTA received notification from floor nurse that patient's wife, Coretta Pa # [phone], requested a return call regarding the discharge planning. SW returned the call to patient's wife and provided the update. The wife advised with patient condition, they did not have the means to provide care for patient at home, and advised the family preference is for SNF placement for long-term care. The wife gave verbal consent to expand the search to area SNF. CMS to send referral. BALTA provided patient wife with [facility], as [facility] resource to assist family.      1:45 pm - Patient's wife and mother presented to patient's bedside. SW met family in room and explained level of care SNF and long-tern. BALTA research potential resource to provide family should patiently return home for care as an option. Pending SNF acceptance. Patient is also pending PEG procedure. SW to continue to follow.      ARACELI Galeano  Supervisee in Social Work  Care Management Department    
Noted increased restlessness, thrashing around bed with extremities and head.  Incontinent care provided, repositioned.  However, unable to settle patient.  Notified primary MD with assistance of charge nurse.  New order received for 1x does of IV Haldol 4 mg.  Order approved via pharmacy, administered by primary RN.  Sitter remain at bedside.  Will monitor closely.  
Nutrition Assessment     Type and Reason for Visit: Reassess    Nutrition Recommendations/Plan:   Resume TF order post PEG placement (new order as shown below)  Continue to monitor tolerance of EN, weight, labs (Glu), and plan of care during admission.           New Tube Feeding (TF) Orders:  Feeding Route: PEG tube  Formula: Glucerna 1.5  Schedule: Continous  Feeding Regimen: Initial @ 15 ml/hr, advance 10 ml q 6 hr, goal @ 65 ml/hr  Additives/Modulars:  None  Water Flushes: 190 mL q 4 hr (1140 mL total)  Goal TF & Flush Orders Provides: 2340 kcal, 129 gm pro, 1184 mL free water from tube feeding only, 2324 mL total water    Malnutrition Assessment:  Malnutrition Status: At risk for malnutrition (Comment) (r/t NPO status)    Nutrition Assessment:  +2.3% (~3kg) x 2 days. NPO with no nutrition support x 2days. Unsuccessful NGT re-insertion attempts per nursing notes. PEG tube placement pending.    Estimated Daily Nutrient Needs:  Energy (kcal):  2498 ([place] 1.1AF, 1SF) Weight Used for Energy Requirements:  (noted on 4/29)     Protein (g):   (1.2-1.5 g/kg) Weight Used for Protein Requirements: Ideal        Fluid (ml/day):  or per MD Method Used for Fluid Requirements: 1 ml/kcal    Nutrition Related Findings:   Pertinent meds: PPI, B9, Glargine, lispro, precedex (stopped). Pertinent labs: poc Glu (x 24hr) [phone] Wound Type: Multiple (traumatic toe (L foot), sheering on elbows)    Current Nutrition Therapies:    Diet: NPO      Anthropometric Measures:  Height: 190.5 cm (6' 3\")  Current Body Wt: 128.2 kg (282 lb 11.2 oz) (noted lot of blankets on bed)   BMI: 35.3    Nutrition Diagnosis:   Inadequate protein-energy intake related to  (hospitalization) as evidenced by other (comment) (NPO with no nutrition support x 2 days)    Nutrition Interventions:   Food and/or Nutrient Delivery: Start Tube Feeding  Nutrition Education/Counseling: Education not appropriate  Coordination of Nutrition Care: Continue to monitor 
Nutrition Follow-up Assessment       Nutrition Recommendations/Plan:   Modify current TF order (as shown below)  Continue to monitor tolerance of EN, weight, fluid status/edema, labs (Na+, Glu), and plan of care during admission.        New Tube Feeding (TF) Orders:  Feeding Route: NG tube  Formula: Glucerna 1.5  Schedule: Continuous  Feeding Regimen: goal @ 40 ml/hr  Additives/Modulars:  2 packs ProSource TF daily (each provides 60 kcal, 15 gm protein)   Water Flushes: 130 mL q 4 hr (780 mL total)  Goal TF & Flush Orders Provides: 1560 kcal, 109 gm pro, 729 mL free water from tube feeding only, 1509 mL total water, 100% RDIs  Goal TF & Flush w/ Propofol provides: 2063 kcal, 109 gm pro, and 1509 mL total water.      Patient Active Problem List   Diagnosis    HTN (hypertension)    Flexor tendon laceration of finger with open wound    MDD (major depressive disorder), recurrent severe, without psychosis (HCC)    Alcohol use disorder, moderate, dependence (HCC)    Alcohol withdrawal, uncomplicated (HCC)    Hypomagnesemia    Vitamin D deficiency    Asthma    Hypertriglyceridemia    Unresponsiveness    Acute respiratory failure with hypoxia and hypercarbia (HCC)    Cocaine abuse (HCC)    Hypoglycemia    Seizures (HCC)    Smoker    Anoxic encephalopathy (HCC)     Nutrition Assessment:    38 y.o. male is on hospital D#4 for Unresponsiveness and Hypoglycemia  Per visit pt is laying in bed, on vent, non-responsive, FiO2 40%.    Some wt gain noticed since admission. New edema noted. Per RN, one dose lasix given and pt responded to that well.  Current on Glu 1.5 @ 50ml/hr, tolerating.   Meds& Labs reviewed -  Noted trend down Na+. Also, on propofol @ 25 mcg/kg/min (provides 503 kcal, dosing wt 127 kg) -- possible weaning off per RN.   NFPE performed. See malnutrition assessment for details.     Plan of care discussed with intensivist and RN  - reduce current TF rate and adding protein modular to prevent overfeeding and ensure 
Nutrition Follow-up Assessment       Nutrition Recommendations/Plan:   Resume TF (order as shown below)  Monitor wt/fluid status for trend  Continue to monitor tolerance of TF, weight, labs (Na+), and plan of care during admission.        Tube Feeding (TF) Orders:  Feeding Route: Nasogastric  Formula: Diabetic  Schedule: Continuous  Feeding Regimen: Goal @40 ml/hr  Additives/Modulars: Protein (2 pk prosource)  Water Flushes: 140 mlQ4 (840 ml/d)  TF & Flush Orders Provides: 1560 kcal, 109 gm pro, 729 mL free water from tube feeding only, 1569 mL total water, 100% RDIs    Patient Active Problem List   Diagnosis    HTN (hypertension)    Flexor tendon laceration of finger with open wound    MDD (major depressive disorder), recurrent severe, without psychosis (HCC)    Alcohol use disorder, moderate, dependence (HCC)    Alcohol withdrawal, uncomplicated (HCC)    Hypomagnesemia    Vitamin D deficiency    Asthma    Hypertriglyceridemia    Unresponsiveness    Acute respiratory failure with hypoxia and hypercarbia (HCC)    Cocaine abuse (HCC)    Hypoglycemia    Seizures (HCC)    Smoker    Anoxic encephalopathy (HCC)    Delirium       Nutrition Assessment:    38 y.o. male is on hospital D#11 for unresponsiveness  Per visit pt is laying in bed, extubated and on sedated.   Some wt loss noted since last visit: -2.4% x 4 days -- suspect fluid related. Edema improving.  NPO x 4 days - Noted unsuccessful attempts for swallow eval d/t pt agitation.     Plan of care discussed with intensivist - Recommended resume TF per prolonged Npo.           4/28/2024     6:00 AM 4/24/2024     8:00 PM 4/23/2024     2:19 PM 4/22/2024     6:00 AM 4/21/2024     8:00 PM 4/19/2024     3:14 PM 4/19/2024     8:29 AM   Weight Metrics   Weight 276 lb 7.3 oz 281 lb 1.4 oz 287 lb 11.2 oz 287 lb 0.6 oz 282 lb 13.6 oz 279 lb 15.8 oz 280 lb   BMI (Calculated) 34.6 kg/m2 35.2 kg/m2 35.1 kg/m2 39 kg/m2 38.4 kg/m2 38 kg/m2 38.1 kg/m2       Nutrition Related 
Nutrition Follow-up Assessment       Nutrition Recommendations/Plan:   TF as ordered  Recommend advance to po diet when extubated- texture per SLP  Monitor wt/fluid status for trend  Continue to monitor tolerance of TF, weight, labs (BG), and plan of care during admission.         Current Tube Feeding (TF) Orders:  Feeding Route: Nasogastric  Formula: Diabetic  Schedule: Continuous  Feeding Regimen: Goal @40 ml/hr  Additives/Modulars: Protein (2 pk prosource)  Water Flushes: 130 mlQ4 (780ml/d)  Current TF & Flush Orders Provides: 1560 kcal, 109 gm pro, 729 mL free water from tube feeding only, 1509 mL total water, 100% RDIs    Patient Active Problem List   Diagnosis    HTN (hypertension)    Flexor tendon laceration of finger with open wound    MDD (major depressive disorder), recurrent severe, without psychosis (HCC)    Alcohol use disorder, moderate, dependence (HCC)    Alcohol withdrawal, uncomplicated (HCC)    Hypomagnesemia    Vitamin D deficiency    Asthma    Hypertriglyceridemia    Unresponsiveness    Acute respiratory failure with hypoxia and hypercarbia (HCC)    Cocaine abuse (HCC)    Hypoglycemia    Seizures (HCC)    Smoker    Anoxic encephalopathy (HCC)       Nutrition Assessment:    38 y.o. male is on hospital D#7 for Hypoglycemia  Per visit pt is laying in bed, intubated and sedated .   Wt stable.   Remain on TF w/ Glu 1.5 @ 40 ml/hr - tolerating well. Yet, noted elevated poc Glu and that pt propofol dose has been increased (from 25 to 45 mcg/kg/min, providing ~ 905 kcal/d)  NFPE found no change compared to last visit. See malnutrition assessment for details.     Plan of care discussed with intensivist - Per MD, may extubate pt today. RD recommended wean off propofol and possible modify current TF order per risk of overfeeding if TF continues >/= 2 days after extubation.         4/24/2024     8:00 PM 4/23/2024     2:19 PM 4/22/2024     6:00 AM 4/21/2024     8:00 PM 4/19/2024     3:14 PM 4/19/2024     
OCCUPATIONAL THERAPY EVALUATION/DISCHARGE    Patient: Louis Sarkar (38 y.o. male)  Date: 4/30/2024  Primary Diagnosis: Seizure (HCC) [R56.9]  Hypoglycemia [E16.2]  Polysubstance abuse (HCC) [F19.10]  Acute respiratory failure with hypoxia (HCC) [J96.01]  Unresponsiveness [R41.89]  Aspiration pneumonia of both lower lobes, unspecified aspiration pneumonia type (HCC) [J69.0]  Procedure(s) (LRB):  BRONCHOSCOPY; LAVAGE (N/A) 6 Days Post-Op   Precautions: Aspiration Risk, Fall Risk,  ,  ,  ,  ,  ,  ,    PLOF: Patient completed ADLs independently     ASSESSMENT AND RECOMMENDATIONS:  Patient presented supine in bed with brief and IV line. Patient with visitor present for entire session. Patient seen with physical therapy for second set of skilled hands. Patient completed assessment of ADLs and BUE strength, ROM (see details below). Patient with full PROM of BUEs. Patient with some spontaneous movement of BUEs (L more then R during this eval) Patient made no eye contact, patient  did not track people in room with eyes. Patient made no verbalizations, patient noted to have pooling of saliva in mouth and coughing. Patient repositioned with assist x2 for skin integrity protection. No Skilled OT: No OT goals identified, patient is currently unable to participate in OT services. Patient may benefit from PROM of BUEs for comfort  and monitoring of edema, recommend positioning of patient to protect skin and reduce edema when possible.     Maximum therapeutic gains met at current level of care and patient will be discharged from occupational therapy at this time.  Please place new OT orders if patient becomes responsive/able to participate in sessions.   Discharge Recommendation: Discharge Recommendations: Defer OT at this time    AMPA: AM-PAC Inpatient Daily Activity Raw Score: 6/24      This AMPA score should be considered in conjunction with interdisciplinary team recommendations to determine the most appropriate 
Occupational Therapy   Orders received and chart reviewed.   Spoke with RN who confirms pt does not follow commands; per chart review has spontaneous and non-purposeful movement only. Patient  not appropriate for OT evaluation/intervention at this time.  Will f/up for attempt of OT evaluation as medically appropriate. Thank you for this referral.   Jillian Strong OTR/L  
PHYSICAL THERAPY TREATMENT    Patient: Louis Sarkar (38 y.o. male)  Date: 5/1/2024  Diagnosis: Seizure (HCC) [R56.9]  Hypoglycemia [E16.2]  Polysubstance abuse (HCC) [F19.10]  Acute respiratory failure with hypoxia (HCC) [J96.01]  Unresponsiveness [R41.89]  Aspiration pneumonia of both lower lobes, unspecified aspiration pneumonia type (HCC) [J69.0] Unresponsiveness  Procedure(s) (LRB):  BRONCHOSCOPY; LAVAGE (N/A) 7 Days Post-Op  Precautions: Aspiration Risk, Fall Risk,  ,  ,  ,  ,  ,  ,      ASSESSMENT:  Pt found sleepoing with sitter present. Recently medicated for agitation. Nonverbal. Provided PROM x 5 reps for extremities. Decreased abduction observed in L hip. Rolling requires total assist, due to lack of participation.  Violently rotates head periodically. Not responding to questions or commands. LTCF is need in current condition. Will continue to follow for signs of improvement.     Progression toward goals: none  []      Improving appropriately and progressing toward goals  []      Improving slowly and progressing toward goals  [x]      Not making progress toward goals      PLAN:  Patient continues to benefit from skilled intervention to address the above impairments.  Continue treatment per established plan of care.    Further Equipment Recommendations for Discharge: hospital bed, mechanical lift, and wheelchair      AMPAC:   AM-PAC Inpatient Mobility without Stair Climbing Raw Score : 5    Current research shows that an AM-PAC score of 17 (13 without stairs) or less is not associated with a discharge to the patient's home setting. Based on an AM-PAC score and their current functional mobility deficits, it is recommended that the patient have 3-5 sessions per week of Physical Therapy at d/c to increase the patient's independence.       This AMPAC score should be considered in conjunction with interdisciplinary team recommendations to determine the most appropriate discharge setting. Patient's 
PHYSICAL THERAPY TREATMENT    Patient: Louis Sarkar (38 y.o. male)  Date: 5/3/2024  Diagnosis: Seizure (HCC) [R56.9]  Hypoglycemia [E16.2]  Polysubstance abuse (HCC) [F19.10]  Acute respiratory failure with hypoxia (HCC) [J96.01]  Unresponsiveness [R41.89]  Aspiration pneumonia of both lower lobes, unspecified aspiration pneumonia type (HCC) [J69.0] Unresponsiveness  Procedure(s) (LRB):  BRONCHOSCOPY; LAVAGE (N/A) 9 Days Post-Op  Precautions: Aspiration Risk, Fall Risk,  ,  ,  ,  ,  ,  ,      ASSESSMENT:  Session again use to provide PROM for all extremities. Pt continues to lack alertness.  No voluntary motion. RN requested assistance with bed mobility and linen swap. Pt spontaneously flails arm and brings knees to chest, no progress to note.      Progression toward goals: poor  []      Improving appropriately and progressing toward goals  []      Improving slowly and progressing toward goals  [x]      Not making progress toward goals      PLAN:  Patient continues to benefit from skilled intervention to address the above impairments.  Continue treatment per established plan of care.    Further Equipment Recommendations for Discharge: hospital bed, mechanical lift, and rolling walker    AMPAC:   AM-PAC Inpatient Mobility without Stair Climbing Raw Score : 5      Current research shows that an AM-PAC score of 17 (13 without stairs) or less is not associated with a discharge to the patient's home setting. Based on an AM-PAC score and their current functional mobility deficits, it is recommended that the patient have 3-5 sessions per week of Physical Therapy at d/c to increase the patient's independence.       This AMPAC score should be considered in conjunction with interdisciplinary team recommendations to determine the most appropriate discharge setting. Patient's social support, diagnosis, medical stability, and prior level of function should also be taken into consideration.     SUBJECTIVE:   Patient 
Palliative Medicine    After meeting with patient and his wife, Coretta, the goals of care have been defined.  Code status remains: FULL CODE. PEG placed for long-term nutrition. Plans being made for long time rehabilitation   AMD status: None on file. His wife, Coretta, is his closest living relative and surrogate decision maker Will sign off but remain available for reconsult as needed/if appropriate.     Thank you for the Palliative Medicine consult and allowing us to participate in the care of [place] Antonina Romano RN, MSN  Palliative Medicine   [phone]    
Palliative Medicine    CODE STATUS: FULL    AMD Status: none on file. His wife, Coretta, is his closest living relative     0930 Seen today in room 102 along with Crystal Hui NP.  Lying supine with head of bed elevated. Coretta, wife, at bedside. Eyes closed. Did not attempt to stimulate Awake, alert. Intubated/ventilated/sedated. Intubated 4/18/2024 FiO2 45% PEEP 5. Propofol gtt infusing    Lengthy conversation with Coretta about options moving forward if he cannot be extubated including trach/PEG vs compassionate extubation.    Record review show he was successfully extubated after we were in the room.    Disposition plan: to be determined. Will have to see how well he is able to be supported nutritionally.    Palliative care will continue to follow Louis Sarkar  and his family during his hospitalization and support them as they make healthcare decisions and define goals of care.      Lorena Romano RN, MSN  Palliative Medicine  P: [phone]    
Palliative Medicine    CODE STATUS: FULL CODE     AMD Status: none on file. His wife, Coretta, is his closes living relative.     1025 Seen today in room 102 along with [facility] of decision making.  Lying supine with head of bed elevated.  Intubated/ventilated/sedated. Intubated 4/18/2024 . Frequent cough with agitation. Propofol, versed, and fentanyl gtts infusing gtts infusing.    Sister at bedside.     Plans for bronchoscopy today with intensivist    Disposition plan: to be determined. Neurology and intensivist have initiated conversations ab out possibly proceeding with trach/PEG or consideration of compassionate extubation and initiation of comfort based care.     Palliative care will continue to follow Louis Sarkar  and his family during his hospitalization and support them as they make healthcare decisions and define goals of care.      Lorena Romano RN, MSN  Palliative Medicine  P: [phone]    
Palliative Medicine    CODE STATUS: FULL CODE    AMD Status: none on file. His wife, Coretta, is his closes living relative.     0910 Seen today in room 102 along with Crystal Hui NP.  Lying supine with head of bed elevated. Coretta, wife, at bedside.  Intubated/ventilated/sedated. Intubated 4/18/2024 FiO2 60% PEEP 6. Propofol and fentanyl gtts infusing.    Attempted to wean sedation yesterday but did not tolerated. Had increased coughing.     Coretta has good understanding of current status and continues to be hopeful for recovery.      Disposition plan: to be determined based on response to treatment and family decisions    Palliative care will continue to follow Louis Sarkar  and his family during his hospitalization and support them as they make healthcare decisions and define goals of care.      Lorena Romano RN, MSN  Palliative Medicine  P: [phone]    
Palliative Medicine    CODE STATUS: FULL CODE    AMD Status: none on file. His wife, Coretta, is his closest living relative.      0905 Seen today in room 102 along with Crystal Hui NP. Family including wife and sister were in the waiting room and then in the room with the patient.   Lying supine with head of bed elevated. Did not awaken to verbal stimulations. Skin clammy to touch. Intubated/ventilated/sedated. Intubated 4/18/2024 FiO2 35% PEEP 5. Propofol and fentanyl gtts infusing    Seen by neurology MRI pending. Had EEG with results pending.     Disposition plan: to be determined based on response to medical treatment and family choices.     Palliative care will continue to follow Louis Sarkar  and his family during his hospitalization and support them as they make healthcare decisions and define goals of care.      Lorena Romano RN, MSN  Palliative Medicine  P: [phone]    
Palliative Medicine   [facility] [phone]  [facility] [phone]    CODE STATUS:  FULL CODE / FULL AGGRESSIVE MEASURES    AMD:  Pt's spouse, Coretta Acosta, is pt's Legal next of kin/surrogate decision maker.     Palliative team, Crystal Hui NP and this [facility] met with pt at bedside for initial assessment. At time of assessment, pt intubated and sedated, being attended to by nursing staff.  Per nursing staff, pt's spouse in ICU waiting area.      Palliative team met with pt's spouse, Coretta Acosta, to obtain additional information. Per spouse, she came home this a.m. after working all night to find pt unresponsive, snoring loudy, with foam coming from his mouth. She reports she contacted EMS immediately. She reports he does use Coccaine and drinks alcohol.  She reports his insulin pen was directly beside him on the bed.      Pt and spouse have three children.  One of which, who ran away two months ago and has not been located yet.  She reports there have also been issues at work and stress related to finding a new place to live and moving.  Per spouse, pt has expressed suicidal ideation, in the past, and has voluntarily sought inpatient psych treatment.  She reports she wasn't aware whether or not he had gotten that bad.     NP explained pt's current medical condition, course of care anticipated over next couple of days, worries about pt's condition and possible complications.  Pt's spouse was made aware that there may need to be discussion regarding goals of care, if pt's condition doesn't improve.  Pt's wife verbalized understanding of this information and gratitude for thorough explanation of information.  Pt's wife was provided Palliative Team's contact information and encouraged to call with questions or concerns.   Pt's wife reports no further questions or concerns at this time.     Thank you for this referral to Palliative Care. The palliative care team remains 
Palliative Medicine   [facility] [phone]  [facility] [phone]    CODE STATUS: FULL     AMD Status: none on file. His wife, Coretta, is his closest living relative    Palliative team, Crystal Hui NP and this LMSW met with pt at bedside for follow up assessment. Upon entry, pt laying in bed with head elevated, eyes closed, receiving High Flow O2, moving legs and head in agitated manner, with bilateral soft wrist restraints for pt safety.  Pt's spouse, Coretta, at bedside     Palliative team provided update on pt's condition and supportive counseling. Pt continues to require sedation, due to agitation, but being extubated is a step in the right direction.  Pt's wife verbalized understanding and sense of encouragement. She stated lots of family has been coming in, but she is going to ask them to stop, to allow pt to heal with as little extra stimulation as possible.  She was supported in this decision.  Pt's wife reports no questions or concerns at this time.     Thank you for this referral to Palliative Care. The palliative care team remains available to provide support to patient and their family.     Shama Nolen, AB, APHSW-C  Palliative Medicine Inpatient   [facility]  [phone]  [facility]   Palliative COPE Line: [phone] ([phone])      
Palliative Medicine  Patient Name: Louis Sarkar  YOB: 1985  MRN: [id number]  Age: 38 y.o.  Gender: male    Date of Initial Consult: 4/18/2024  Date of Service: 4/23/2024  Time: 9:39 AM  Provider: VELIA Eckert NP  Hospital Day: 6  Admit Date: 4/18/2024  Referring Provider: Simin Malone MD        Reasons for Consultation:  Goals of Care    HISTORY OF PRESENT ILLNESS (HPI):   Louis Sarkar is a 38 y.o. male with a past medical history of type 2 DM, HTN, and ETOH use, and polysubstance use, who was admitted on 4/18/2024 from home with a diagnosis of unresponsiveness, hypoglycemia, acute respiratory failure, aspiration pneumonia, urine drug screen positive for cocaine, marijuana, and ethanol level 90 mg/dL.  Wife reports that patient was last seen well at midnight on 4/18 and was found this morning at 10:28 AM unresponsive, foaming at the mouth, with an empty insulin pen beside him.  Wife does report cocaine and alcohol use, and recent stress due to their 14-year-old child running away 2 months ago and has not yet been found.  Wife unsure if patient was considering self harm.  Per chart review, blood sugar was 31 when EMS checked it.  Patient is currently in the intensive care unit, orally intubated, nonresponsive.  CT of the head-Suggestion overall of loss of the gray-white junction and diffuse sulcal effacement which is concerning for cerebral edema, please correlate clinically. No acute hemorrhage is identified.  CT abdomen and pelvis shows 1. Extensive bilateral lung  infiltrates consistent with multifocal pneumonia versus aspiration pneumonitis. 2. The ET tube tip is at the josefa. It can be retracted 2-3 cm for improved positioning. 3. No acute findings in the abdomen or pelvis.    4/23/2024: Patient is lying in bed, orally intubated on mechanical ventilator, sedated. Patient's wife at bedside. Patient had coughing spells yesterday when sedation was weaned, no 
Palliative Medicine follow-up progress note  Patient Name: Louis Sarkar  YOB: 1985  MRN: [id number]  Age: 38 y.o.  Gender: male    Date of Initial Consult: 4/18/2024  Date of Service: 4/25/2024  Provider: VELIA Eckert NP  Hospital Day: 8  Admit Date: 4/18/2024  Referring Provider: Simin Malone MD        Reasons for Consultation:  Goals of Care    HISTORY OF PRESENT ILLNESS (HPI):   Louis Sarkar is a 38 y.o. male with a past medical history of type 2 DM, HTN, and ETOH use, and polysubstance use, who was admitted on 4/18/2024 from home with a diagnosis of unresponsiveness, hypoglycemia, acute respiratory failure, aspiration pneumonia, urine drug screen positive for cocaine, marijuana, and ethanol level 90 mg/dL.  Wife reports that patient was last seen well at midnight on 4/18 and was found this morning at 10:28 AM unresponsive, foaming at the mouth, with an empty insulin pen beside him.  Wife does report cocaine and alcohol use, and recent stress due to their 14-year-old child running away 2 months ago and has not yet been found.  Wife unsure if patient was considering self harm.  Per chart review, blood sugar was 31 when EMS checked it.  Patient is currently in the intensive care unit, orally intubated, nonresponsive.  CT of the head-Suggestion overall of loss of the gray-white junction and diffuse sulcal effacement which is concerning for cerebral edema, please correlate clinically. No acute hemorrhage is identified.  CT abdomen and pelvis shows 1. Extensive bilateral lung  infiltrates consistent with multifocal pneumonia versus aspiration pneumonitis. 2. The ET tube tip is at the josefa. It can be retracted 2-3 cm for improved positioning. 3. No acute findings in the abdomen or pelvis.    4/25/2024: Patient is lying in bed with eyes closed, remains intubated on SBT. After our visit he was liberated from ventilator, currently on 4 L NC.     4/24/24: Patient was 
Palliative Medicine follow-up progress note  Patient Name: Louis Sarkar  YOB: 1985  MRN: [id number]  Age: 38 y.o.  Gender: male    Date of Initial Consult: 4/18/2024  Date of Service: 4/26/2024  Provider: VELIA Eckert NP  Hospital Day: 9  Admit Date: 4/18/2024  Referring Provider: Simin Malone MD        Reasons for Consultation:  Goals of Care    HISTORY OF PRESENT ILLNESS (HPI):   Louis Sarkar is a 38 y.o. male with a past medical history of type 2 DM, HTN, and ETOH use, and polysubstance use, who was admitted on 4/18/2024 from home with a diagnosis of unresponsiveness, hypoglycemia, acute respiratory failure, aspiration pneumonia, urine drug screen positive for cocaine, marijuana, and ethanol level 90 mg/dL.  Wife reports that patient was last seen well at midnight on 4/18 and was found this morning at 10:28 AM unresponsive, foaming at the mouth, with an empty insulin pen beside him.  Wife does report cocaine and alcohol use, and recent stress due to their 14-year-old child running away 2 months ago and has not yet been found.  Wife unsure if patient was considering self harm.  Per chart review, blood sugar was 31 when EMS checked it.  Patient is currently in the intensive care unit, orally intubated, nonresponsive.  CT of the head-Suggestion overall of loss of the gray-white junction and diffuse sulcal effacement which is concerning for cerebral edema, please correlate clinically. No acute hemorrhage is identified.  CT abdomen and pelvis shows 1. Extensive bilateral lung  infiltrates consistent with multifocal pneumonia versus aspiration pneumonitis. 2. The ET tube tip is at the josefa. It can be retracted 2-3 cm for improved positioning. 3. No acute findings in the abdomen or pelvis.    4/26/2024: Patient is awake, moving in bed frequently, in bilateral wrist restraints and on precedex drip for agitation post extubation. Patient does not follow commands and he 
Palliative Medicine follow-up progress note  Patient Name: Louis Sarkar  YOB: 1985  MRN: [id number]  Age: 38 y.o.  Gender: male    Date of Initial Consult: 4/18/2024  Date of Service: 5/2/2024  Provider: VELIA Eckert NP  Hospital Day: 15  Admit Date: 4/18/2024  Referring Provider: Simin Maloen MD        Reasons for Consultation:  Goals of Care    HISTORY OF PRESENT ILLNESS (HPI):   Louis Sarkar is a 38 y.o. male with a past medical history of type 2 DM, HTN, and ETOH use, and polysubstance use, who was admitted on 4/18/2024 from home with a diagnosis of unresponsiveness, hypoglycemia, acute respiratory failure, aspiration pneumonia, urine drug screen positive for cocaine, marijuana, and ethanol level 90 mg/dL.  Wife reports that patient was last seen well at midnight on 4/18 and was found this morning at 10:28 AM unresponsive, foaming at the mouth, with an empty insulin pen beside him.  Wife does report cocaine and alcohol use, and recent stress due to their 14-year-old child running away 2 months ago and has not yet been found.  Wife unsure if patient was considering self harm.  Per chart review, blood sugar was 31 when EMS checked it.  Patient is currently in the intensive care unit, orally intubated, nonresponsive.  CT of the head-Suggestion overall of loss of the gray-white junction and diffuse sulcal effacement which is concerning for cerebral edema, please correlate clinically. No acute hemorrhage is identified.  CT abdomen and pelvis shows 1. Extensive bilateral lung  infiltrates consistent with multifocal pneumonia versus aspiration pneumonitis. 2. The ET tube tip is at the josefa. It can be retracted 2-3 cm for improved positioning. 3. No acute findings in the abdomen or pelvis.    5/2/2024: Patient moving head back-and-forth on his mother is brushing his hair, no purposeful interactions.     4/30/2024: Patient is awake, does move all extremities but no 
Palliative Medicine follow-up progress note  Patient Name: Louis Sarkar  YOB: 1985  MRN: [id number]  Age: 38 y.o.  Gender: male    Date of Initial Consult: 4/18/2024  Date of Service: 4/24/2024  Provider: Elvis Paris MD  Hospital Day: 7  Admit Date: 4/18/2024  Referring Provider: Simin Malone MD        Reasons for Consultation:  Goals of Care    HISTORY OF PRESENT ILLNESS (HPI):   Louis Sarkar is a 38 y.o. male with a past medical history of type 2 DM, HTN, and ETOH use, and polysubstance use, who was admitted on 4/18/2024 from home with a diagnosis of unresponsiveness, hypoglycemia, acute respiratory failure, aspiration pneumonia, urine drug screen positive for cocaine, marijuana, and ethanol level 90 mg/dL.  Wife reports that patient was last seen well at midnight on 4/18 and was found this morning at 10:28 AM unresponsive, foaming at the mouth, with an empty insulin pen beside him.  Wife does report cocaine and alcohol use, and recent stress due to their 14-year-old child running away 2 months ago and has not yet been found.  Wife unsure if patient was considering self harm.  Per chart review, blood sugar was 31 when EMS checked it.  Patient is currently in the intensive care unit, orally intubated, nonresponsive.  CT of the head-Suggestion overall of loss of the gray-white junction and diffuse sulcal effacement which is concerning for cerebral edema, please correlate clinically. No acute hemorrhage is identified.  CT abdomen and pelvis shows 1. Extensive bilateral lung  infiltrates consistent with multifocal pneumonia versus aspiration pneumonitis. 2. The ET tube tip is at the josefa. It can be retracted 2-3 cm for improved positioning. 3. No acute findings in the abdomen or pelvis.    4/24/24: Patient was seen in presence of palliative care RN and ICU staff member.  Patient had a coughing spell and became somewhat agitated afterwards.  He remains intubated 
Patient becoming very agitated: sitting up in bed, throwing legs off side of bed, pulling on restraints. Patient doesn't make eye contact with staff, track or focus with eyes, or focus on staff if spoken to. Patient continually required assistance with returning legs to bed or lying down by sitter and RN. Attempts to calm unsuccessful. Precedex gtt had to be increased to calm patient.   
Patient extremely agitated; aggressively coughing; copious amounts of thin clear secretions coming from oral cavity. Unable to redirect. Asad Paris paged; order for versed drip obtained. Prn versed given. Will also obtain KUB for OG placement.  
Patient has one accepting SNF-facility [facility]. SW called and spoke to intake Rep., Joanne, # [phone], who confirmed the acceptance for long-term care. Patient's family preference a locality closer to home or [facility]. SW awaiting response from [facility], [facility], and [facility] SNFs. SW to continue to follow.      Hector Smith, MSW  Supervisee in Social Work  Care Management Department    
Patient is pending PEG today. SW will continue to research SNFs' availability and acceptance. SW to continue to follow.         Hector Smith MSW  Supervisee in Social Work  Care Management Department    
Patient to discharge and transported by nonemergency to :    SNF [facility] Room -125B   [address] [place] [address]    Transportation arranged with [employer] # [phone] - spoke to Rep Knight and received trip# [id number], awaiting the transport time and name of transportation service provider.      SNF - Nurse Report# [phone]    Floor nurse update. SW to continue to follow.    Hector Smith MSW  Supervisee in Social Work  Care Management Department          
Per cm pt will go to LTC today with family agreement   
Per original order. Ancef to be given at 0000 (8 hours from prior dose at 1600.    2100 Patient has family at bedside. Eyes are closed and open spontaneously. Patient's LUE moves to stimuli. Other limbs do not at this time.     Patient is rested well ovenight, opening eyes spontaneously. He voided into brief. 25ml of d5 1/2 ns going at this time. Sitter remains at bedside.     Feeding pump brought into the room.    Mepilex changed on L arm R arm is dry and intact. Midline Ace wrapped.    Per Pharmacy, okay to piggy back Keppra with fluid.       
Physical Therapy    Discussed pt w/ Nurse and CNA.  Pt continues to be agitated and is not following any commands.  Observed pt spontaneously moving B LE around in bed, no eye opening but moves head spontaneously.  Will attempt PT eval once pt medically appropriate.    Nathalia Wang,PT  
Physical Therapy  PT attempted initial evaluation and RN request to hold eval until more medically stable and appropriate. Pt. Continues to not follow commands. Will attempt when more appropriate.    Chad Lewis. PT, DPT    
Placed Pt on 100% for bronch   
Placed on 3L NC 02 sats 100%  
Pt has good cuff leak  
Pt maintained ovn on CPAP/PS settings of 12/+5/45% FiO2 as per repeated vent asynchrony and frequent double-triggering.  Tolerating PS appropriate c VSS and WOB improving.  AM ABG WNL.  OET remains clean/dry/intact/secure and patent.  Continue to monitor and titrate ventilatory settings as clinically indicated.   
Pt remains orally intubated with size 8.0 mm et tube secured with tube webster @ 24 cm deandre at the teeth; breath sounds were decreased throughout & pt suctioned for scant amount secretions; all alarms on & functioning with ambu bag @ hob  
RN entered patient's chart to assist with care for primary RN.  
Received patient intubated and ventilated. ACVC rate18, , Peep 5 and Fio2 40% (weaned to 35%)  
Remote chart review    BAL growing moderate amount of MSSA GNR-- in progress, not final.    Restarted abx- Unasyn IV( last dose of Zosyn was 4/26), Serial procal. And monitor patient progress    Mariela Andrew MD  [facility])  Office: [phone] -Option #8  Office fax:  [phone]   
SW completing UAI. SW called patient's wife to provide an update regarding SNF and obtain additional information in order to complete UAI. There's an accepting facility in [place]. Wife stated her preference is to have a facility closer than home. BALTA will continue to follow.       Hector Smith, MSW  Supervisee in Social Work  Care Management Department    
SW continue to research with local facilities regarding placement, but no accepting facilities. SNF- [facility] declined. [facility] Intake Rep continues to research potential availability and advised their [place] location may have availability. SW to continue to follow.      Hector Smith, MSW  Supervisee in Social Work  Care Management Department    
SW followed up with patient's wife regarding the family preference for discharge planning. Patient wife conveyed her preference is a nursing facility in the local area. SW provided patient with availability and options and educated patient's wife about the level of care. At patient's wife request, SW will researched with [facility] about availability and follow up.    SW called  and spoke with [facility] Admission Rep. Sophia, who advised their facility does not have any availability. SW will continue to follow and update family after morning rounds.      ARACELI Galeano  Supervisee in Social Work  Care Management Department   
SW presented to patient's room to update whiteboard. Patient's mother accompanied him in the room. SW updated the mother and advised awaiting SNF placement and inquired made to facilities in the area. SW to continue to follow.     ARACELI Galeano  Supervisee in Social Work  Care Management Department  
SW received called from patient wife with the request for transport time. [facility] advised they received the pick time as 5pm.  Patient's wife update about the transport time. Wife advised she is at patient bedside.     ARACELI Galeano  Supervisee in Social Work  Care Management Department    
Shift report received from DALIA Greene RN Report included the following information SBAR, Kardex, ED Summary, Procedure Summary, Intake/Output, MAR, and Recent Results    0800- Shift assessment complete, pt resting comfortably in bed. Opening eyes to painful stimuli. No command following but non-purposeful movement in all extremities. See MAR and Flowsheets for more details. Wife at bedside, updated on pt condition and plan of care.     0936- Propofol stopped per Dr. Terrazas for SAT.     1014- Fentanyl stopped per Dr. Terrazas for SAT.     1211- Pt off the floor for MRI    1334- Back in room at this time from MRI.     1612- Pt noncompliant with ventilator, strong cough. Non-purposeful movement in all 4 extremities. Dr. Terrazas aware, okay to restart low dose sedation. Propofol restarted, see MAR and Flowsheets.     1654- Dr. Salmon at bedside talking to family.     Shift report given to LAI Myers RN Report included the following information SBAR, Kardex, ED Summary, Procedure Summary, Intake/Output, MAR, and Recent Results    
Shift report received from GERRY Quiroz RN Report included the following information SBAR, Kardex, ED Summary, Procedure Summary, Intake/Output, MAR, and Recent Results    0800- Shift assessment complete, pt resting in bed. Opening eyes and moving all 4 extremities spontaneously but no purposeful movement or command following at this time. Propofol infusing at 50 mcg/kg/hr. Pt remains intubated on SBT. See MAR and Flowsheets for more details.     0954- Propofol stopped for SAT.     1022- No command following, but pt more responsive at this time. New orders noted to extubate patient. Dr. James and respiratory therapist at bedside. Extubated to 4L NC at this time.     1200- Reassessment complete, pt opening eyes and moving all extremities spontaneously but no commands following at this time. Remains on 4L NC. See MAR and Flowsheets for more details.    1600- Reassessment complete, no change at this time. See MAR and Flowsheets for more details.     1815- Pt bathed, tolerated activity well. VSS.     Shift report given to GERRY Torres RN Report included the following information SBAR, Kardex, ED Summary, Procedure Summary, Intake/Output, MAR, and Recent Results        
Shift report received from GERRY Quiroz RN Report included the following information SBAR, Kardex, ED Summary, Procedure Summary, Intake/Output, MAR, and Recent Results'    0800- Shift assessment compete, pt resting comfortably in bed. Opening eyes to painful stimuli. No command following but non-purposeful movement in all extremities. See MAR and Flowsheets for more details. Wife at bedside, updated on pt condition and plan of care.     1007- Propofol stopped for SAT.     1138- Fentanyl stopped for SAT.    1401- Pt noncompliant with ventilator, strong cough. Non-purposeful movement in all 4 extremities. PRN Versed given.     1600- Reassessment complete, see MAR and Flowsheets for more details.     Shift report given to JOSE Quiroz RN Report included the following information SBAR, Kardex, ED Summary, Procedure Summary, Intake/Output, MAR, and Recent Results    
Shift report received from GERRY Torres RN Report included the following information SBAR, Kardex, ED Summary, Procedure Summary, Intake/Output, MAR, and Recent Results    0730- Shift assessment complete, pt resting comfortably in bed. Opening eyes and moving all 4 extremities spontaneously but non-purposefully. Currently on heated hiflow nasal cannula. See MAR and Flowsheets for more details.     0755- Wife at bedside, updated on pt condition and plan of care.     0842- Pt becoming more restless in bed, Precedex restarted at 0.2 mcg/kg/hr.     1200- Reassessment complete, see MAR and Flowsheets for more details.     1600- Reassessment complete, see MAR and Flowsheets for more details.   
Shift summary: Pt is non-verbal, constant myoclonic movements, with sitter at all times, frequently incontinent of urine and varinder care done. On IV antibiotic and IV Keppra. Plan for PEG tube placement - waiting on family's decision    0750 Bedside and Verbal shift change report given to Lida Gibson RN (oncoming nurse) by MICHAEL MONTAGUE RN   (offgoing nurse). Report included the following information Nurse Handoff Report, Adult Overview, Intake/Output, MAR, Quality Measures, and Neuro Assessment.      
Sitter at bedside throughout the night. Still no verbal stimuli and purposeful interactions.     0720    Bedside and Verbal shift change report given to CINDY Hilton (oncoming nurse) by CINDY Cates (offgoing nurse). Report included the following information Nurse Handoff Report, Adult Overview, Intake/Output, MAR, Recent Results, and Neuro Assessment.                
Speech Pathology:     Per RN: hold off on bedside swallow evaluation at this time secondary to intermittent agitation. Will follow up next date or as medical condition permits.     Thank you for this referral.    Silvia Morales M.S., CCC-SLP/L  Speech-Language Pathologist    
Speech Pathology:     Per nursing: hold off on swallow eval secondary to agitation. Will follow up next business date or as medical condition permits.     Thank you for this referral.    Silvia Morales M.S., CCC-SLP/L  Speech-Language Pathologist    
Speech Therapy:    Evaluation orders received. Upon completion of chart review and discussion with RN, pt not appropriate for SLP evaluation this date.  Currently, patient is:    [] Tolerating current po diet (per RN report)  [] Tolerating current po diet; however, poor po intake (per RN report)   [] Receiving nutrition via tube feeding   [x] Lethargic, solomnent, or difficult to arouse for safe po trials     [] Unable to manage secretions  [] Receiving intervention for respiratory distress  [] < 6 hours s/p extubation   [x] Other: Unable to follow direction, confused, nonverbal    Pt's mother at bedside when evaluation attempted. Informed her we could not do a swallow evaluation if he could not follow directions. Verbalized comprehension.    Thank you for this referral.    Mary Arce MA CCC-SLP  Speech-Language Pathologist     
Speech-Language Pathology  Order received and chart reviewed. Per RN, pt not appropriate for swalllow evaluation at this time d/t agitation. Will follow up as appropriate.    Thank you for this referral!    SHAWN Ji, SLP Graduate Clinician  
Speech-Language Pathology Evaluation Attempt  Attempted to see patient for clinical bedside swallow evaluation. Per nursing, patient unable to participate this date d/t decreased command following and intermittent agitation. They are attempting to insert NG tube. If successful, recommend strict aspiration precautions with aggressive oral care and positioning patient >45 degrees @HOB. ST will continue to follow up as appropriate.  Marimar An M.S., CCC-SLP    
TRANSFER - IN REPORT:    Verbal report received from Toshia WHITE, on Maybeury W Acosta Antonina  being received from ENDO for ordered procedure      Report consisted of patient's Situation, Background, Assessment and   Recommendations(SBAR).     Information from the following report(s) Nurse Handoff Report, Index, Surgery Report, and MAR was reviewed.  Assessment completed upon patient's arrival to unit. Restraints removed, and abdominal binder put in place to secure the PEG tube to avoid patient tugging and pulling on it.      Patient left with call light and their bed in the lowest position with brakes on. Care assumed.   
Talked with JUANA SEARS recommend  PEG per GI after she discussed the case with attending .   
This RN attempted to place an NG tube in the patient to start feeding but was unsuccessful. Pt did not tolerate procedure well, HR elevated into the 160s SBP in the 160s and pt continued to aggressively thrash in bed leading to aborting the insertion.   
UAI completed. SW followed up Admission for  [facility], and [facility] SNFs and there are no accepting facility within 50 mile radius beside [facility]. SW called patient's wife and provided and update. At the wife's request, SW left the UAI and information about regarding [facility] at patient's bedside for family to review. SW will continue to follow.     ARACELI Galeano  Supervisee in Social Work  Care Management Department    
Zosyn (Piperacillin/Tazobactam) Extended Infusion    Louis Sarkar, a 38 y.o. yo male, has been converted to an extended infusion of Zosyn while in the intensive care unit.    A loading dose of 3.375 or 4.5 gm will be given over 30 minutes depending on indication.    Extended infusions will begin 4 hours after the initial dose if CrCl  >/= 20 ml/min or 8 hours after the initial dose if CrCl < 20 ml/min.    Extended infusions will run over 4 hours (240 minutes).    Invalid input(s): \"CREA\"  Ht Readings from Last 1 Encounters:   04/19/24 1.829 m (6')     Wt Readings from Last 1 Encounters:   04/19/24 127 kg (280 lb)       CrCl : Serum creatinine: 1.07 mg/dL 04/19/24 0505  Estimated creatinine clearance: 131 mL/min    Renal adjustment of extended infusion of Zosyn  3.375 or 4.5 gm every 8 hours for CrCl >/= 20 ml/min  3.375 or 4.5 gm every 12 hours for CrCl < 20 ml/min, intermittent HD or PD       
Reviewer    PROVIDER RESPONSE TEXT:    This patient has asp pneumonia without Sepsis.    Query created by: Tracy Hammond on 4/19/2024 8:35 PM      Electronically signed by:  Simin Malone MD 4/22/2024 3:25 PM          
recommendations to determine the most appropriate discharge setting. Patient's social support, diagnosis, medical stability, and prior level of function should also be taken into consideration.     SUBJECTIVE:   Patient stated,     OBJECTIVE DATA SUMMARY:     Functional Mobility Training:  Bed Mobility:  Bed Mobility Training  Bed Mobility Training: Yes  Overall Level of Assistance: Total assistance  Rolling: Total assistance  Therapeutic Exercises:   PROM to Aleksandar UE and LE.  Pain:  Pain level pre-treatment: -/10  Pain level post-treatment: -/10   Pain Intervention(s): Rest, Ice, Repositioning   Response to intervention: Nurse notified    Activity Tolerance:    Poor  Please refer to the flowsheet for vital signs taken during this treatment.  After treatment:   [] Patient left in no apparent distress sitting up in chair  [x] Patient left in no apparent distress in bed  [x] Call bell left within reach  [] Nursing notified  [x] Caregiver present  [x] Bed alarm activated  [] SCDs applied      COMMUNICATION/EDUCATION:          Pablo Starks PTA  Minutes: 25    
04/25/24  0514   WBC 10.3 11.2   RBC 3.31* 3.32*   HGB 10.3* 10.4*   HCT 30.8* 30.8*    423*      Cardiac Enzymes Lab Results   Component Value Date    TROPHS 38 04/18/2024   ,No results found for: \"BNP\"   Coagulation No results for input(s): \"INR\", \"APTT\" in the last 72 hours.    Invalid input(s): \"PTP\"    Lipid Panel No results found for: \"CHOL\", \"CHOLPOCT\", \"CHOLX\", \"CHLST\", \"CHOLV\", \"325800\", \"HDL\", \"HDLC\", \"LDL\", \"LDLC\", \"236254\", \"VLDLC\", \"VLDL\", \"TGLX\", \"TRIGL\"   Pancreas No results for input(s): \"LIPASE\" in the last 72 hours.,No results for input(s): \"AMYLASE\" in the last 72 hours.   Liver Enzymes No results for input(s): \"TP\", \"ALB\" in the last 72 hours.    Invalid input(s): \"TBIL\", \"AP\", \"SGOT\", \"GPT\", \"DBIL\"   Thyroid Studies No results found for: \"T4\", \"T3RU\", \"TSH\"     Procedures/imaging: see electronic medical records for all procedures/Xrays and details which were not copied into this note but were reviewed prior to creation of Plan    TIME: E/M Time spent with patient and patient care issues: [] 31-40 mins  [] 41-49 mins  [x] 50 mins or more.     This time also includes physician non-face-to-face service time visit on the date of service such as  Preparing to see the patient (eg, review of tests)  Obtaining and/or reviewing separately obtained history  Performing a medically necessary appropriate examination and/or evaluation  Counseling and educating the patient/family/caregiver  Ordering medications, tests, or procedures  Referring and communicating with other health care professionals as needed  Documenting clinical information in the electronic or other health record  Independently interpreting results (not reported separately) and communicating results to the patient/family/caregiver  Care coordination and discharge planning with Case Management.      
None Identified  Food/Nutrient Intake Outcomes: Food and Nutrient Intake, Supplement Intake  Physical Signs/Symptoms Outcomes: Biochemical Data, Weight    Discharge Planning:     (po diet)     Hazel Chairez RD  Contact: [phone]      
\"BNPP\"   Liver Enzymes No results for input(s): \"TP\" in the last 72 hours.    Invalid input(s): \"ALB\", \"TBIL\", \"AP\", \"SGOT\", \"GPT\", \"DBIL\"   Thyroid Studies No results found for: \"T4\", \"T3RU\", \"TSH\"     Procedures/imaging: see electronic medical records for all procedures/Xrays and details which were not copied into this note but were reviewed prior to creation of Plan    XR CHEST PORTABLE    Result Date: 4/28/2024  EXAM: XR CHEST PORTABLE INDICATION: Atelectasis COMPARISON: 4/26/2024 FINDINGS: A portable AP radiograph of the chest was obtained at 517 hours. Cardiac monitoring leads.. Increased pulmonary edema.. Cardiomegaly..  The bones and soft tissues are grossly within normal limits.     Increased pulmonary edema..    XR CHEST PORTABLE    Result Date: 4/26/2024  EXAM:  XR CHEST PORTABLE INDICATION: et tube COMPARISON: 4/25/2024 TECHNIQUE: 0545 hours portable chest AP view FINDINGS: No change cardiomegaly. The ET tube and NG tube have been removed. Lung volumes have mildly improved. There is persistent mild edema.     1. Persistent mild pulmonary edema.    XR CHEST PORTABLE    Result Date: 4/25/2024  EXAM: XR CHEST PORTABLE INDICATION: et tube COMPARISON: 4/24/2024 FINDINGS: A portable AP radiograph of the chest was obtained at 525 hours. Tubes and lines are stable.. Mild pulmonary edema with interval improvement.. Cardiomegaly..  The bones and soft tissues are grossly within normal limits.     Mild pulmonary edema with interval improvement..    XR CHEST PORTABLE    Result Date: 4/24/2024  EXAM: XR CHEST PORTABLE INDICATION: et tube COMPARISON: 4/23/2024 FINDINGS: A portable AP radiograph of the chest was obtained at 528 hours. Tubes and lines are stable.. Increased pulmonary edema.. Low lung volumes and cardiomegaly. Probable small left effusion..  The bones and soft tissues are grossly within normal limits.     Increased pulmonary edema with small left effusion. Low lung volumes.    XR ABDOMEN (KUB) (SINGLE AP 
feeding-continue    Prophylaxis: DVT Prophylaxis: Enoxaparin. GI Prophylaxis: Pantoprazole.    Restraints: Reviewed-patient requires bilateral soft wrist restraints since he remains intubated, and risk of safety with pulling lines and tubes.     Lines/Tubes: PIV  ETT: 4/18/2024  OG tube 4/18/124  Stanley catheter 4/18/2024 (Medically necessary for strict input/output monitoring in critically ill patient, will remove it when not needed. Stanley bundle followed).    Advance Directive/Palliative Care: Per clinical course.    Quality Care: PPI, DVT prophylaxis, HOB elevated, Infection control all reviewed and addressed.    Care of plan d/w RN, RT.  Discussed with ICU RN and patient's wife at bedside; updated management plans from ICU perspective  (answered all questions to satisfaction).     High complexity decision making was performed during the evaluation of this patient at high risk for decompensation with multiple organ involvement.  Total critical care time spent rendering care exclusive of procedures/family discussion/coordination of care: 44 minutes.        Subjective/History of Present Illness:     Patient is a 38 y.o. male with PMHx significant for diabetes on large dose of insulin, hypertension, hyperlipidemia, depression, history of substance abuse including cocaine, previously admitted for overdose last admission [place] required Narcan drip.  Patient last time was seen at midnight, his wife went for out when she came back he was unresponsive on the floor, urgent EMS was called glucose was found to be 31.  He was given D10 but did not wake up, was given Narcan did not wake up.  He was intubated.  Transferred to ICU.  On arrival unresponsive with no sedation, hypertensive, CT of the head showing some brain edema, febrile, patient also has a history of alcohol use.  In the emergency room he had multiple times tonic-clonic seizures required Versed and Keppra.  Neurology on board.  Family at the bedside I 
(i.e.Sedation, Confusion)  Compensate with: visual, verbal, tactile, kinesthetic cues/model  Home Situation:  Social/Functional History  Lives With: Spouse  Active : No  Occupation: Full time employment  Critical Behavior:  Orientation Level: Unable to assess  Exceptions  Delayed responses to stimuli  Does not follow commands  Unable to maintain attention    Strength:    Strength: Generally decreased, functional (Appears decr'd but functional by observation of spontaneous  L UE elevation (briging hand to face) and L LE (hip/knee flexion > R LE; none R UE)    Tone & Sensation:   Tone: Normal  Sensation: Impaired (withdraws to noxious stimulus L LE)    Range Of Motion:  AROM: Grossly decreased, non-functional (with increased treatment time, frequent spontaneous and intermittent movement L UE, none R UE, occasional BLE's L>R; frequent head rotations L/R, additional nodding forward noted- unable to state intentional)  PROM: Within functional limits    Functional Mobility:  Bed Mobility:   Bed Mobility Training  Overall Level of Assistance: Total assistance  Interventions: Tactile cues;Visual cues;Manual cues;Verbal cues  Supine to Sit: Total assistance (Mechanical bed  utilized for long sit position)  Transfers:  Transfer Training  Transfer Training: No  Balance:   Balance  Sitting: Impaired  Sitting - Static: Poor (constant support);Prop sitting (Long sitting)  Sitting - Dynamic: Not tested  Therapeutic Exercises/Neuromuscular Re-education:   PROM; End range stretching shoulder flexion, scaption, ankle DF  Pain:  Pain level pre-treatment: NAD;  occasional furrowing of forehead noted   Pain level post-treatment:  NAD  Pain Intervention(s): Medication (see MAR); Rest, Ice, Repositioning  Response to intervention: Nurse notified, See doc flow    Activity Tolerance:   Activity Tolerance: Treatment limited secondary to decreased cognition;Treatment limited secondary to medical complications  Please refer to the 
5-40 mL  5-40 mL IntraVENous PRN Simin Malone MD        0.9 % sodium chloride infusion   IntraVENous PRN Simin Malone MD 5 mL/hr at 04/18/24 1633 New Bag at 04/18/24 1633    potassium chloride 20 mEq/50 mL IVPB (Central Line)  20 mEq IntraVENous PRN Simin Malone MD        Or    potassium chloride 10 mEq/100 mL IVPB (Peripheral Line)  10 mEq IntraVENous PRN Simin Malone MD   Stopped at 04/21/24 2115    magnesium sulfate 2000 mg in 50 mL IVPB premix  2,000 mg IntraVENous PRN Simin Malone MD   Stopped at 04/19/24 0932    enoxaparin Sodium (LOVENOX) injection 30 mg  30 mg SubCUTAneous Q12H Simin Malone MD   30 mg at 04/24/24 0154    ondansetron (ZOFRAN-ODT) disintegrating tablet 4 mg  4 mg Oral Q8H PRN Simin Malone MD        Or    ondansetron (ZOFRAN) injection 4 mg  4 mg IntraVENous Q6H PRN Simin Malone MD        polyethylene glycol (GLYCOLAX) packet 17 g  17 g Oral Daily PRN Simin Malone MD        acetaminophen (TYLENOL) tablet 650 mg  650 mg Oral Q6H PRN Simin Malone MD   650 mg at 04/21/24 2115    Or    acetaminophen (TYLENOL) suppository 650 mg  650 mg Rectal Q6H PRN Simin Malone MD        pantoprazole (PROTONIX) 40 mg in sodium chloride (PF) 0.9 % 10 mL injection  40 mg IntraVENous Daily Simin Malone MD   40 mg at 04/24/24 0932    levETIRAcetam (KEPPRA) 500 mg in sodium chloride 0.9 % 100 mL IVPB  500 mg IntraVENous Q12H Simin Malone MD   Stopped at 04/24/24 0229    glucose chewable tablet 16 g  4 tablet Oral PRN Simin Malone MD        dextrose bolus 10% 125 mL  125 mL IntraVENous PRN Simin Malone MD        Or    dextrose bolus 10% 250 mL  250 mL IntraVENous PRN Simin Malone MD        glucagon injection 1 mg  1 mg SubCUTAneous PRSimin Flynn MD        dextrose 10 % infusion   IntraVENous Continuous PRN Simin Malone MD        sodium phosphate 15 mmol in sodium chloride 0.9 % 250 mL IVPB  15 mmol IntraVENous PRN Simin Malone MD   Stopped at 04/20/24 2005    chlorhexidine (PERIDEX) 0.12 % solution 15 mL  15 mL Mouth/Throat BID Caitlyn Terrazas MD   15 mL at 04/23/24 2240        Review 
aggressive care.  Plan: Full code with full interventions    4/23/2024: Palliative medicine team including Lorena Romano RN and I met with patient at patient's bedside. Patient is lying in bed, orally intubated on mechanical ventilator, sedated. Patient's wife at bedside. Patient had coughing spells yesterday when sedation was weaned, no further seizures reported. Offered support to wife, asking great questions on what to do if patient doesn't recovery neurologically. She states she spoke to neurology today, and wants to give patient more time to improve. No changes in goals of care. Continue full code with full interventions.     4/19/2024: Palliative medicine team including Lorena Romano RN and I met with patient at patient's bedside. Patient is lying in bed, eyes closed, orally intubated on mechanical ventilator with propofol and fentanyl drips infusing. Patient does not respond to verbal or tactile stimuli. Wife Coretta and patient's sister in waiting room. Provided support. Wife reports that brain MRI is planned. EEG was done, awaiting report. No changes in goals of care today. Further goals of care discussions will depend on patient's response to aggressive medical management. Patient remains a full code with full interventions.      4/18/2024: Palliative medicine team including Shama Nolen LMSW and I met with patient at patient's bedside.  Patient is orally intubated, unresponsive, nursing at bedside providing care.  He was recently brought to the ICU from the emergency room.  Met with wife in ICU waiting room to introduce our role as palliative medicine team.  Wife Coretta Pa reports that they are legally , they share 3 children together.  Coretta reports last seeing patient well at midnight on 4/18/2024, when she came back home around 10:30 AM this morning 4/18 patient was lying in bed unresponsive, with foam coming out of his mouth, with an empty insulin pen in his bed.  Wife is not 
exclusive of procedures/family discussion/coordination of care: 45 minutes.        Subjective/History of Present Illness:     Patient is a 38 y.o. male with PMHx significant for diabetes on large dose of insulin, hypertension, hyperlipidemia, depression, history of substance abuse including cocaine, previously admitted for overdose last admission [place] required Narcan drip.  Patient last time was seen at midnight, his wife went for out when she came back he was unresponsive on the floor, urgent EMS was called glucose was found to be 31.  He was given D10 but did not wake up, was given Narcan did not wake up.  He was intubated.  Transferred to ICU.  On arrival unresponsive with no sedation, hypertensive, CT of the head showing some brain edema, febrile, patient also has a history of alcohol use.  In the emergency room he had multiple times tonic-clonic seizures required Versed and Keppra.  Neurology on board.  Family at the bedside I discussed with wife critical condition        4/25/2024 :   Patient seen in ICU  Patient remains on ventilator support  Patient with frequent cough symptoms  No vomiting or diarrhea  No bloody aspirates from ET tube  Stable hemodynamics  Urine output-good-2.1 L yesterday    Sedation-propofol infusion      Review of Systems:  ROS not obtained due to patient factor.       No Known Allergies   Past Medical History:   Diagnosis Date    Alcohol use disorder, severe, dependence ([facility]) 3/29/2018    Alcohol withdrawal, uncomplicated ([facility]) 3/29/2018    Asthma     vs Reactive Airway Disease with normal PFT 7/7/14    Diabetes ([facility]) 2012    HTN (hypertension)     Hyperlipidemia LDL goal < 70 12/2014    Leg pain, bilateral 2012    ? peripheral neuropathy    MDD (major depressive disorder), recurrent severe, without psychosis ([facility]) 3/29/2018    Vitamin D deficiency 12/2014      No past surgical history on file.   Social History     Tobacco Use    Smoking status: Former    Smokeless tobacco: Never 
kinesthetic cues/model  Home Situation:  Social/Functional History  Lives With: Spouse  Active : No  Occupation: Full time employment  Critical Behavior:  Orientation Level: Unable to assess    Strength:    Strength: Grossly decreased, non-functional    Tone & Sensation:   Tone: Normal  Sensation: Impaired (no response to noxiouse stim R LE; withdraws to same L LE)    Range Of Motion:  AROM: Grossly decreased, non-functional (no volitional movement, only spontaneous BLE's on rare occasion today)  PROM: Within functional limits  Functional Mobility:  Bed Mobility:   Bed Mobility Training  Overall Level of Assistance: Total assistance;Assist X2  Rolling: Total assistance;Assist X2  Transfers:   Transfer Training  Transfer Training: No  Ambulation/Gait Training:  Gait  Gait Training: No  Therapeutic Exercises/Neuromuscular Re-education:   PROM BLE's  Pain:  Pain level pre-treatment: NAD  Pain level post-treatment: NAD  Pain Intervention(s): Medication (see MAR); Rest, Ice, Repositioning  Response to intervention: Nurse notified, See doc flow    Activity Tolerance:   Activity Tolerance: Patient tolerated evaluation without incident;Treatment limited secondary to decreased cognition;Treatment limited secondary to medical complications;Patient limited by fatigue  Please refer to the flowsheet for vital signs taken during this treatment.    After treatment:   []         Patient left in no apparent distress sitting up in chair  [x]         Patient left in no apparent distress in bed  []         Call bell left within reach  [x]         Nursing notified  [x]         Visitor present  [x]         Bed alarm activated  []         SCDs applied    COMMUNICATION/EDUCATION:   Patient Education  Education Given To: Patient  Education Provided: Role of Therapy;Plan of Care  Education Method: Verbal  Barriers to Learning: Cognition  Education Outcome: Unable to verbalize    Thank you for this referral.  Onesimo Berry PT  Minutes: 
concerns: Unable to assess  [] Yes /  [] No   If \"Yes\" to discuss with pastoral care during IDT     Does caregiver feel burdened by caring for their loved one:   [] Yes /  [x] No /  [] No Caregiver Present/Available [] No Caregiver [] Pt Lives at Facility  If \"Yes\" to discuss with social work during IDT    Anticipatory grief assessment: Unable to assess  [] Normal  / [] Maladaptive     If \"Maladaptive\" to discuss with social work during IDT         LAB AND IMAGING FINDINGS:   Objective data reviewed:  labs, images, records, medication use, vitals, and chart     FINAL COMMENTS   Thank you for allowing Palliative Medicine to participate in the care of Louis Sarkar.    Only check if applicable and billing time based rather than MDM  [x] The total encounter time on this service date was 35 minutes which was spent performing a face-to-face encounter and personally completing the provider-level activities documented in the note. This includes time spent prior to the visit and after the visit in direct care of the patient. This time does not include time spent in any separately reportable services.    Electronically signed by   VELIA Eckert NP  Palliative Care Team  on 4/19/2024 at 10:29 AM    
is in satisfactory position. NG tube courses into the stomach. The distal tip is not seen. There is cardiomegaly. Lungs demonstrate bibasilar atelectasis. Left lower lobe atelectasis is unchanged. There is increasing atelectasis in the right lower lobe and right middle lobe.. There are airspace opacities in the perihilar regions suggesting pneumonia.     1. Bilateral perihilar airspace opacities likely reflect pneumonia. 2. Persistent opacity in the left lower lobe likely atelectasis pneumonia is not excluded. 3. Increasing opacities in the right lower lobe and right middle lobe is and/or pneumonia.    XR CHEST PORTABLE    Result Date: 4/21/2024  EXAM:  XR CHEST PORTABLE INDICATION: Evaluate endotracheal tube COMPARISON: 4/20/2024 TECHNIQUE: portable chest AP view at 0833 hours FINDINGS: The cardiac silhouette is within normal limits. The pulmonary vasculature is within normal limits. The endotracheal tube terminates at the thoracic inlet. The NG tube extends below the hemidiaphragm. Bilateral interstitial and alveolar airspace disease remains patchy with increased density in the left upper lobe and left base.     Multilobar lung opacities with increased density in the left upper lobe and left base, compatible with interval radiographic blossoming or progression of disease. Satisfactory endotracheal tube position    XR CHEST PORTABLE    Result Date: 4/20/2024  EXAM: XR CHEST PORTABLE INDICATION: daily. Interstitial opacities. COMPARISON: 4/19/2024 FINDINGS: A portable AP radiograph of the chest was obtained at 916 hours. Cardiac monitoring leads. Tubes and lines are stable.. Bilateral interstitial and airspace opacities unchanged.. The cardiac and mediastinal contours and pulmonary vascularity are normal.  The bones and soft tissues are grossly within normal limits.     Bilateral interstitial and airspace opacities unchanged..    MRI BRAIN WO CONTRAST    Result Date: 4/19/2024  EXAM: MRI BRAIN WO CONTRAST 
Mild annular calcification at the anterior and posterior leaflets of the mitral valve.   Aorta: Normal sized ascending aorta. Mildly dilated aortic root. Ao root diameter is 4.0 cm.   Image quality is fair.     XR CHEST PORTABLE    Result Date: 4/19/2024  EXAM: XR CHEST PORTABLE INDICATION: et tube COMPARISON: 4/18/2024 FINDINGS: A portable AP radiograph of the chest was obtained at 535 hours. Tubes and lines are stable.. Bilateral interstitial and airspace opacities with some improvement.. Cardiomegaly..  The bones and soft tissues are grossly within normal limits.     Bilateral interstitial and airspace opacities with some improvement..    XR CHEST 1 VIEW    Result Date: 4/18/2024  XR CHEST 1 VIEW, 4/18/2024 5:30 PM INDICATION:  et tube adjusted. COMPARISON: CT chest April 18, 2024 TECHNIQUE: Semiupright AP portable chest radiograph FINDINGS: The ET tube is in satisfactory position. The NG tube tip is in the stomach. The heart size is normal. Dense bilateral lung consolidations persist without interval change. No definite effusion or pneumothorax is seen.     The ET tube is in satisfactory position. No interval change in dense bilateral lung consolidations..     Vascular duplex lower extremity venous bilateral    Result Date: 4/18/2024    No evidence of deep vein thrombosis in the right lower extremity.   No evidence of deep vein thrombosis in the left lower extremity.     CT CHEST ABDOMEN PELVIS W CONTRAST Additional Contrast? None    Result Date: 4/18/2024  EXAM: CT of the chest, abdomen, and pelvis with contrast April 18, 2024 INDICATION: Unresponsive. 38-year-old man, found down, unresponsive. COMPARISON: None TECHNIQUE:  Following the uneventful intravenous administration of 100 cc Isovue-300, 5 mm axial images were obtained through the chest, abdomen, and pelvis. Oral contrast was not administered.  Coronal and sagittal reconstructions were generated. CT dose reduction was achieved through use of a 
bilateral lung infiltrates are again seen involving bilateral upper lobes, mildly involving the right middle lobe, and extensively involving bilateral lower lobes. LIVER: No mass or biliary dilatation. GALLBLADDER: Unremarkable. SPLEEN: No mass. PANCREAS: No mass or ductal dilatation. ADRENALS: Unremarkable. KIDNEYS: No mass, calculus, or hydronephrosis. STOMACH: Unremarkable. SMALL BOWEL: No dilatation or wall thickening. COLON: No dilatation or wall thickening. APPENDIX: Unremarkable. PERITONEUM: No ascites or pneumoperitoneum. RETROPERITONEUM: No lymphadenopathy or aortic aneurysm. REPRODUCTIVE ORGANS: Normal URINARY BLADDER: Bladder is decompressed by Stanley catheter. BONES: No destructive bone lesion. ADDITIONAL COMMENTS: N/A     1. Extensive bilateral lung infiltrates consistent with multifocal pneumonia versus aspiration pneumonitis. 2. The ET tube tip is at the josefa. It can be retracted 2-3 cm for improved positioning. 3. No acute findings in the abdomen or pelvis.    XR CHEST PORTABLE    Result Date: 4/25/2024  EXAM: XR CHEST PORTABLE INDICATION: et tube COMPARISON: 4/24/2024 FINDINGS: A portable AP radiograph of the chest was obtained at 525 hours. Tubes and lines are stable.. Mild pulmonary edema with interval improvement.. Cardiomegaly..  The bones and soft tissues are grossly within normal limits.     Mild pulmonary edema with interval improvement..    XR CHEST PORTABLE    Result Date: 4/24/2024  EXAM: XR CHEST PORTABLE INDICATION: et tube COMPARISON: 4/23/2024 FINDINGS: A portable AP radiograph of the chest was obtained at 528 hours. Tubes and lines are stable.. Increased pulmonary edema.. Low lung volumes and cardiomegaly. Probable small left effusion..  The bones and soft tissues are grossly within normal limits.     Increased pulmonary edema with small left effusion. Low lung volumes.    XR ABDOMEN (KUB) (SINGLE AP VIEW)    Result Date: 4/23/2024  INDICATION:  ogt placement EXAMINATION:  ABDOMEN 
(6')   Wt 127 kg (280 lb)   SpO2 97%   BMI 37.97 kg/m²     Weight:  Wt Readings from Last 3 Encounters:   04/19/24 127 kg (280 lb)          Ventilator Settings:  Lab Results   Component Value Date/Time    MODE ASSIST CONTROL 04/18/2024 04:02 PM       VENT SETTINGS (Comprehensive)  Vent Information  Ventilator Day(s): 2  Ventilator ID: [id number]  Ventilator Safety Check Performed Pre-Use: Yes  Ventilator Initiate: Yes  Vent Mode: AC/VC  Additional Respiratory Assessments  Pulse: 97  Respirations: 23  SpO2: 97 %  Humidification Source: [facility]      Physical Exam:     General/Neurology: Intubated unresponsive.  Pupils 2 mm sluggish reaction.  Sedated currently with propofol.  Gag positive, occasional spontaneous movements of upper and lower extremities off sedation did not follow commands   Head:   Normocephalic, without obvious abnormality, atraumatic.  Eye:   EOM intact. No scleral icterus, no pallor, no cyanosis.  Nose:   No sinus tenderness  Throat:  Lips, mucosa, and tongue normal. No oral thrush.  Neck:   Supple, symmetric. No lymphadenopathy. Trachea midline.  Lung:   Moderate air entry bilateral equal. Aleksandar rales at the bsaes . No rhonchi. No wheezing. No stridors. No prolongded expiration. No accessory muscle use.  Heart:   Regular rate & rhythm. S1 S2 present. No murmur.  No JVD.  Abdomen:  Soft. NT. ND. +BS. No masses.  Extremities:  No pedal edema. No cyanosis. No clubbing.  Pulses: 2+ and symmetric in DP. Capillary refill: normal.   Lymphatic:  No cervical or supraclavicular palpable lymphadenopathy.  Musculoskeletal: No joint swelling. No tenderness.   Skin:   Color, texture, turgor normal. No rashes or lesions.     Data:       Recent Results (from the past 24 hour(s))   CBC with Auto Differential    Collection Time: 04/18/24 11:16 AM   Result Value Ref Range    WBC 9.3 4.6 - 13.2 K/uL    RBC 4.79 4.35 - 5.65 M/uL    Hemoglobin 14.6 13.0 - 16.0 g/dL    Hematocrit 43.8 36.0 - 48.0 %    MCV 91.4 78.0 - 100.0 
April 18, 2024 INDICATION: Unresponsive. 38-year-old man, found down, unresponsive. COMPARISON: None TECHNIQUE:  Following the uneventful intravenous administration of 100 cc Isovue-300, 5 mm axial images were obtained through the chest, abdomen, and pelvis. Oral contrast was not administered.  Coronal and sagittal reconstructions were generated. CT dose reduction was achieved through use of a standardized protocol tailored for this examination and automatic exposure control for dose modulation. FINDINGS: The ET tube is just above the josefa. There is an NG tube in place with the tip in the gastric fundus. There is trace bilateral gynecomastia. THYROID: No nodule. MEDIASTINUM: No mass or lymphadenopathy. LU: No mass or lymphadenopathy. THORACIC AORTA: No dissection or aneurysm. MAIN PULMONARY ARTERY: Normal in caliber. TRACHEA/BRONCHI: Patent. ESOPHAGUS: No wall thickening or dilatation. HEART: Normal in size.  Coronary artery calcium:  absent. PLEURA: No effusion or pneumothorax. LUNGS: Dense bilateral lung infiltrates are again seen involving bilateral upper lobes, mildly involving the right middle lobe, and extensively involving bilateral lower lobes. LIVER: No mass or biliary dilatation. GALLBLADDER: Unremarkable. SPLEEN: No mass. PANCREAS: No mass or ductal dilatation. ADRENALS: Unremarkable. KIDNEYS: No mass, calculus, or hydronephrosis. STOMACH: Unremarkable. SMALL BOWEL: No dilatation or wall thickening. COLON: No dilatation or wall thickening. APPENDIX: Unremarkable. PERITONEUM: No ascites or pneumoperitoneum. RETROPERITONEUM: No lymphadenopathy or aortic aneurysm. REPRODUCTIVE ORGANS: Normal URINARY BLADDER: Bladder is decompressed by Stanley catheter. BONES: No destructive bone lesion. ADDITIONAL COMMENTS: N/A     1. Extensive bilateral lung infiltrates consistent with multifocal pneumonia versus aspiration pneumonitis. 2. The ET tube tip is at the josefa. It can be retracted 2-3 cm for improved 
Result      CT CHEST ABDOMEN PELVIS W CONTRAST Additional Contrast? None   Final Result      1. Extensive bilateral lung infiltrates consistent with multifocal pneumonia   versus aspiration pneumonitis.      2. The ET tube tip is at the josefa. It can be retracted 2-3 cm for improved   positioning.      3. No acute findings in the abdomen or pelvis.      CT HEAD WO CONTRAST   Final Result   Suggestion overall of loss of the gray-white junction and diffuse sulcal   effacement which is concerning for cerebral edema, please correlate clinically.   No acute hemorrhage is identified.            XR CHEST PORTABLE   Final Result      Diffuse bilateral pulmonary infiltrates. ET tube and NG tube satisfactory   position.             LAST EKG  Results for orders placed or performed during the hospital encounter of 04/18/24   EKG 12 Lead   Result Value Ref Range    Ventricular Rate 118 BPM    Atrial Rate 118 BPM    P-R Interval 160 ms    QRS Duration 88 ms    Q-T Interval 342 ms    QTc Calculation (Bazett) 479 ms    P Axis 51 degrees    R Axis 232 degrees    T Axis 45 degrees    Diagnosis       Sinus tachycardia  Possible Left atrial enlargement  Right superior axis deviation  Right ventricular hypertrophy  Septal infarct , age undetermined  Inferior infarct , age undetermined  Abnormal ECG  Confirmed by Emmy ARMIJO, Carloz ([id number]) on 4/19/2024 1:39:36 PM         Yamila Brush MD  
tissues are grossly within normal limits.     Bilateral interstitial and airspace opacities unchanged..     Mariela Andrew MD  [facility])  Office #:     [phone] Option #[phone]   Office Fax: [phone]           
There is suggestion overall of loss of the gray-white junction and diffuse sulcal effacement.. 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 There is no intracranial hemorrhage, extra-axial collection, or mass effect. The basilar cisterns are open. No CT evidence of acute infarct. The bone windows demonstrate no abnormalities. The visualized portions of the paranasal sinuses and mastoid air cells are clear.     Suggestion overall of loss of the gray-white junction and diffuse sulcal effacement which is concerning for cerebral edema, please correlate clinically. No acute hemorrhage is identified.     XR CHEST PORTABLE    Result Date: 4/18/2024  EXAM:  XR CHEST PORTABLE INDICATION: Intubation COMPARISON: none TECHNIQUE: portable chest AP view FINDINGS: The cardiac silhouette is within normal limits. ET tube has been placed and projects in satisfactory position. NG tube has been placed with the sidehole projecting over the fundus, the tip is not visualized. Diffuse bilateral pulmonary infiltrates, most substantial in the right lower lobe. The visualized bones and upper abdomen are age-appropriate.     Diffuse bilateral pulmonary infiltrates. ET tube and NG tube satisfactory position.       ASSESSMENT/IMPRESSION:   Possible hypoxic, probable hypoglycemic brain injury. EEG shows abnormal findings but no clear-cut pattern of anoxic brain injury. Brain MRI shows abnormal findings indicative of hypoxic brain injury. His prognosis for cognitively favorable outcome is guarded. Apparently, the patient will need peg tube and tracheostomy, possibly acute care stay after the hospitalization. I told his wife that he may stay like this and she may need to think about the outcomes. Palliative care is involved.     PLAN/RECOMMENDATIONS:  Continue palliative care discussions..  Continue serial neuro examinations, Sedation vacations  Continue full supportive care, treatment of underlying toxic/metabolic etiologies  Continue 
deviation  Right ventricular hypertrophy  Septal infarct , age undetermined  Inferior infarct , age undetermined  Abnormal ECG  Confirmed by Carloz Booth MD ([id number]) on 4/19/2024 1:39:36 PM         Yamila Brush MD  
extremities, limited exam   Extrm: no leg edema or swelling or clubbing  Skin: no rash  Lymphatic: no cervical or supraclavicular adenopathy        Data:       Recent Results (from the past 24 hour(s))   POCT Glucose    Collection Time: 04/25/24 11:29 AM   Result Value Ref Range    POC Glucose 283 (H) 70 - 110 mg/dL   POCT Glucose    Collection Time: 04/25/24  5:36 PM   Result Value Ref Range    POC Glucose 261 (H) 70 - 110 mg/dL   POCT Glucose    Collection Time: 04/25/24 11:28 PM   Result Value Ref Range    POC Glucose 246 (H) 70 - 110 mg/dL   Magnesium    Collection Time: 04/26/24  5:03 AM   Result Value Ref Range    Magnesium 1.8 1.6 - 2.6 mg/dL   Phosphorus    Collection Time: 04/26/24  5:03 AM   Result Value Ref Range    Phosphorus 3.4 2.5 - 4.9 MG/DL   Calcium, Ionized    Collection Time: 04/26/24  5:03 AM   Result Value Ref Range    IONIZED CALCIUM 1.24 1.12 - 1.32 MMOL/L   Basic Metabolic Panel    Collection Time: 04/26/24  5:03 AM   Result Value Ref Range    Sodium 141 136 - 145 mmol/L    Potassium 3.6 3.5 - 5.5 mmol/L    Chloride 105 100 - 111 mmol/L    CO2 30 21 - 32 mmol/L    Anion Gap 6 3.0 - 18 mmol/L    Glucose 270 (H) 74 - 99 mg/dL    BUN 16 7.0 - 18 MG/DL    Creatinine 0.72 0.6 - 1.3 MG/DL    Bun/Cre Ratio 22 (H) 12 - 20      Est, Glom Filt Rate >90 >60 ml/min/1.73m2    Calcium 10.1 8.5 - 10.1 MG/DL   POCT Glucose    Collection Time: 04/26/24  5:24 AM   Result Value Ref Range    POC Glucose 262 (H) 70 - 110 mg/dL           ABG Recent Labs     04/24/24  0533 04/25/24  0508   MODE Volume Control CPAP/PS   POCTV 520  --    POCFIO2 50 40            CMP Recent Labs     04/24/24  0605 04/24/24  1654 04/25/24  0514 04/26/24  0503     --  139 141   K 3.5 3.7 3.7 3.6     --  103 105   CO2 27  --  31 30   BUN 13  --  15 16   ANIONGAP 8  --  5 6   CREATININE 0.68  --  0.72 0.72   GLUCOSE 239*  --  218* 270*   CALCIUM 9.4  --  9.2 10.1   PROT 6.9  --   --   --    GLOB 4.7*  --   --   --    BILITOT 
infarct is noted. There is no intracranial hemorrhage, extra-axial collection, or mass effect. The basilar cisterns are open. No CT evidence of acute infarct. The bone windows demonstrate no abnormalities. The visualized portions of the paranasal sinuses and mastoid air cells are clear.     Suggestion overall of loss of the gray-white junction and diffuse sulcal effacement which is concerning for cerebral edema, please correlate clinically. No acute hemorrhage is identified.     XR CHEST PORTABLE    Result Date: 4/18/2024  EXAM:  XR CHEST PORTABLE INDICATION: Intubation COMPARISON: none TECHNIQUE: portable chest AP view FINDINGS: The cardiac silhouette is within normal limits. ET tube has been placed and projects in satisfactory position. NG tube has been placed with the sidehole projecting over the fundus, the tip is not visualized. Diffuse bilateral pulmonary infiltrates, most substantial in the right lower lobe. The visualized bones and upper abdomen are age-appropriate.     Diffuse bilateral pulmonary infiltrates. ET tube and NG tube satisfactory position.       ASSESSMENT/IMPRESSION:   Possible hypoxic, probable hypoglycemic brain injury. EEG shows abnormal findings but no clear-cut pattern of anoxic brain injury. Prognosis is guarded but uncertain. Therefore, brain MRI would be necessary to have a better idea about prognostication.    PLAN/RECOMMENDATIONS:  Brain MRI without contrast  Continue serial neuro examinations  Continue supportive care, treatment of underlying toxic/metabolic etiologies    I will follow the patient. Please do not hesitate to return with any questions.    Signed:  Jaspreet Quiñonez MD  4/19/2024  11:59 AM  
No evidence of deep vein thrombosis in the left lower extremity.     CT CHEST ABDOMEN PELVIS W CONTRAST Additional Contrast? None  Result Date: 4/18/2024  EXAM: CT of the chest, abdomen, and pelvis with contrast April 18, 2024 INDICATION: Unresponsive. 38-year-old man, found down, unresponsive. COMPARISON: None TECHNIQUE:  Following the uneventful intravenous administration of 100 cc Isovue-300, 5 mm axial images were obtained through the chest, abdomen, and pelvis. Oral contrast was not administered.  Coronal and sagittal reconstructions were generated. CT dose reduction was achieved through use of a standardized protocol tailored for this examination and automatic exposure control for dose modulation. FINDINGS: The ET tube is just above the josefa. There is an NG tube in place with the tip in the gastric fundus. There is trace bilateral gynecomastia. THYROID: No nodule. MEDIASTINUM: No mass or lymphadenopathy. LU: No mass or lymphadenopathy. THORACIC AORTA: No dissection or aneurysm. MAIN PULMONARY ARTERY: Normal in caliber. TRACHEA/BRONCHI: Patent. ESOPHAGUS: No wall thickening or dilatation. HEART: Normal in size.  Coronary artery calcium:  absent. PLEURA: No effusion or pneumothorax. LUNGS: Dense bilateral lung infiltrates are again seen involving bilateral upper lobes, mildly involving the right middle lobe, and extensively involving bilateral lower lobes. LIVER: No mass or biliary dilatation. GALLBLADDER: Unremarkable. SPLEEN: No mass. PANCREAS: No mass or ductal dilatation. ADRENALS: Unremarkable. KIDNEYS: No mass, calculus, or hydronephrosis. STOMACH: Unremarkable. SMALL BOWEL: No dilatation or wall thickening. COLON: No dilatation or wall thickening. APPENDIX: Unremarkable. PERITONEUM: No ascites or pneumoperitoneum. RETROPERITONEUM: No lymphadenopathy or aortic aneurysm. REPRODUCTIVE ORGANS: Normal URINARY BLADDER: Bladder is decompressed by Stanley catheter. BONES: No destructive bone lesion. ADDITIONAL 
examination and automatic exposure control for dose modulation.  FINDINGS: There is suggestion overall of loss of the gray-white junction and diffuse sulcal effacement.. 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 There is no intracranial hemorrhage, extra-axial collection, or mass effect. The basilar cisterns are open. No CT evidence of acute infarct. The bone windows demonstrate no abnormalities. The visualized portions of the paranasal sinuses and mastoid air cells are clear.   Suggestion overall of loss of the gray-white junction and diffuse sulcal effacement which is concerning for cerebral edema, please correlate clinically. No acute hemorrhage is identified.         Please note: Voice-recognition software may have been used to generate this report, which may have resulted in some phonetic-based errors in grammar and contents. Even though attempts were made to correct all the mistakes, some may have been missed, and remained in the body of the document.    Angel James MD  4/23/2024  
pneumonia versus aspiration pneumonitis. 2. The ET tube tip is at the josefa. It can be retracted 2-3 cm for improved positioning. 3. No acute findings in the abdomen or pelvis.    CT HEAD WO CONTRAST  Result Date: 4/18/2024  EXAM: CT HEAD WO CONTRAST INDICATION: Altered mental status COMPARISON: None. CONTRAST: None. TECHNIQUE: Unenhanced CT of the head was performed using 5 mm images. Brain and bone windows were generated. Coronal and sagittal reformats. CT dose reduction was achieved through use of a standardized protocol tailored for this examination and automatic exposure control for dose modulation.  FINDINGS: There is suggestion overall of loss of the gray-white junction and diffuse sulcal effacement.. 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 There is no intracranial hemorrhage, extra-axial collection, or mass effect. The basilar cisterns are open. No CT evidence of acute infarct. The bone windows demonstrate no abnormalities. The visualized portions of the paranasal sinuses and mastoid air cells are clear.   Suggestion overall of loss of the gray-white junction and diffuse sulcal effacement which is concerning for cerebral edema, please correlate clinically. No acute hemorrhage is identified.         Please note: Voice-recognition software may have been used to generate this report, which may have resulted in some phonetic-based errors in grammar and contents. Even though attempts were made to correct all the mistakes, some may have been missed, and remained in the body of the document.    Asad Paris MD  4/30/2024

## 2024-05-09 NOTE — DISCHARGE SUMMARY
Unremarkable. PERITONEUM: No ascites or pneumoperitoneum. RETROPERITONEUM: No lymphadenopathy or aortic aneurysm. REPRODUCTIVE ORGANS: Normal URINARY BLADDER: Bladder is decompressed by Stanley catheter. BONES: No destructive bone lesion. ADDITIONAL COMMENTS: N/A     1. Extensive bilateral lung infiltrates consistent with multifocal pneumonia versus aspiration pneumonitis. 2. The ET tube tip is at the josefa. It can be retracted 2-3 cm for improved positioning. 3. No acute findings in the abdomen or pelvis.    CT HEAD WO CONTRAST    Result Date: 4/18/2024  EXAM: CT HEAD WO CONTRAST INDICATION: Altered mental status COMPARISON: None. CONTRAST: None. TECHNIQUE: Unenhanced CT of the head was performed using 5 mm images. Brain and bone windows were generated. Coronal and sagittal reformats. CT dose reduction was achieved through use of a standardized protocol tailored for this examination and automatic exposure control for dose modulation.  FINDINGS: There is suggestion overall of loss of the gray-white junction and diffuse sulcal effacement.. 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 There is no intracranial hemorrhage, extra-axial collection, or mass effect. The basilar cisterns are open. No CT evidence of acute infarct. The bone windows demonstrate no abnormalities. The visualized portions of the paranasal sinuses and mastoid air cells are clear.     Suggestion overall of loss of the gray-white junction and diffuse sulcal effacement which is concerning for cerebral edema, please correlate clinically. No acute hemorrhage is identified.     XR CHEST PORTABLE    Result Date: 4/18/2024  EXAM:  XR CHEST PORTABLE INDICATION: Intubation COMPARISON: none TECHNIQUE: portable chest AP view FINDINGS: The cardiac silhouette is within normal limits. ET tube has been placed and projects in satisfactory position. NG tube has been placed with the sidehole projecting over the fundus, the tip is not visualized. Diffuse bilateral

## 2024-05-09 NOTE — WOUND CARE
Wound Care Note:    Chart audited for low hoang score of 11, patient with high risk for skin breakdown, pressure injury?no.     Skin Care & Pressure Relief Recommendations  Minimize layers of linen  Pads under patient to optimize support surface  Turn/reposition approximately every 2 hours  Use pillow wedges to maintain positioning but do not put pillow directly over wounds  Manage incontinence   Promote continence; Skin Protective lotion/cream to buttocks and sacrum daily and as needed with incontinence care  Offload heels pillows    Consult wound care if any wounds noted during admission    
    Wound Care Note:    Chart audited for low hoang score of 12, patient with high risk for skin breakdown, pressure injury?no.     Skin Care & Pressure Relief Recommendations  Minimize layers of linen  Pads under patient to optimize support surface  Turn/reposition approximately every 2 hours  Use pillow wedges to maintain positioning but do not put pillow directly over wounds  Manage incontinence   Promote continence; Skin Protective lotion/cream to buttocks and sacrum daily and as needed with incontinence care  Offload heels pillows    Consult wound care if any wounds noted during admission    
IP WOUND CONSULT    Louis Sarkar  MEDICAL RECORD NUMBER:  [id number]  AGE: 38 y.o.   GENDER: male  : 1985  TODAY'S DATE:  2024    GENERAL     [x] Follow-up   [] New Consult    [x] Present on Admission  [] Hospital Acquired    Louis Sarkar is a 38 y.o. male referred by:   [x] Physician  [] Nursing  [] Other:         PAST MEDICAL HISTORY    Past Medical History:   Diagnosis Date    Alcohol use disorder, severe, dependence ([facility]) 3/29/2018    Alcohol withdrawal, uncomplicated ([facility]) 3/29/2018    Asthma     vs Reactive Airway Disease with normal PFT 14    Diabetes ([facility])     HTN (hypertension)     Hyperlipidemia LDL goal < 70 2014    Leg pain, bilateral     ? peripheral neuropathy    MDD (major depressive disorder), recurrent severe, without psychosis ([facility]) 3/29/2018    Vitamin D deficiency 2014        PAST SURGICAL HISTORY    Past Surgical History:   Procedure Laterality Date    BRONCHOSCOPY N/A 2024    BRONCHOSCOPY; LAVAGE performed by Angel James MD at [facility] ENDOSCOPY       FAMILY HISTORY    Family History   Problem Relation Age of Onset    Diabetes Other         cousin, Type I dm    Diabetes Maternal Aunt     Diabetes Paternal Aunt     Stroke Paternal Uncle     Diabetes Mother     Hypertension Mother        SOCIAL HISTORY    Social History     Tobacco Use    Smoking status: Former    Smokeless tobacco: Never   Substance Use Topics    Alcohol use: Not Currently    Drug use: No       ALLERGIES    No Known Allergies    MEDICATIONS    No current facility-administered medications on file prior to encounter.     Current Outpatient Medications on File Prior to Encounter   Medication Sig Dispense Refill    albuterol sulfate HFA (PROVENTIL;VENTOLIN;PROAIR) 108 (90 Base) MCG/ACT inhaler Inhale 2 puffs into the lungs every 4 hours as needed      amitriptyline (ELAVIL) 25 MG tablet Take 1 tablet by mouth nightly      ARIPiprazole (ABILIFY) 5 MG tablet Take 
learning  [] Refused teaching         [] N/A       Electronically signed by Becky Amanda RN on 5/6/2024 at 1:26 PM

## 2024-09-12 ENCOUNTER — TRANSCRIBE ORDERS (OUTPATIENT)
Facility: HOSPITAL | Age: 39
End: 2024-09-12

## 2024-09-12 DIAGNOSIS — R13.10 DYSPHAGIA, UNSPECIFIED TYPE: Primary | ICD-10-CM

## 2024-09-26 ENCOUNTER — HOSPITAL ENCOUNTER (OUTPATIENT)
Facility: HOSPITAL | Age: 39
Discharge: HOME | End: 2024-09-29
Payer: COMMERCIAL

## 2024-09-26 DIAGNOSIS — R13.10 DYSPHAGIA, UNSPECIFIED TYPE: ICD-10-CM

## 2024-09-26 PROCEDURE — 92611 MOTION FLUOROSCOPY/SWALLOW: CPT

## 2024-09-26 PROCEDURE — 2500000003 HC RX 250 WO HCPCS: Performed by: RADIOLOGY

## 2024-09-26 PROCEDURE — 74230 X-RAY XM SWLNG FUNCJ C+: CPT

## 2024-09-26 RX ADMIN — BARIUM SULFATE 45 ML: 400 SUSPENSION ORAL at 13:22

## 2024-09-26 RX ADMIN — BARIUM SULFATE 30 ML: 400 SUSPENSION ORAL at 13:21

## 2024-09-26 NOTE — PROGRESS NOTES
[facility]  SPEECH LANGUAGE PATHOLOGY OUTPATIENT MODIFIED BARIUM SWALLOW STUDY    Patient: Louis Sarkar (39 y.o. male)  Date: 9/26/2024  Primary Diagnosis: Dysphagia, unspecified type [R13.10]   Day of Surgery   Precautions: Aspiration    Assessment:  Based on the objective data described below, the patient presents with mod-sev oropharyngeal dysphagia. Pt tolerated puree and honey-thick liquids via spoon and cup/sip without aspiration/penetration events. Significantly delayed a-p transit of puree observed with oral stasis post swallow. Pt with premature spillage across all PO trials to valleculae (worsened with nectar-thick liquid trials which resulted in a silent aspiration event). Deficits include decreased orolingual strength/ROM, weak base of tongue retraction, decreased laryngeal elevation/excursion, and impaired pharyngeal motility/sensation. Recommend to continue PEG as primary means of nutrition/hydration with supplement honey-thick liquid feeds orally for swallow stim/comfort, aspiration precautions, oral care TID, and meds via PEG. Results/recommendations d/w pt and pt's wife following study with video feedback utilization. Also d/w pt's SLP via phone post study. All questions answered to the best of this clinician's ability.      Recommendations:   PEG as primary means of nutrition/hydration  Honey-thick liquids supplemental for swallow stim and comfort  Aspiration precautions  HOB >45 during po intake, remain >30 for 30-45 minutes after po   Small bites/sips; alternate liquid/solid with slow feeding rate   Oral care TID  Meds via PEG  Continuation of SLP services per primary SLP discretion     SUBJECTIVE:   Patient nonverbal.     OBJECTIVE:     Past Medical History:   Diagnosis Date    Alcohol use disorder, severe, dependence ([facility]) 3/29/2018    Alcohol withdrawal, uncomplicated ([facility]) 3/29/2018    Asthma     vs Reactive Airway Disease with normal PFT 7/7/14

## 2024-11-14 ENCOUNTER — HOSPITAL ENCOUNTER (OUTPATIENT)
Facility: HOSPITAL | Age: 39
Discharge: HOME | End: 2024-11-17
Payer: COMMERCIAL

## 2024-11-14 DIAGNOSIS — R13.12 DYSPHAGIA, OROPHARYNGEAL: ICD-10-CM

## 2024-11-14 PROCEDURE — 2500000003 HC RX 250 WO HCPCS: Performed by: NURSE PRACTITIONER

## 2024-11-14 PROCEDURE — 74230 X-RAY XM SWLNG FUNCJ C+: CPT

## 2024-11-14 PROCEDURE — 92611 MOTION FLUOROSCOPY/SWALLOW: CPT

## 2024-11-14 RX ADMIN — BARIUM SULFATE 45 ML: 400 SUSPENSION ORAL at 13:26

## 2024-11-14 RX ADMIN — BARIUM SULFATE 15 ML: 400 PASTE ORAL at 13:26

## 2024-11-14 NOTE — PROGRESS NOTES
SPEECH PATHOLOGY MODIFIED BARIUM SWALLOW STUDY & TREATMENT    Patient: Louis Sarkar (39 y.o. male)  Date: 11/14/2024  Primary Diagnosis: Dysphagia, oropharyngeal [R13.12]   Day of Surgery   Precautions: Aspiration    ASSESSMENT :  Based on the objective data described below, the patient presents with moderate oropharyngeal dysphagia. Pt agitated throughout study with abrupt movements with whole body requiring max tactile and verbal cues from wife and SLP to complete PO trials. Pt ultimately tolerated reg solids, honey thick, and nectar thick liquids via tsp without penetration/aspiration events. Significantly delayed a-p transit/swallow initiation of >10 secs with all consistencies presented. Impaired labial seal seen with attempts of straw trials despite max cues. Pt with premature spillage across all PO trials to pyriforms, however positive oropharyngeal clearance. Deficits include decreased orolingual strength/ROM, weak base of tongue retraction, and severely delayed swallow initiation that puts Pt at a high risk of aspiration with thin liquids. Recommend to continue PEG as primary means of nutrition/hydration with supplement of reg solids and nectar thick liquids orally for swallow stim/comfort, aspiration precautions, oral care TID, and meds via PEG. Results/recommendations d/w pt's wife following study with video feedback utilization. Also d/w pt's SLP via phone post study. All questions answered to the best of this clinician's ability.     TREATMENT :  Treatment provided post diagnostic testing including oropharyngeal anatomy/physiology, MBS results, diet recommendations and compensatory strategies/positioning.  Pt able to verbalize understanding.  Will continue to follow.       Patient will benefit from skilled intervention to address the above impairments.  Patient's rehabilitation potential guarded .    Factors which may influence rehabilitation potential include:   []              None  with reg solids and nectar thick liquids for swallow stim/comfort     Orientation:  Unable to assess    Type of Study: Modified barium swallow  Patient Position: Upright in fluoro chiar    Trial 1:   Consistencies Administered: Regular;Moderately Thick - teaspoon;Mildly Thick- teaspoon   How Presented: SLP-fed/Presented;Spoon   Bolus Acceptance: Impaired   Bolus Formation/Control: Impaired     Propulsion: Delayed (# of seconds)   Oral Residue: Anterior   Timing: Prolonged pooling 11-29 sec;Pyriform sinus;Pooling 6-10 sec   Penetration: None   Aspiration/Timing: No evidence of aspiration   Pharyngeal Clearance: Vallecular residue   Cues: Moderate     Swallowing Physiology  Penetration-Aspiration Scale (PAS): 1 - Material does not enter the airway  Pharyngeal-Esophageal Segment: No impairment  Pharyngeal Dysfunction: None  Findings  Oral Phase Severity: Moderate  Pharyngeal Phase Severity: Moderate    8-point Penetration-Aspiration Scale: Score 1    PAIN:  Intensity Pre-treatment: 0/10   Intensity Post-treatment: 0/10      COMMUNICATION/EDUCATION:   [x]  Post diagnostic testing education including oropharyngeal anatomy/physiology, MBS results, diet recommendations and compensatory strategies/positioning provided via demonstration, verbalization and teach back of comprehension   []  Video feedback utilized   []  Handout regarding diet recommendations and thickener instructions provided.  []  Patient/family have participated as able in goal setting and plan of care  []  Patient/family agree to work toward stated goals and plan of care  []  Patient understands intent and goals of therapy but is neutral about his/her participation  []  Patient unable to participate in goal setting/plan of care secondary to cognition, hearing/vision deficits; education ongoing with interdisciplinary staff    Thank you for this referral.    Nina Fischer M.S. CCC-SLP  Speech Language Pathologist

## (undated) DEVICE — SLEEVE COMPR STD 12 IN FOR 165IN CALF COMFORT VENODYNE SYS

## (undated) DEVICE — NEEDLE ASPIR TRNSBRONCH 1.8 MM 21 GAX15 MM 130 CM EXCELON

## (undated) DEVICE — BASIN EMSIS 16OZ GRAPHITE PLAS KID SHP MOLD GRAD FOR ORAL

## (undated) DEVICE — GARMENT,MEDLINE,DVT,INT,CALF,MED, GEN2: Brand: MEDLINE

## (undated) DEVICE — ADAPTER TBNG DIA15MM SWVL FBROPT BRONCHSCP TERM 2 AXIS PEEP

## (undated) DEVICE — SYSTEM SPUT COLL 50ML CONIC CNTRFUG TB CONT W/ DURABLE 3

## (undated) DEVICE — 1860 HEALTH CARE N95 MASK, 20EACH/BOX  6 BX/C: Brand: 3M™

## (undated) DEVICE — FORCEPS BX L100CM DIA1.8MM WRK CHN 2MM PULM S STL RAD JAW 4

## (undated) DEVICE — BE 105-8 BRONCHOSCOPE SWIVEL - 15MM ID/22MM OD (PATIENT PORT) X15MM OD (EQUIPMENT PORT). REUSABLE.  FITS COMPONENTS OF ADULT VENTILATOR CIRCUITS.  MOLDED OF POLYETHERIMIDE. INCLUDES TWO SILICONE RUBBER CAPS; ONE CAP ALLOWS FOR THE USE OF A SUCTIONING CATHETER WHILE THE OTHER CAP ALLOWS FOR THE USE OF A FIBER-OPTIC BRONCHOSCOPE WITHOUT SIGNIFICANT LOSS OF PEEP.: Brand: BE 105-8 BRONCHOSCOPE SWIVEL

## (undated) DEVICE — MASK SURG REG ORNG LEV 3 SFTY SEAL 4 LAYR SFT INNR LINING

## (undated) DEVICE — SET ADMIN L104IN 20 GTT GRAV RLER CLMP SMRT SITE NDL FREE

## (undated) DEVICE — SINGLE USE BIOPSY VALVE MAJ-210: Brand: SINGLE USE BIOPSY VALVE (STERILE)

## (undated) DEVICE — SYRINGE MED 3ML NDL 22GA L1 1/2IN REG BVL SFGLDE

## (undated) DEVICE — GLOVE ORANGE PI 7 1/2   MSG9075

## (undated) DEVICE — TUBING, SUCTION, 1/4" X 12', STRAIGHT: Brand: MEDLINE

## (undated) DEVICE — NEEDLE ASPIR INNR 21GA OUTER 19GA L3X15MM TRNSBRONCH

## (undated) DEVICE — BITE BLOCK ENDOSCP UNIV AD 6 TO 9.4 MM

## (undated) DEVICE — LINER SUCT CANSTR 3000CC PLAS SFT PRE ASSEMB W/OUT TBNG W/

## (undated) DEVICE — FORCEP BX 3 1.8 MMX100 CM 2 MM NDL FOR PULM BLU RADIAL JAW

## (undated) DEVICE — BLADE, TONGUE, 6", STERILE: Brand: MEDLINE

## (undated) DEVICE — APPLICATOR COTTON TIP STRL 5/PK

## (undated) DEVICE — TOWEL,OR,DSP,ST,BLUE,STD,4/PK,20PK/CS: Brand: MEDLINE

## (undated) DEVICE — NEBULIZER,KIT,T-MOUTHPIECE,6"RESER,7'TUB: Brand: MEDLINE

## (undated) DEVICE — 1860S HEALTH CARE RESPIRATOR N95 120EA/C: Brand: 3M™

## (undated) DEVICE — SYRINGE 20ML LL S/C 50

## (undated) DEVICE — BRUSH CYTO L140CM DIA1.5MM WRK CHN 2MM BRIST SHTH RNG HNDL

## (undated) DEVICE — GOWN PLASTIC FILM THMBHKS UNIV BLUE: Brand: CARDINAL HEALTH

## (undated) DEVICE — Device

## (undated) DEVICE — SYRINGE MED 10ML SLIP TIP BLNT FILL AND LUERLOCK DISP

## (undated) DEVICE — CATHETER SUCT TR FL TIP 14FR W/ O CTRL

## (undated) DEVICE — YANKAUER,FLEXIBLE HANDLE,REGLR CAPACITY: Brand: MEDLINE INDUSTRIES, INC.

## (undated) DEVICE — SINGLE USE SUCTION VALVE MAJ-209: Brand: SINGLE USE SUCTION VALVE (STERILE)

## (undated) DEVICE — CONTAINER FORMALIN PREFILLED 10% NBF 40ML